# Patient Record
Sex: MALE | Race: WHITE | NOT HISPANIC OR LATINO | Employment: OTHER | URBAN - METROPOLITAN AREA
[De-identification: names, ages, dates, MRNs, and addresses within clinical notes are randomized per-mention and may not be internally consistent; named-entity substitution may affect disease eponyms.]

---

## 2017-01-01 ENCOUNTER — DOCUMENTATION (OUTPATIENT)
Dept: RADIATION ONCOLOGY | Facility: HOSPITAL | Age: 82
End: 2017-01-01

## 2017-01-01 ENCOUNTER — OFFICE VISIT (OUTPATIENT)
Dept: INTERNAL MEDICINE | Facility: CLINIC | Age: 82
End: 2017-01-01

## 2017-01-01 ENCOUNTER — ANESTHESIA (OUTPATIENT)
Dept: PERIOP | Facility: HOSPITAL | Age: 82
End: 2017-01-01

## 2017-01-01 ENCOUNTER — PATIENT OUTREACH (OUTPATIENT)
Dept: CASE MANAGEMENT | Facility: OTHER | Age: 82
End: 2017-01-01

## 2017-01-01 ENCOUNTER — HOSPITAL ENCOUNTER (EMERGENCY)
Facility: HOSPITAL | Age: 82
Discharge: HOME | End: 2017-08-13
Attending: EMERGENCY MEDICINE | Admitting: EMERGENCY MEDICINE

## 2017-01-01 ENCOUNTER — TELEPHONE (OUTPATIENT)
Dept: GASTROENTEROLOGY | Facility: CLINIC | Age: 82
End: 2017-01-01

## 2017-01-01 ENCOUNTER — HOSPITAL ENCOUNTER (EMERGENCY)
Facility: HOSPITAL | Age: 82
Discharge: HOME | End: 2017-08-24
Attending: EMERGENCY MEDICINE | Admitting: EMERGENCY MEDICINE

## 2017-01-01 ENCOUNTER — HOSPITAL ENCOUNTER (EMERGENCY)
Facility: HOSPITAL | Age: 82
Discharge: HOME | End: 2017-10-21
Attending: EMERGENCY MEDICINE | Admitting: EMERGENCY MEDICINE

## 2017-01-01 ENCOUNTER — APPOINTMENT (OUTPATIENT)
Dept: CT IMAGING | Facility: HOSPITAL | Age: 82
End: 2017-01-01

## 2017-01-01 ENCOUNTER — APPOINTMENT (OUTPATIENT)
Dept: CT IMAGING | Facility: HOSPITAL | Age: 82
End: 2017-01-01
Attending: UROLOGY

## 2017-01-01 ENCOUNTER — OFFICE VISIT (OUTPATIENT)
Dept: CARDIOLOGY | Facility: CLINIC | Age: 82
End: 2017-01-01

## 2017-01-01 ENCOUNTER — TELEPHONE (OUTPATIENT)
Dept: SOCIAL WORK | Facility: HOSPITAL | Age: 82
End: 2017-01-01

## 2017-01-01 ENCOUNTER — APPOINTMENT (OUTPATIENT)
Dept: RADIATION ONCOLOGY | Facility: HOSPITAL | Age: 82
End: 2017-01-01

## 2017-01-01 ENCOUNTER — HOSPITAL ENCOUNTER (INPATIENT)
Facility: HOSPITAL | Age: 82
LOS: 5 days | Discharge: HOME HEALTH | End: 2017-04-13
Attending: EMERGENCY MEDICINE | Admitting: HOSPITALIST

## 2017-01-01 ENCOUNTER — TELEPHONE (OUTPATIENT)
Dept: INTERNAL MEDICINE | Facility: CLINIC | Age: 82
End: 2017-01-01

## 2017-01-01 ENCOUNTER — ANESTHESIA EVENT (OUTPATIENT)
Dept: PERIOP | Facility: HOSPITAL | Age: 82
End: 2017-01-01

## 2017-01-01 ENCOUNTER — OFFICE VISIT (OUTPATIENT)
Dept: GASTROENTEROLOGY | Facility: CLINIC | Age: 82
End: 2017-01-01

## 2017-01-01 VITALS
DIASTOLIC BLOOD PRESSURE: 78 MMHG | SYSTOLIC BLOOD PRESSURE: 152 MMHG | BODY MASS INDEX: 23.34 KG/M2 | WEIGHT: 163 LBS | HEIGHT: 70 IN | HEART RATE: 67 BPM

## 2017-01-01 VITALS
WEIGHT: 165.4 LBS | OXYGEN SATURATION: 97 % | DIASTOLIC BLOOD PRESSURE: 52 MMHG | HEART RATE: 70 BPM | TEMPERATURE: 97.7 F | BODY MASS INDEX: 23.68 KG/M2 | HEIGHT: 70 IN | SYSTOLIC BLOOD PRESSURE: 130 MMHG

## 2017-01-01 VITALS
WEIGHT: 172 LBS | TEMPERATURE: 98 F | DIASTOLIC BLOOD PRESSURE: 56 MMHG | BODY MASS INDEX: 24.62 KG/M2 | HEIGHT: 70 IN | SYSTOLIC BLOOD PRESSURE: 127 MMHG | HEART RATE: 81 BPM | OXYGEN SATURATION: 96 % | RESPIRATION RATE: 16 BRPM

## 2017-01-01 VITALS
DIASTOLIC BLOOD PRESSURE: 62 MMHG | TEMPERATURE: 98.2 F | WEIGHT: 164.2 LBS | SYSTOLIC BLOOD PRESSURE: 146 MMHG | HEIGHT: 70 IN | BODY MASS INDEX: 23.51 KG/M2 | OXYGEN SATURATION: 97 % | HEART RATE: 72 BPM

## 2017-01-01 VITALS
DIASTOLIC BLOOD PRESSURE: 68 MMHG | WEIGHT: 162.4 LBS | HEIGHT: 70 IN | BODY MASS INDEX: 23.25 KG/M2 | SYSTOLIC BLOOD PRESSURE: 124 MMHG

## 2017-01-01 VITALS
HEART RATE: 91 BPM | BODY MASS INDEX: 24.05 KG/M2 | WEIGHT: 168 LBS | RESPIRATION RATE: 18 BRPM | OXYGEN SATURATION: 99 % | TEMPERATURE: 96.1 F | SYSTOLIC BLOOD PRESSURE: 182 MMHG | DIASTOLIC BLOOD PRESSURE: 88 MMHG | HEIGHT: 70 IN

## 2017-01-01 VITALS
HEART RATE: 105 BPM | HEIGHT: 70 IN | BODY MASS INDEX: 24.05 KG/M2 | OXYGEN SATURATION: 95 % | RESPIRATION RATE: 18 BRPM | WEIGHT: 168 LBS | DIASTOLIC BLOOD PRESSURE: 82 MMHG | TEMPERATURE: 99.3 F | SYSTOLIC BLOOD PRESSURE: 153 MMHG

## 2017-01-01 VITALS
WEIGHT: 162.5 LBS | TEMPERATURE: 98.3 F | HEIGHT: 70 IN | BODY MASS INDEX: 23.26 KG/M2 | DIASTOLIC BLOOD PRESSURE: 52 MMHG | HEART RATE: 73 BPM | OXYGEN SATURATION: 97 % | SYSTOLIC BLOOD PRESSURE: 128 MMHG

## 2017-01-01 VITALS
SYSTOLIC BLOOD PRESSURE: 182 MMHG | DIASTOLIC BLOOD PRESSURE: 75 MMHG | BODY MASS INDEX: 23.91 KG/M2 | HEART RATE: 90 BPM | OXYGEN SATURATION: 97 % | TEMPERATURE: 97.1 F | HEIGHT: 70 IN | WEIGHT: 167 LBS | RESPIRATION RATE: 16 BRPM

## 2017-01-01 VITALS
SYSTOLIC BLOOD PRESSURE: 118 MMHG | DIASTOLIC BLOOD PRESSURE: 56 MMHG | OXYGEN SATURATION: 99 % | TEMPERATURE: 97.6 F | BODY MASS INDEX: 24.5 KG/M2 | HEART RATE: 68 BPM | HEIGHT: 70 IN | WEIGHT: 171.1 LBS

## 2017-01-01 VITALS
HEIGHT: 70 IN | BODY MASS INDEX: 24.91 KG/M2 | WEIGHT: 174 LBS | SYSTOLIC BLOOD PRESSURE: 117 MMHG | DIASTOLIC BLOOD PRESSURE: 68 MMHG | HEART RATE: 79 BPM

## 2017-01-01 VITALS
WEIGHT: 176 LBS | SYSTOLIC BLOOD PRESSURE: 112 MMHG | OXYGEN SATURATION: 96 % | DIASTOLIC BLOOD PRESSURE: 58 MMHG | HEART RATE: 63 BPM | TEMPERATURE: 98.2 F | BODY MASS INDEX: 25.2 KG/M2 | HEIGHT: 70 IN

## 2017-01-01 VITALS
HEIGHT: 70 IN | HEART RATE: 90 BPM | SYSTOLIC BLOOD PRESSURE: 162 MMHG | WEIGHT: 164.4 LBS | TEMPERATURE: 98 F | DIASTOLIC BLOOD PRESSURE: 56 MMHG | BODY MASS INDEX: 23.54 KG/M2 | OXYGEN SATURATION: 97 %

## 2017-01-01 DIAGNOSIS — I10 ESSENTIAL HYPERTENSION: ICD-10-CM

## 2017-01-01 DIAGNOSIS — I50.42 CHRONIC COMBINED SYSTOLIC AND DIASTOLIC HEART FAILURE (HCC): ICD-10-CM

## 2017-01-01 DIAGNOSIS — I25.10 ATHEROSCLEROSIS OF NATIVE CORONARY ARTERY OF NATIVE HEART WITHOUT ANGINA PECTORIS: Primary | Chronic | ICD-10-CM

## 2017-01-01 DIAGNOSIS — E11.65 TYPE 2 DIABETES MELLITUS WITH HYPERGLYCEMIA, WITHOUT LONG-TERM CURRENT USE OF INSULIN (HCC): ICD-10-CM

## 2017-01-01 DIAGNOSIS — N50.819 TESTICLE PAIN: ICD-10-CM

## 2017-01-01 DIAGNOSIS — I25.10 CHRONIC CORONARY ARTERY DISEASE: ICD-10-CM

## 2017-01-01 DIAGNOSIS — N28.9 ABNORMAL RENAL FUNCTION: Primary | ICD-10-CM

## 2017-01-01 DIAGNOSIS — I48.0 PAROXYSMAL ATRIAL FIBRILLATION (HCC): ICD-10-CM

## 2017-01-01 DIAGNOSIS — C67.9 MALIGNANT NEOPLASM OF URINARY BLADDER, UNSPECIFIED SITE (HCC): ICD-10-CM

## 2017-01-01 DIAGNOSIS — E78.2 MIXED HYPERLIPIDEMIA: Chronic | ICD-10-CM

## 2017-01-01 DIAGNOSIS — R33.9 URINARY RETENTION: Primary | ICD-10-CM

## 2017-01-01 DIAGNOSIS — I42.9 CARDIOMYOPATHY (HCC): Chronic | ICD-10-CM

## 2017-01-01 DIAGNOSIS — I10 ESSENTIAL HYPERTENSION: Chronic | ICD-10-CM

## 2017-01-01 DIAGNOSIS — N18.30 CHRONIC KIDNEY DISEASE, STAGE 3 (HCC): ICD-10-CM

## 2017-01-01 DIAGNOSIS — I25.10 ATHEROSCLEROSIS OF NATIVE CORONARY ARTERY OF NATIVE HEART WITHOUT ANGINA PECTORIS: Primary | ICD-10-CM

## 2017-01-01 DIAGNOSIS — I10 ESSENTIAL HYPERTENSION: Primary | Chronic | ICD-10-CM

## 2017-01-01 DIAGNOSIS — D50.0 IRON DEFICIENCY ANEMIA DUE TO CHRONIC BLOOD LOSS: Primary | ICD-10-CM

## 2017-01-01 DIAGNOSIS — R31.0 GROSS HEMATURIA: Primary | ICD-10-CM

## 2017-01-01 DIAGNOSIS — I25.10 CHRONIC CORONARY ARTERY DISEASE: Primary | ICD-10-CM

## 2017-01-01 DIAGNOSIS — I48.20 CHRONIC ATRIAL FIBRILLATION (HCC): ICD-10-CM

## 2017-01-01 DIAGNOSIS — R33.8 ACUTE URINARY RETENTION: Primary | ICD-10-CM

## 2017-01-01 DIAGNOSIS — D50.0 IRON DEFICIENCY ANEMIA DUE TO CHRONIC BLOOD LOSS: ICD-10-CM

## 2017-01-01 DIAGNOSIS — J90 PLEURAL EFFUSION, BILATERAL: ICD-10-CM

## 2017-01-01 DIAGNOSIS — N18.9 CHRONIC RENAL INSUFFICIENCY, UNSPECIFIED STAGE: ICD-10-CM

## 2017-01-01 DIAGNOSIS — R73.9 HYPERGLYCEMIA: ICD-10-CM

## 2017-01-01 DIAGNOSIS — I50.22 CHRONIC SYSTOLIC CONGESTIVE HEART FAILURE (HCC): Primary | ICD-10-CM

## 2017-01-01 DIAGNOSIS — K63.5 COLON POLYPS: ICD-10-CM

## 2017-01-01 DIAGNOSIS — N28.9 ABNORMAL KIDNEY FUNCTION: Primary | ICD-10-CM

## 2017-01-01 DIAGNOSIS — T83.011A MALFUNCTION OF FOLEY CATHETER, INITIAL ENCOUNTER (HCC): Primary | ICD-10-CM

## 2017-01-01 DIAGNOSIS — I50.42 CHRONIC COMBINED SYSTOLIC AND DIASTOLIC HEART FAILURE (HCC): Chronic | ICD-10-CM

## 2017-01-01 DIAGNOSIS — Z00.00 MEDICARE ANNUAL WELLNESS VISIT, INITIAL: Primary | ICD-10-CM

## 2017-01-01 DIAGNOSIS — C67.9 MALIGNANT NEOPLASM OF URINARY BLADDER, UNSPECIFIED SITE (HCC): Primary | ICD-10-CM

## 2017-01-01 DIAGNOSIS — R33.9 URINARY RETENTION: ICD-10-CM

## 2017-01-01 DIAGNOSIS — I27.20 PULMONARY HTN (HCC): ICD-10-CM

## 2017-01-01 LAB
ABO + RH BLD: NORMAL
ABO + RH BLD: NORMAL
ABO GROUP BLD: NORMAL
ALBUMIN SERPL-MCNC: 3.8 G/DL (ref 3.5–5.2)
ALBUMIN SERPL-MCNC: 4 G/DL (ref 3.5–5.2)
ALBUMIN SERPL-MCNC: 4.2 G/DL (ref 3.5–5.2)
ALBUMIN/GLOB SERPL: 1.1 G/DL
ALBUMIN/GLOB SERPL: 1.3 G/DL
ALBUMIN/GLOB SERPL: 1.6 G/DL
ALP SERPL-CCNC: 57 U/L (ref 39–117)
ALP SERPL-CCNC: 77 U/L (ref 39–117)
ALP SERPL-CCNC: 82 U/L (ref 39–117)
ALT SERPL-CCNC: 10 U/L (ref 1–41)
ALT SERPL-CCNC: 11 U/L (ref 1–41)
ALT SERPL-CCNC: 16 U/L (ref 1–41)
ANION GAP SERPL CALCULATED.3IONS-SCNC: 10.4 MMOL/L
ANION GAP SERPL CALCULATED.3IONS-SCNC: 11.7 MMOL/L
ANION GAP SERPL CALCULATED.3IONS-SCNC: 14.3 MMOL/L
ANION GAP SERPL CALCULATED.3IONS-SCNC: 14.3 MMOL/L
ANION GAP SERPL CALCULATED.3IONS-SCNC: 15.2 MMOL/L
ANION GAP SERPL CALCULATED.3IONS-SCNC: 16.1 MMOL/L
ANTI-D: NORMAL
AST SERPL-CCNC: 17 U/L (ref 1–40)
AST SERPL-CCNC: 20 U/L (ref 1–40)
AST SERPL-CCNC: 20 U/L (ref 1–40)
BACTERIA SPEC AEROBE CULT: NO GROWTH
BACTERIA UR QL AUTO: ABNORMAL /HPF
BACTERIA UR QL AUTO: ABNORMAL /HPF
BASOPHILS # BLD AUTO: 0.01 10*3/MM3 (ref 0–0.2)
BASOPHILS # BLD AUTO: 0.01 10*3/MM3 (ref 0–0.2)
BASOPHILS # BLD AUTO: 0.02 10*3/MM3 (ref 0–0.2)
BASOPHILS NFR BLD AUTO: 0.2 % (ref 0–1.5)
BASOPHILS NFR BLD AUTO: 0.2 % (ref 0–1.5)
BASOPHILS NFR BLD AUTO: 0.3 % (ref 0–1.5)
BH BB BLOOD EXPIRATION DATE: NORMAL
BH BB BLOOD EXPIRATION DATE: NORMAL
BH BB BLOOD TYPE BARCODE: 600
BH BB BLOOD TYPE BARCODE: 600
BH BB DISPENSE STATUS: NORMAL
BH BB DISPENSE STATUS: NORMAL
BH BB PRODUCT CODE: NORMAL
BH BB PRODUCT CODE: NORMAL
BH BB UNIT NUMBER: NORMAL
BH BB UNIT NUMBER: NORMAL
BILIRUB SERPL-MCNC: 0.2 MG/DL (ref 0.1–1.2)
BILIRUB SERPL-MCNC: 0.2 MG/DL (ref 0.1–1.2)
BILIRUB SERPL-MCNC: 0.3 MG/DL (ref 0.1–1.2)
BILIRUB UR QL STRIP: NEGATIVE
BILIRUB UR QL STRIP: NEGATIVE
BLD GP AB SCN SERPL QL: NEGATIVE
BUN BLD-MCNC: 27 MG/DL (ref 8–23)
BUN BLD-MCNC: 28 MG/DL (ref 8–23)
BUN BLD-MCNC: 31 MG/DL (ref 8–23)
BUN BLD-MCNC: 41 MG/DL (ref 8–23)
BUN BLD-MCNC: 46 MG/DL (ref 8–23)
BUN BLD-MCNC: 47 MG/DL (ref 8–23)
BUN SERPL-MCNC: 23 MG/DL (ref 8–23)
BUN SERPL-MCNC: 42 MG/DL (ref 8–23)
BUN SERPL-MCNC: 53 MG/DL (ref 8–23)
BUN/CREAT SERPL: 18 (ref 7–25)
BUN/CREAT SERPL: 20 (ref 7–25)
BUN/CREAT SERPL: 20 (ref 7–25)
BUN/CREAT SERPL: 21.1 (ref 7–25)
BUN/CREAT SERPL: 23.3 (ref 7–25)
BUN/CREAT SERPL: 27.5 (ref 7–25)
BUN/CREAT SERPL: 31.3 (ref 7–25)
BUN/CREAT SERPL: 31.3 (ref 7–25)
BUN/CREAT SERPL: 32.5 (ref 7–25)
CALCIUM SERPL-MCNC: 9.2 MG/DL (ref 8.6–10.5)
CALCIUM SERPL-MCNC: 9.4 MG/DL (ref 8.6–10.5)
CALCIUM SERPL-MCNC: 9.7 MG/DL (ref 8.6–10.5)
CALCIUM SPEC-SCNC: 10.2 MG/DL (ref 8.6–10.5)
CALCIUM SPEC-SCNC: 8.6 MG/DL (ref 8.6–10.5)
CALCIUM SPEC-SCNC: 8.7 MG/DL (ref 8.6–10.5)
CALCIUM SPEC-SCNC: 8.7 MG/DL (ref 8.6–10.5)
CALCIUM SPEC-SCNC: 9 MG/DL (ref 8.6–10.5)
CALCIUM SPEC-SCNC: 9.7 MG/DL (ref 8.6–10.5)
CHLORIDE SERPL-SCNC: 101 MMOL/L (ref 98–107)
CHLORIDE SERPL-SCNC: 102 MMOL/L (ref 98–107)
CHLORIDE SERPL-SCNC: 104 MMOL/L (ref 98–107)
CHLORIDE SERPL-SCNC: 104 MMOL/L (ref 98–107)
CHLORIDE SERPL-SCNC: 105 MMOL/L (ref 98–107)
CHLORIDE SERPL-SCNC: 106 MMOL/L (ref 98–107)
CHLORIDE SERPL-SCNC: 106 MMOL/L (ref 98–107)
CHLORIDE SERPL-SCNC: 107 MMOL/L (ref 98–107)
CHLORIDE SERPL-SCNC: 108 MMOL/L (ref 98–107)
CHOLEST SERPL-MCNC: 151 MG/DL (ref 0–200)
CLARITY UR: ABNORMAL
CLARITY UR: CLEAR
CO2 SERPL-SCNC: 19.8 MMOL/L (ref 22–29)
CO2 SERPL-SCNC: 22.7 MMOL/L (ref 22–29)
CO2 SERPL-SCNC: 22.9 MMOL/L (ref 22–29)
CO2 SERPL-SCNC: 23.2 MMOL/L (ref 22–29)
CO2 SERPL-SCNC: 23.7 MMOL/L (ref 22–29)
CO2 SERPL-SCNC: 24.3 MMOL/L (ref 22–29)
CO2 SERPL-SCNC: 24.6 MMOL/L (ref 22–29)
CO2 SERPL-SCNC: 25.7 MMOL/L (ref 22–29)
CO2 SERPL-SCNC: 27.3 MMOL/L (ref 22–29)
COLOR UR: ABNORMAL
COLOR UR: YELLOW
CREAT BLD-MCNC: 1.33 MG/DL (ref 0.76–1.27)
CREAT BLD-MCNC: 1.33 MG/DL (ref 0.76–1.27)
CREAT BLD-MCNC: 1.35 MG/DL (ref 0.76–1.27)
CREAT BLD-MCNC: 1.47 MG/DL (ref 0.76–1.27)
CREAT BLD-MCNC: 1.49 MG/DL (ref 0.76–1.27)
CREAT BLD-MCNC: 1.5 MG/DL (ref 0.76–1.27)
CREAT SERPL-MCNC: 1.28 MG/DL (ref 0.76–1.27)
CREAT SERPL-MCNC: 1.63 MG/DL (ref 0.76–1.27)
CREAT SERPL-MCNC: 2.1 MG/DL (ref 0.76–1.27)
CROSSMATCH INTERPRETATION: NORMAL
CROSSMATCH INTERPRETATION: NORMAL
DEPRECATED RDW RBC AUTO: 66.5 FL (ref 37–54)
DEPRECATED RDW RBC AUTO: 67.2 FL (ref 37–54)
DEPRECATED RDW RBC AUTO: 67.5 FL (ref 37–54)
DEPRECATED RDW RBC AUTO: 69.1 FL (ref 37–54)
DEPRECATED RDW RBC AUTO: 71.2 FL (ref 37–54)
EOSINOPHIL # BLD AUTO: 0.06 10*3/MM3 (ref 0–0.7)
EOSINOPHIL # BLD AUTO: 0.09 10*3/MM3 (ref 0–0.7)
EOSINOPHIL # BLD AUTO: 0.14 10*3/MM3 (ref 0–0.7)
EOSINOPHIL NFR BLD AUTO: 0.9 % (ref 0.3–6.2)
EOSINOPHIL NFR BLD AUTO: 1.3 % (ref 0.3–6.2)
EOSINOPHIL NFR BLD AUTO: 2.3 % (ref 0.3–6.2)
ERYTHROCYTE [DISTWIDTH] IN BLOOD BY AUTOMATED COUNT: 16.6 % (ref 11.5–14.5)
ERYTHROCYTE [DISTWIDTH] IN BLOOD BY AUTOMATED COUNT: 21.2 % (ref 11.5–14.5)
ERYTHROCYTE [DISTWIDTH] IN BLOOD BY AUTOMATED COUNT: 21.4 % (ref 11.5–14.5)
ERYTHROCYTE [DISTWIDTH] IN BLOOD BY AUTOMATED COUNT: 21.6 % (ref 11.5–14.5)
ERYTHROCYTE [DISTWIDTH] IN BLOOD BY AUTOMATED COUNT: 22.6 % (ref 11.5–14.5)
ERYTHROCYTE [DISTWIDTH] IN BLOOD BY AUTOMATED COUNT: 23.5 % (ref 11.5–14.5)
ERYTHROCYTE [DISTWIDTH] IN BLOOD BY AUTOMATED COUNT: 23.7 % (ref 11.5–14.5)
GFR SERPL CREATININE-BSD FRML MDRD: 44 ML/MIN/1.73
GFR SERPL CREATININE-BSD FRML MDRD: 45 ML/MIN/1.73
GFR SERPL CREATININE-BSD FRML MDRD: 45 ML/MIN/1.73
GFR SERPL CREATININE-BSD FRML MDRD: 50 ML/MIN/1.73
GFR SERPL CREATININE-BSD FRML MDRD: 51 ML/MIN/1.73
GFR SERPL CREATININE-BSD FRML MDRD: 51 ML/MIN/1.73
GLOBULIN SER CALC-MCNC: 2.7 GM/DL
GLOBULIN SER CALC-MCNC: 3 GM/DL
GLOBULIN SER CALC-MCNC: 3.5 GM/DL
GLUCOSE BLD-MCNC: 109 MG/DL (ref 65–99)
GLUCOSE BLD-MCNC: 110 MG/DL (ref 65–99)
GLUCOSE BLD-MCNC: 113 MG/DL (ref 65–99)
GLUCOSE BLD-MCNC: 122 MG/DL (ref 65–99)
GLUCOSE BLD-MCNC: 142 MG/DL (ref 65–99)
GLUCOSE BLD-MCNC: 162 MG/DL (ref 65–99)
GLUCOSE BLDC GLUCOMTR-MCNC: 104 MG/DL (ref 70–130)
GLUCOSE BLDC GLUCOMTR-MCNC: 109 MG/DL (ref 70–130)
GLUCOSE BLDC GLUCOMTR-MCNC: 111 MG/DL (ref 70–130)
GLUCOSE BLDC GLUCOMTR-MCNC: 113 MG/DL (ref 70–130)
GLUCOSE BLDC GLUCOMTR-MCNC: 114 MG/DL (ref 70–130)
GLUCOSE BLDC GLUCOMTR-MCNC: 114 MG/DL (ref 70–130)
GLUCOSE BLDC GLUCOMTR-MCNC: 119 MG/DL (ref 70–130)
GLUCOSE BLDC GLUCOMTR-MCNC: 120 MG/DL (ref 70–130)
GLUCOSE BLDC GLUCOMTR-MCNC: 125 MG/DL (ref 70–130)
GLUCOSE BLDC GLUCOMTR-MCNC: 125 MG/DL (ref 70–130)
GLUCOSE BLDC GLUCOMTR-MCNC: 126 MG/DL (ref 70–130)
GLUCOSE BLDC GLUCOMTR-MCNC: 127 MG/DL (ref 70–130)
GLUCOSE BLDC GLUCOMTR-MCNC: 132 MG/DL (ref 70–130)
GLUCOSE BLDC GLUCOMTR-MCNC: 132 MG/DL (ref 70–130)
GLUCOSE BLDC GLUCOMTR-MCNC: 133 MG/DL (ref 70–130)
GLUCOSE BLDC GLUCOMTR-MCNC: 140 MG/DL (ref 70–130)
GLUCOSE BLDC GLUCOMTR-MCNC: 145 MG/DL (ref 70–130)
GLUCOSE BLDC GLUCOMTR-MCNC: 156 MG/DL (ref 70–130)
GLUCOSE BLDC GLUCOMTR-MCNC: 162 MG/DL (ref 70–130)
GLUCOSE BLDC GLUCOMTR-MCNC: 171 MG/DL (ref 70–130)
GLUCOSE SERPL-MCNC: 87 MG/DL (ref 65–99)
GLUCOSE SERPL-MCNC: 93 MG/DL (ref 65–99)
GLUCOSE SERPL-MCNC: 93 MG/DL (ref 65–99)
GLUCOSE UR STRIP-MCNC: NEGATIVE MG/DL
GLUCOSE UR STRIP-MCNC: NEGATIVE MG/DL
HBA1C MFR BLD: 5.3 % (ref 4.8–5.6)
HBA1C MFR BLD: 5.45 % (ref 4.8–5.6)
HBA1C MFR BLD: 5.64 % (ref 4.8–5.6)
HCT VFR BLD AUTO: 24.6 % (ref 40.4–52.2)
HCT VFR BLD AUTO: 29.4 % (ref 40.4–52.2)
HCT VFR BLD AUTO: 29.5 % (ref 40.4–52.2)
HCT VFR BLD AUTO: 32.7 % (ref 40.4–52.2)
HCT VFR BLD AUTO: 33 % (ref 40.4–52.2)
HCT VFR BLD AUTO: 33 % (ref 40.4–52.2)
HCT VFR BLD AUTO: 34.3 % (ref 40.4–52.2)
HDLC SERPL-MCNC: 37 MG/DL (ref 40–60)
HGB BLD-MCNC: 10.1 G/DL (ref 13.7–17.6)
HGB BLD-MCNC: 10.5 G/DL (ref 13.7–17.6)
HGB BLD-MCNC: 7.4 G/DL (ref 13.7–17.6)
HGB BLD-MCNC: 9.1 G/DL (ref 13.7–17.6)
HGB BLD-MCNC: 9.2 G/DL (ref 13.7–17.6)
HGB BLD-MCNC: 9.8 G/DL (ref 13.7–17.6)
HGB BLD-MCNC: 9.9 G/DL (ref 13.7–17.6)
HGB UR QL STRIP.AUTO: ABNORMAL
HGB UR QL STRIP.AUTO: ABNORMAL
HYALINE CASTS UR QL AUTO: ABNORMAL /LPF
HYALINE CASTS UR QL AUTO: ABNORMAL /LPF
IMM GRANULOCYTES # BLD: 0 10*3/MM3 (ref 0–0.03)
IMM GRANULOCYTES # BLD: 0 10*3/MM3 (ref 0–0.03)
IMM GRANULOCYTES # BLD: 0.02 10*3/MM3 (ref 0–0.03)
IMM GRANULOCYTES NFR BLD: 0 % (ref 0–0.5)
IMM GRANULOCYTES NFR BLD: 0 % (ref 0–0.5)
IMM GRANULOCYTES NFR BLD: 0.3 % (ref 0–0.5)
INR PPP: 1.19 (ref 0.9–1.1)
KETONES UR QL STRIP: ABNORMAL
KETONES UR QL STRIP: NEGATIVE
LDLC SERPL CALC-MCNC: 81 MG/DL (ref 0–100)
LEUKOCYTE ESTERASE UR QL STRIP.AUTO: ABNORMAL
LEUKOCYTE ESTERASE UR QL STRIP.AUTO: NEGATIVE
LYMPHOCYTES # BLD AUTO: 1.36 10*3/MM3 (ref 0.9–4.8)
LYMPHOCYTES # BLD AUTO: 1.37 10*3/MM3 (ref 0.9–4.8)
LYMPHOCYTES # BLD AUTO: 1.39 10*3/MM3 (ref 0.9–4.8)
LYMPHOCYTES NFR BLD AUTO: 18.9 % (ref 19.6–45.3)
LYMPHOCYTES NFR BLD AUTO: 21.1 % (ref 19.6–45.3)
LYMPHOCYTES NFR BLD AUTO: 22.1 % (ref 19.6–45.3)
MCH RBC QN AUTO: 23.9 PG (ref 27–32.7)
MCH RBC QN AUTO: 25.1 PG (ref 27–32.7)
MCH RBC QN AUTO: 25.2 PG (ref 27–32.7)
MCH RBC QN AUTO: 26.1 PG (ref 27–32.7)
MCH RBC QN AUTO: 26.4 PG (ref 27–32.7)
MCH RBC QN AUTO: 26.5 PG (ref 27–32.7)
MCH RBC QN AUTO: 28.8 PG (ref 27–32.7)
MCHC RBC AUTO-ENTMCNC: 29.7 G/DL (ref 32.6–36.4)
MCHC RBC AUTO-ENTMCNC: 30 G/DL (ref 32.6–36.4)
MCHC RBC AUTO-ENTMCNC: 30.1 G/DL (ref 32.6–36.4)
MCHC RBC AUTO-ENTMCNC: 30.6 G/DL (ref 32.6–36.4)
MCHC RBC AUTO-ENTMCNC: 30.9 G/DL (ref 32.6–36.4)
MCHC RBC AUTO-ENTMCNC: 31 G/DL (ref 32.6–36.4)
MCHC RBC AUTO-ENTMCNC: 31.2 G/DL (ref 32.6–36.4)
MCV RBC AUTO: 80.5 FL (ref 79.8–96.2)
MCV RBC AUTO: 81 FL (ref 79.8–96.2)
MCV RBC AUTO: 83.7 FL (ref 79.8–96.2)
MCV RBC AUTO: 85.8 FL (ref 79.8–96.2)
MCV RBC AUTO: 86.4 FL (ref 79.8–96.2)
MCV RBC AUTO: 87.1 FL (ref 79.8–96.2)
MCV RBC AUTO: 92.2 FL (ref 79.8–96.2)
MONOCYTES # BLD AUTO: 0.41 10*3/MM3 (ref 0.2–1.2)
MONOCYTES # BLD AUTO: 0.59 10*3/MM3 (ref 0.2–1.2)
MONOCYTES # BLD AUTO: 0.66 10*3/MM3 (ref 0.2–1.2)
MONOCYTES NFR BLD AUTO: 6.2 % (ref 5–12)
MONOCYTES NFR BLD AUTO: 9.2 % (ref 5–12)
MONOCYTES NFR BLD AUTO: 9.5 % (ref 5–12)
NEUTROPHILS # BLD AUTO: 4.08 10*3/MM3 (ref 1.9–8.1)
NEUTROPHILS # BLD AUTO: 4.73 10*3/MM3 (ref 1.9–8.1)
NEUTROPHILS # BLD AUTO: 5.05 10*3/MM3 (ref 1.9–8.1)
NEUTROPHILS NFR BLD AUTO: 65.9 % (ref 42.7–76)
NEUTROPHILS NFR BLD AUTO: 70 % (ref 42.7–76)
NEUTROPHILS NFR BLD AUTO: 71.6 % (ref 42.7–76)
NITRITE UR QL STRIP: NEGATIVE
NITRITE UR QL STRIP: NEGATIVE
PH UR STRIP.AUTO: 5.5 [PH] (ref 5–8)
PH UR STRIP.AUTO: 6 [PH] (ref 5–8)
PLATELET # BLD AUTO: 131 10*3/MM3 (ref 140–500)
PLATELET # BLD AUTO: 139 10*3/MM3 (ref 140–500)
PLATELET # BLD AUTO: 151 10*3/MM3 (ref 140–500)
PLATELET # BLD AUTO: 166 10*3/MM3 (ref 140–500)
PLATELET # BLD AUTO: 175 10*3/MM3 (ref 140–500)
PLATELET # BLD AUTO: 201 10*3/MM3 (ref 140–500)
PLATELET # BLD AUTO: 223 10*3/MM3 (ref 140–500)
PMV BLD AUTO: 10.3 FL (ref 6–12)
PMV BLD AUTO: 10.6 FL (ref 6–12)
PMV BLD AUTO: 11 FL (ref 6–12)
PMV BLD AUTO: 9.9 FL (ref 6–12)
PMV BLD AUTO: 9.9 FL (ref 6–12)
POTASSIUM BLD-SCNC: 3.9 MMOL/L (ref 3.5–5.2)
POTASSIUM BLD-SCNC: 4.2 MMOL/L (ref 3.5–5.2)
POTASSIUM BLD-SCNC: 4.2 MMOL/L (ref 3.5–5.2)
POTASSIUM BLD-SCNC: 4.4 MMOL/L (ref 3.5–5.2)
POTASSIUM BLD-SCNC: 4.4 MMOL/L (ref 3.5–5.2)
POTASSIUM BLD-SCNC: 4.5 MMOL/L (ref 3.5–5.2)
POTASSIUM SERPL-SCNC: 4.7 MMOL/L (ref 3.5–5.2)
POTASSIUM SERPL-SCNC: 5.1 MMOL/L (ref 3.5–5.2)
POTASSIUM SERPL-SCNC: 5.1 MMOL/L (ref 3.5–5.2)
PROT SERPL-MCNC: 6.8 G/DL (ref 6–8.5)
PROT SERPL-MCNC: 6.9 G/DL (ref 6–8.5)
PROT SERPL-MCNC: 7.5 G/DL (ref 6–8.5)
PROT UR QL STRIP: ABNORMAL
PROT UR QL STRIP: NEGATIVE
PROTHROMBIN TIME: 14.7 SECONDS (ref 11.7–14.2)
RBC # BLD AUTO: 2.94 10*6/MM3 (ref 4.6–6)
RBC # BLD AUTO: 3.2 10*6/MM3 (ref 4.6–6)
RBC # BLD AUTO: 3.63 10*6/MM3 (ref 4.6–6)
RBC # BLD AUTO: 3.79 10*6/MM3 (ref 4.6–6)
RBC # BLD AUTO: 3.81 10*6/MM3 (ref 4.6–6)
RBC # BLD AUTO: 3.97 10*6/MM3 (ref 4.6–6)
RBC # BLD AUTO: 4.1 10*6/MM3 (ref 4.6–6)
RBC # UR: ABNORMAL /HPF
RBC # UR: ABNORMAL /HPF
REF LAB TEST METHOD: ABNORMAL
REF LAB TEST METHOD: ABNORMAL
RH BLD: NEGATIVE
SODIUM BLD-SCNC: 141 MMOL/L (ref 136–145)
SODIUM BLD-SCNC: 142 MMOL/L (ref 136–145)
SODIUM BLD-SCNC: 143 MMOL/L (ref 136–145)
SODIUM SERPL-SCNC: 139 MMOL/L (ref 136–145)
SODIUM SERPL-SCNC: 143 MMOL/L (ref 136–145)
SODIUM SERPL-SCNC: 145 MMOL/L (ref 136–145)
SP GR UR STRIP: 1.01 (ref 1–1.03)
SP GR UR STRIP: <=1.005 (ref 1–1.03)
SQUAMOUS #/AREA URNS HPF: ABNORMAL /HPF
SQUAMOUS #/AREA URNS HPF: ABNORMAL /HPF
TRIGL SERPL-MCNC: 166 MG/DL (ref 0–150)
UNIT  ABO: NORMAL
UNIT  ABO: NORMAL
UNIT  RH: NORMAL
UNIT  RH: NORMAL
UROBILINOGEN UR QL STRIP: ABNORMAL
UROBILINOGEN UR QL STRIP: ABNORMAL
VLDLC SERPL CALC-MCNC: 33.2 MG/DL (ref 5–40)
WBC # BLD AUTO: 6.19 10*3/MM3 (ref 4.5–10.7)
WBC # BLD AUTO: 7.2 10*3/MM3 (ref 4.5–10.7)
WBC NRBC COR # BLD: 10.37 10*3/MM3 (ref 4.5–10.7)
WBC NRBC COR # BLD: 6.36 10*3/MM3 (ref 4.5–10.7)
WBC NRBC COR # BLD: 6.6 10*3/MM3 (ref 4.5–10.7)
WBC NRBC COR # BLD: 7.24 10*3/MM3 (ref 4.5–10.7)
WBC NRBC COR # BLD: 9.16 10*3/MM3 (ref 4.5–10.7)
WBC UR QL AUTO: ABNORMAL /HPF
WBC UR QL AUTO: ABNORMAL /HPF

## 2017-01-01 PROCEDURE — 25010000002 MORPHINE PER 10 MG: Performed by: HOSPITALIST

## 2017-01-01 PROCEDURE — 93000 ELECTROCARDIOGRAM COMPLETE: CPT | Performed by: INTERNAL MEDICINE

## 2017-01-01 PROCEDURE — 99221 1ST HOSP IP/OBS SF/LOW 40: CPT | Performed by: RADIOLOGY

## 2017-01-01 PROCEDURE — 80048 BASIC METABOLIC PNL TOTAL CA: CPT | Performed by: EMERGENCY MEDICINE

## 2017-01-01 PROCEDURE — 77412 RADIATION TX DELIVERY LVL 3: CPT | Performed by: RADIOLOGY

## 2017-01-01 PROCEDURE — 86870 RBC ANTIBODY IDENTIFICATION: CPT | Performed by: EMERGENCY MEDICINE

## 2017-01-01 PROCEDURE — 82962 GLUCOSE BLOOD TEST: CPT

## 2017-01-01 PROCEDURE — 77334 RADIATION TREATMENT AID(S): CPT | Performed by: RADIOLOGY

## 2017-01-01 PROCEDURE — 83036 HEMOGLOBIN GLYCOSYLATED A1C: CPT | Performed by: HOSPITALIST

## 2017-01-01 PROCEDURE — 87086 URINE CULTURE/COLONY COUNT: CPT | Performed by: EMERGENCY MEDICINE

## 2017-01-01 PROCEDURE — 77300 RADIATION THERAPY DOSE PLAN: CPT | Performed by: RADIOLOGY

## 2017-01-01 PROCEDURE — 51702 INSERT TEMP BLADDER CATH: CPT

## 2017-01-01 PROCEDURE — 99214 OFFICE O/P EST MOD 30 MIN: CPT | Performed by: INTERNAL MEDICINE

## 2017-01-01 PROCEDURE — 77280 THER RAD SIMULAJ FIELD SMPL: CPT | Performed by: RADIOLOGY

## 2017-01-01 PROCEDURE — 77295 3-D RADIOTHERAPY PLAN: CPT | Performed by: RADIOLOGY

## 2017-01-01 PROCEDURE — 99213 OFFICE O/P EST LOW 20 MIN: CPT | Performed by: FAMILY MEDICINE

## 2017-01-01 PROCEDURE — 0 IOPAMIDOL 61 % SOLUTION: Performed by: HOSPITALIST

## 2017-01-01 PROCEDURE — 77427 RADIATION TX MANAGEMENT X5: CPT | Performed by: RADIOLOGY

## 2017-01-01 PROCEDURE — 85027 COMPLETE CBC AUTOMATED: CPT | Performed by: HOSPITALIST

## 2017-01-01 PROCEDURE — 63710000001 PREDNISONE PER 5 MG: Performed by: UROLOGY

## 2017-01-01 PROCEDURE — 86920 COMPATIBILITY TEST SPIN: CPT

## 2017-01-01 PROCEDURE — 86850 RBC ANTIBODY SCREEN: CPT | Performed by: EMERGENCY MEDICINE

## 2017-01-01 PROCEDURE — 99212 OFFICE O/P EST SF 10 MIN: CPT | Performed by: NURSE PRACTITIONER

## 2017-01-01 PROCEDURE — 77387 GUIDANCE FOR RADJ TX DLVR: CPT | Performed by: RADIOLOGY

## 2017-01-01 PROCEDURE — 97110 THERAPEUTIC EXERCISES: CPT

## 2017-01-01 PROCEDURE — 77014 CHG CT GUIDANCE RADIATION THERAPY FLDS PLACEMENT: CPT | Performed by: RADIOLOGY

## 2017-01-01 PROCEDURE — 99283 EMERGENCY DEPT VISIT LOW MDM: CPT

## 2017-01-01 PROCEDURE — 99214 OFFICE O/P EST MOD 30 MIN: CPT | Performed by: FAMILY MEDICINE

## 2017-01-01 PROCEDURE — 25010000002 FUROSEMIDE PER 20 MG: Performed by: HOSPITALIST

## 2017-01-01 PROCEDURE — P9016 RBC LEUKOCYTES REDUCED: HCPCS

## 2017-01-01 PROCEDURE — 85610 PROTHROMBIN TIME: CPT | Performed by: EMERGENCY MEDICINE

## 2017-01-01 PROCEDURE — 0T9B80Z DRAINAGE OF BLADDER WITH DRAINAGE DEVICE, VIA NATURAL OR ARTIFICIAL OPENING ENDOSCOPIC: ICD-10-PCS | Performed by: UROLOGY

## 2017-01-01 PROCEDURE — 97161 PT EVAL LOW COMPLEX 20 MIN: CPT

## 2017-01-01 PROCEDURE — 36430 TRANSFUSION BLD/BLD COMPNT: CPT

## 2017-01-01 PROCEDURE — 63710000001 DIPHENHYDRAMINE PER 50 MG: Performed by: UROLOGY

## 2017-01-01 PROCEDURE — 86901 BLOOD TYPING SEROLOGIC RH(D): CPT | Performed by: EMERGENCY MEDICINE

## 2017-01-01 PROCEDURE — 86900 BLOOD TYPING SEROLOGIC ABO: CPT | Performed by: EMERGENCY MEDICINE

## 2017-01-01 PROCEDURE — 80048 BASIC METABOLIC PNL TOTAL CA: CPT | Performed by: HOSPITALIST

## 2017-01-01 PROCEDURE — 77263 THER RADIOLOGY TX PLNG CPLX: CPT | Performed by: RADIOLOGY

## 2017-01-01 PROCEDURE — 81001 URINALYSIS AUTO W/SCOPE: CPT | Performed by: EMERGENCY MEDICINE

## 2017-01-01 PROCEDURE — G0439 PPPS, SUBSEQ VISIT: HCPCS | Performed by: FAMILY MEDICINE

## 2017-01-01 PROCEDURE — 86900 BLOOD TYPING SEROLOGIC ABO: CPT

## 2017-01-01 PROCEDURE — 25010000002 FENTANYL CITRATE (PF) 100 MCG/2ML SOLUTION: Performed by: ANESTHESIOLOGY

## 2017-01-01 PROCEDURE — DT022ZZ BEAM RADIATION OF BLADDER USING PHOTONS >10 MEV: ICD-10-PCS | Performed by: RADIOLOGY

## 2017-01-01 PROCEDURE — 86922 COMPATIBILITY TEST ANTIGLOB: CPT

## 2017-01-01 PROCEDURE — G0008 ADMIN INFLUENZA VIRUS VAC: HCPCS | Performed by: FAMILY MEDICINE

## 2017-01-01 PROCEDURE — 25010000002 PROPOFOL 10 MG/ML EMULSION: Performed by: NURSE ANESTHETIST, CERTIFIED REGISTERED

## 2017-01-01 PROCEDURE — 77336 RADIATION PHYSICS CONSULT: CPT | Performed by: RADIOLOGY

## 2017-01-01 PROCEDURE — 25010000002 ONDANSETRON PER 1 MG: Performed by: NURSE ANESTHETIST, CERTIFIED REGISTERED

## 2017-01-01 PROCEDURE — 99284 EMERGENCY DEPT VISIT MOD MDM: CPT

## 2017-01-01 PROCEDURE — 85025 COMPLETE CBC W/AUTO DIFF WBC: CPT | Performed by: EMERGENCY MEDICINE

## 2017-01-01 PROCEDURE — 25010000002 LEVOFLOXACIN PER 250 MG: Performed by: UROLOGY

## 2017-01-01 PROCEDURE — 25010000002 FENTANYL CITRATE (PF) 100 MCG/2ML SOLUTION: Performed by: NURSE ANESTHETIST, CERTIFIED REGISTERED

## 2017-01-01 PROCEDURE — 74178 CT ABD&PLV WO CNTR FLWD CNTR: CPT

## 2017-01-01 RX ORDER — PANTOPRAZOLE SODIUM 40 MG/1
TABLET, DELAYED RELEASE ORAL
Qty: 60 TABLET | Refills: 2 | Status: SHIPPED | OUTPATIENT
Start: 2017-01-01 | End: 2017-01-01 | Stop reason: SDUPTHER

## 2017-01-01 RX ORDER — PROMETHAZINE HYDROCHLORIDE 25 MG/1
25 TABLET ORAL ONCE AS NEEDED
Status: DISCONTINUED | OUTPATIENT
Start: 2017-01-01 | End: 2017-01-01 | Stop reason: HOSPADM

## 2017-01-01 RX ORDER — DEXTROSE MONOHYDRATE 25 G/50ML
25 INJECTION, SOLUTION INTRAVENOUS
Status: DISCONTINUED | OUTPATIENT
Start: 2017-01-01 | End: 2017-01-01 | Stop reason: HOSPADM

## 2017-01-01 RX ORDER — HYDROMORPHONE HYDROCHLORIDE 1 MG/ML
0.5 INJECTION, SOLUTION INTRAMUSCULAR; INTRAVENOUS; SUBCUTANEOUS
Status: DISCONTINUED | OUTPATIENT
Start: 2017-01-01 | End: 2017-01-01 | Stop reason: HOSPADM

## 2017-01-01 RX ORDER — NICOTINE POLACRILEX 4 MG
15 LOZENGE BUCCAL
Status: DISCONTINUED | OUTPATIENT
Start: 2017-01-01 | End: 2017-01-01 | Stop reason: HOSPADM

## 2017-01-01 RX ORDER — FENTANYL CITRATE 50 UG/ML
INJECTION, SOLUTION INTRAMUSCULAR; INTRAVENOUS AS NEEDED
Status: DISCONTINUED | OUTPATIENT
Start: 2017-01-01 | End: 2017-01-01 | Stop reason: SURG

## 2017-01-01 RX ORDER — DIPHENHYDRAMINE HCL 50 MG
50 CAPSULE ORAL ONCE
Status: COMPLETED | OUTPATIENT
Start: 2017-01-01 | End: 2017-01-01

## 2017-01-01 RX ORDER — MIDAZOLAM HYDROCHLORIDE 1 MG/ML
1 INJECTION INTRAMUSCULAR; INTRAVENOUS
Status: DISCONTINUED | OUTPATIENT
Start: 2017-01-01 | End: 2017-01-01 | Stop reason: HOSPADM

## 2017-01-01 RX ORDER — SODIUM CHLORIDE 0.9 % (FLUSH) 0.9 %
10 SYRINGE (ML) INJECTION AS NEEDED
Status: DISCONTINUED | OUTPATIENT
Start: 2017-01-01 | End: 2017-01-01

## 2017-01-01 RX ORDER — PANTOPRAZOLE SODIUM 40 MG/1
40 TABLET, DELAYED RELEASE ORAL
Status: DISCONTINUED | OUTPATIENT
Start: 2017-01-01 | End: 2017-01-01 | Stop reason: HOSPADM

## 2017-01-01 RX ORDER — FUROSEMIDE 20 MG/1
TABLET ORAL
Qty: 90 TABLET | Refills: 0 | Status: SHIPPED | OUTPATIENT
Start: 2017-01-01 | End: 2017-01-01

## 2017-01-01 RX ORDER — ALLOPURINOL 100 MG/1
TABLET ORAL
Qty: 90 TABLET | Refills: 0 | Status: SHIPPED | OUTPATIENT
Start: 2017-01-01 | End: 2017-01-01

## 2017-01-01 RX ORDER — LABETALOL HYDROCHLORIDE 5 MG/ML
5 INJECTION, SOLUTION INTRAVENOUS
Status: DISCONTINUED | OUTPATIENT
Start: 2017-01-01 | End: 2017-01-01 | Stop reason: HOSPADM

## 2017-01-01 RX ORDER — PROMETHAZINE HYDROCHLORIDE 25 MG/ML
12.5 INJECTION, SOLUTION INTRAMUSCULAR; INTRAVENOUS ONCE AS NEEDED
Status: DISCONTINUED | OUTPATIENT
Start: 2017-01-01 | End: 2017-01-01 | Stop reason: HOSPADM

## 2017-01-01 RX ORDER — MAGNESIUM HYDROXIDE 1200 MG/15ML
LIQUID ORAL AS NEEDED
Status: DISCONTINUED | OUTPATIENT
Start: 2017-01-01 | End: 2017-01-01 | Stop reason: HOSPADM

## 2017-01-01 RX ORDER — PANTOPRAZOLE SODIUM 40 MG/1
40 TABLET, DELAYED RELEASE ORAL DAILY
Qty: 90 TABLET | Refills: 2 | Status: SHIPPED | OUTPATIENT
Start: 2017-01-01 | End: 2017-01-01

## 2017-01-01 RX ORDER — FUROSEMIDE 20 MG/1
TABLET ORAL
Qty: 90 TABLET | Refills: 0 | Status: SHIPPED | OUTPATIENT
Start: 2017-01-01 | End: 2017-01-01 | Stop reason: SDUPTHER

## 2017-01-01 RX ORDER — FUROSEMIDE 20 MG/1
10 TABLET ORAL DAILY
Status: DISCONTINUED | OUTPATIENT
Start: 2017-01-01 | End: 2017-01-01 | Stop reason: HOSPADM

## 2017-01-01 RX ORDER — SODIUM CHLORIDE 9 MG/ML
50 INJECTION, SOLUTION INTRAVENOUS CONTINUOUS
Status: DISCONTINUED | OUTPATIENT
Start: 2017-01-01 | End: 2017-01-01

## 2017-01-01 RX ORDER — SODIUM CHLORIDE, SODIUM LACTATE, POTASSIUM CHLORIDE, CALCIUM CHLORIDE 600; 310; 30; 20 MG/100ML; MG/100ML; MG/100ML; MG/100ML
9 INJECTION, SOLUTION INTRAVENOUS CONTINUOUS
Status: DISCONTINUED | OUTPATIENT
Start: 2017-01-01 | End: 2017-01-01 | Stop reason: HOSPADM

## 2017-01-01 RX ORDER — LIDOCAINE HYDROCHLORIDE 20 MG/ML
INJECTION, SOLUTION INFILTRATION; PERINEURAL AS NEEDED
Status: DISCONTINUED | OUTPATIENT
Start: 2017-01-01 | End: 2017-01-01 | Stop reason: SURG

## 2017-01-01 RX ORDER — FUROSEMIDE 20 MG/1
20 TABLET ORAL DAILY
COMMUNITY
Start: 2017-01-01 | End: 2017-01-01 | Stop reason: SDUPTHER

## 2017-01-01 RX ORDER — ONDANSETRON 2 MG/ML
4 INJECTION INTRAMUSCULAR; INTRAVENOUS ONCE AS NEEDED
Status: DISCONTINUED | OUTPATIENT
Start: 2017-01-01 | End: 2017-01-01 | Stop reason: HOSPADM

## 2017-01-01 RX ORDER — TRAMADOL HYDROCHLORIDE 50 MG/1
TABLET ORAL
Qty: 45 TABLET | Refills: 0 | Status: SHIPPED | OUTPATIENT
Start: 2017-01-01 | End: 2018-01-01 | Stop reason: HOSPADM

## 2017-01-01 RX ORDER — MORPHINE SULFATE 2 MG/ML
2 INJECTION, SOLUTION INTRAMUSCULAR; INTRAVENOUS
Status: DISCONTINUED | OUTPATIENT
Start: 2017-01-01 | End: 2017-01-01 | Stop reason: HOSPADM

## 2017-01-01 RX ORDER — ALLOPURINOL 100 MG/1
100 TABLET ORAL DAILY
Qty: 90 TABLET | Refills: 0 | Status: SHIPPED | OUTPATIENT
Start: 2017-01-01 | End: 2017-01-01 | Stop reason: SDUPTHER

## 2017-01-01 RX ORDER — SULFAMETHOXAZOLE AND TRIMETHOPRIM 800; 160 MG/1; MG/1
TABLET ORAL
COMMUNITY
Start: 2017-01-01 | End: 2017-01-01

## 2017-01-01 RX ORDER — FERROUS SULFATE 325(65) MG
325 TABLET ORAL 2 TIMES DAILY
Status: DISCONTINUED | OUTPATIENT
Start: 2017-01-01 | End: 2017-01-01 | Stop reason: HOSPADM

## 2017-01-01 RX ORDER — HYDRALAZINE HYDROCHLORIDE 20 MG/ML
5 INJECTION INTRAMUSCULAR; INTRAVENOUS
Status: DISCONTINUED | OUTPATIENT
Start: 2017-01-01 | End: 2017-01-01 | Stop reason: HOSPADM

## 2017-01-01 RX ORDER — OXYCODONE AND ACETAMINOPHEN 7.5; 325 MG/1; MG/1
1 TABLET ORAL ONCE AS NEEDED
Status: ACTIVE | OUTPATIENT
Start: 2017-01-01 | End: 2017-01-01

## 2017-01-01 RX ORDER — SODIUM CHLORIDE 0.9 % (FLUSH) 0.9 %
1-10 SYRINGE (ML) INJECTION AS NEEDED
Status: DISCONTINUED | OUTPATIENT
Start: 2017-01-01 | End: 2017-01-01 | Stop reason: HOSPADM

## 2017-01-01 RX ORDER — MIDAZOLAM HYDROCHLORIDE 1 MG/ML
2 INJECTION INTRAMUSCULAR; INTRAVENOUS
Status: DISCONTINUED | OUTPATIENT
Start: 2017-01-01 | End: 2017-01-01 | Stop reason: HOSPADM

## 2017-01-01 RX ORDER — FINASTERIDE 5 MG/1
5 TABLET, FILM COATED ORAL DAILY
Status: DISCONTINUED | OUTPATIENT
Start: 2017-01-01 | End: 2017-01-01 | Stop reason: HOSPADM

## 2017-01-01 RX ORDER — UREA 10 %
3 LOTION (ML) TOPICAL NIGHTLY PRN
Status: DISCONTINUED | OUTPATIENT
Start: 2017-01-01 | End: 2017-01-01 | Stop reason: HOSPADM

## 2017-01-01 RX ORDER — NALOXONE HCL 0.4 MG/ML
0.2 VIAL (ML) INJECTION AS NEEDED
Status: DISCONTINUED | OUTPATIENT
Start: 2017-01-01 | End: 2017-01-01 | Stop reason: HOSPADM

## 2017-01-01 RX ORDER — LISINOPRIL 10 MG/1
10 TABLET ORAL DAILY
Status: DISCONTINUED | OUTPATIENT
Start: 2017-01-01 | End: 2017-01-01 | Stop reason: HOSPADM

## 2017-01-01 RX ORDER — FENTANYL CITRATE 50 UG/ML
50 INJECTION, SOLUTION INTRAMUSCULAR; INTRAVENOUS
Status: DISCONTINUED | OUTPATIENT
Start: 2017-01-01 | End: 2017-01-01 | Stop reason: HOSPADM

## 2017-01-01 RX ORDER — HYDROCODONE BITARTRATE AND ACETAMINOPHEN 7.5; 325 MG/1; MG/1
1 TABLET ORAL ONCE AS NEEDED
Status: ACTIVE | OUTPATIENT
Start: 2017-01-01 | End: 2017-01-01

## 2017-01-01 RX ORDER — PROMETHAZINE HYDROCHLORIDE 25 MG/1
12.5 TABLET ORAL ONCE AS NEEDED
Status: ACTIVE | OUTPATIENT
Start: 2017-01-01 | End: 2017-01-01

## 2017-01-01 RX ORDER — MAGNESIUM HYDROXIDE 1200 MG/15ML
3000 LIQUID ORAL CONTINUOUS
Status: DISCONTINUED | OUTPATIENT
Start: 2017-01-01 | End: 2017-01-01 | Stop reason: HOSPADM

## 2017-01-01 RX ORDER — MORPHINE SULFATE 2 MG/ML
2 INJECTION, SOLUTION INTRAMUSCULAR; INTRAVENOUS EVERY 4 HOURS PRN
Status: DISCONTINUED | OUTPATIENT
Start: 2017-01-01 | End: 2017-01-01

## 2017-01-01 RX ORDER — ALLOPURINOL 100 MG/1
TABLET ORAL
Qty: 90 TABLET | Refills: 0 | OUTPATIENT
Start: 2017-01-01

## 2017-01-01 RX ORDER — FUROSEMIDE 20 MG/1
TABLET ORAL
Qty: 90 TABLET | Refills: 0 | Status: SHIPPED | OUTPATIENT
Start: 2017-01-01 | End: 2018-01-01 | Stop reason: HOSPADM

## 2017-01-01 RX ORDER — PROPOFOL 10 MG/ML
VIAL (ML) INTRAVENOUS AS NEEDED
Status: DISCONTINUED | OUTPATIENT
Start: 2017-01-01 | End: 2017-01-01 | Stop reason: SURG

## 2017-01-01 RX ORDER — ALLOPURINOL 100 MG/1
TABLET ORAL
Qty: 90 TABLET | Refills: 0 | Status: SHIPPED | OUTPATIENT
Start: 2017-01-01 | End: 2017-01-01 | Stop reason: SDUPTHER

## 2017-01-01 RX ORDER — DIPHENHYDRAMINE HYDROCHLORIDE 50 MG/ML
12.5 INJECTION INTRAMUSCULAR; INTRAVENOUS
Status: DISCONTINUED | OUTPATIENT
Start: 2017-01-01 | End: 2017-01-01 | Stop reason: HOSPADM

## 2017-01-01 RX ORDER — OXYCODONE AND ACETAMINOPHEN 7.5; 325 MG/1; MG/1
1 TABLET ORAL ONCE AS NEEDED
Status: DISCONTINUED | OUTPATIENT
Start: 2017-01-01 | End: 2017-01-01 | Stop reason: HOSPADM

## 2017-01-01 RX ORDER — NITROGLYCERIN 0.4 MG/1
0.4 TABLET SUBLINGUAL
Status: DISCONTINUED | OUTPATIENT
Start: 2017-01-01 | End: 2017-01-01 | Stop reason: HOSPADM

## 2017-01-01 RX ORDER — LISINOPRIL 20 MG/1
10 TABLET ORAL DAILY
Qty: 90 TABLET | Refills: 1 | Status: SHIPPED | OUTPATIENT
Start: 2017-01-01 | End: 2017-01-01

## 2017-01-01 RX ORDER — ATORVASTATIN CALCIUM 40 MG/1
40 TABLET, FILM COATED ORAL DAILY
Status: DISCONTINUED | OUTPATIENT
Start: 2017-01-01 | End: 2017-01-01 | Stop reason: HOSPADM

## 2017-01-01 RX ORDER — FUROSEMIDE 10 MG/ML
20 INJECTION INTRAMUSCULAR; INTRAVENOUS ONCE
Status: COMPLETED | OUTPATIENT
Start: 2017-01-01 | End: 2017-01-01

## 2017-01-01 RX ORDER — ONDANSETRON 2 MG/ML
INJECTION INTRAMUSCULAR; INTRAVENOUS AS NEEDED
Status: DISCONTINUED | OUTPATIENT
Start: 2017-01-01 | End: 2017-01-01 | Stop reason: SURG

## 2017-01-01 RX ORDER — HYDROCODONE BITARTRATE AND ACETAMINOPHEN 7.5; 325 MG/1; MG/1
1 TABLET ORAL ONCE AS NEEDED
Status: DISCONTINUED | OUTPATIENT
Start: 2017-01-01 | End: 2017-01-01 | Stop reason: HOSPADM

## 2017-01-01 RX ORDER — PREDNISONE 50 MG/1
50 TABLET ORAL EVERY 6 HOURS
Status: COMPLETED | OUTPATIENT
Start: 2017-01-01 | End: 2017-01-01

## 2017-01-01 RX ORDER — PROMETHAZINE HYDROCHLORIDE 25 MG/1
12.5 TABLET ORAL ONCE AS NEEDED
Status: DISCONTINUED | OUTPATIENT
Start: 2017-01-01 | End: 2017-01-01 | Stop reason: HOSPADM

## 2017-01-01 RX ORDER — FLUMAZENIL 0.1 MG/ML
0.2 INJECTION INTRAVENOUS AS NEEDED
Status: DISCONTINUED | OUTPATIENT
Start: 2017-01-01 | End: 2017-01-01 | Stop reason: HOSPADM

## 2017-01-01 RX ORDER — HYDRALAZINE HYDROCHLORIDE 25 MG/1
25 TABLET, FILM COATED ORAL 2 TIMES DAILY
Qty: 180 TABLET | Refills: 3 | Status: SHIPPED | OUTPATIENT
Start: 2017-01-01 | End: 2018-01-01 | Stop reason: HOSPADM

## 2017-01-01 RX ORDER — LEVOFLOXACIN 5 MG/ML
500 INJECTION, SOLUTION INTRAVENOUS ONCE
Status: COMPLETED | OUTPATIENT
Start: 2017-01-01 | End: 2017-01-01

## 2017-01-01 RX ORDER — PROMETHAZINE HYDROCHLORIDE 25 MG/1
25 SUPPOSITORY RECTAL ONCE AS NEEDED
Status: DISCONTINUED | OUTPATIENT
Start: 2017-01-01 | End: 2017-01-01 | Stop reason: HOSPADM

## 2017-01-01 RX ORDER — PANTOPRAZOLE SODIUM 40 MG/1
40 TABLET, DELAYED RELEASE ORAL
Qty: 60 TABLET | Refills: 1 | Status: SHIPPED | OUTPATIENT
Start: 2017-01-01 | End: 2017-01-01 | Stop reason: SDUPTHER

## 2017-01-01 RX ORDER — ACETAMINOPHEN 325 MG/1
650 TABLET ORAL EVERY 6 HOURS PRN
Status: DISCONTINUED | OUTPATIENT
Start: 2017-01-01 | End: 2017-01-01 | Stop reason: HOSPADM

## 2017-01-01 RX ORDER — ALLOPURINOL 100 MG/1
100 TABLET ORAL DAILY
Qty: 30 TABLET | Refills: 0 | Status: SHIPPED | OUTPATIENT
Start: 2017-01-01 | End: 2017-01-01 | Stop reason: SDUPTHER

## 2017-01-01 RX ORDER — ONDANSETRON 2 MG/ML
4 INJECTION INTRAMUSCULAR; INTRAVENOUS EVERY 6 HOURS PRN
Status: DISCONTINUED | OUTPATIENT
Start: 2017-01-01 | End: 2017-01-01 | Stop reason: HOSPADM

## 2017-01-01 RX ORDER — FAMOTIDINE 10 MG/ML
20 INJECTION, SOLUTION INTRAVENOUS ONCE
Status: COMPLETED | OUTPATIENT
Start: 2017-01-01 | End: 2017-01-01

## 2017-01-01 RX ORDER — FUROSEMIDE 10 MG/ML
10 SOLUTION ORAL DAILY
COMMUNITY
End: 2017-01-01 | Stop reason: DRUGHIGH

## 2017-01-01 RX ORDER — ALLOPURINOL 100 MG/1
100 TABLET ORAL DAILY
Status: DISCONTINUED | OUTPATIENT
Start: 2017-01-01 | End: 2017-01-01 | Stop reason: HOSPADM

## 2017-01-01 RX ORDER — TAMSULOSIN HYDROCHLORIDE 0.4 MG/1
0.4 CAPSULE ORAL NIGHTLY
Status: DISCONTINUED | OUTPATIENT
Start: 2017-01-01 | End: 2017-01-01 | Stop reason: HOSPADM

## 2017-01-01 RX ADMIN — AMINOCAPROIC ACID 2 G/HR: 250 INJECTION, SOLUTION INTRAVENOUS at 22:53

## 2017-01-01 RX ADMIN — SODIUM CHLORIDE 3000 ML: 900 IRRIGANT IRRIGATION at 15:55

## 2017-01-01 RX ADMIN — SODIUM CHLORIDE 3000 ML: 900 IRRIGANT IRRIGATION at 00:15

## 2017-01-01 RX ADMIN — PROPOFOL 100 MG: 10 INJECTION, EMULSION INTRAVENOUS at 16:06

## 2017-01-01 RX ADMIN — FERROUS SULFATE TAB 325 MG (65 MG ELEMENTAL FE) 325 MG: 325 (65 FE) TAB at 08:16

## 2017-01-01 RX ADMIN — FERROUS SULFATE TAB 325 MG (65 MG ELEMENTAL FE) 325 MG: 325 (65 FE) TAB at 18:42

## 2017-01-01 RX ADMIN — ATORVASTATIN CALCIUM 40 MG: 40 TABLET, FILM COATED ORAL at 08:06

## 2017-01-01 RX ADMIN — FERROUS SULFATE TAB 325 MG (65 MG ELEMENTAL FE) 325 MG: 325 (65 FE) TAB at 08:51

## 2017-01-01 RX ADMIN — PANTOPRAZOLE SODIUM 40 MG: 40 TABLET, DELAYED RELEASE ORAL at 17:13

## 2017-01-01 RX ADMIN — PANTOPRAZOLE SODIUM 40 MG: 40 TABLET, DELAYED RELEASE ORAL at 18:42

## 2017-01-01 RX ADMIN — LIDOCAINE HYDROCHLORIDE 60 MG: 20 INJECTION, SOLUTION INFILTRATION; PERINEURAL at 16:06

## 2017-01-01 RX ADMIN — AMINOCAPROIC ACID 2 G/HR: 250 INJECTION, SOLUTION INTRAVENOUS at 14:16

## 2017-01-01 RX ADMIN — ATORVASTATIN CALCIUM 40 MG: 40 TABLET, FILM COATED ORAL at 08:51

## 2017-01-01 RX ADMIN — AMINOCAPROIC ACID 2 G/HR: 250 INJECTION, SOLUTION INTRAVENOUS at 06:13

## 2017-01-01 RX ADMIN — TAMSULOSIN HYDROCHLORIDE 0.4 MG: 0.4 CAPSULE ORAL at 20:33

## 2017-01-01 RX ADMIN — SODIUM CHLORIDE 6000 ML: 900 IRRIGANT IRRIGATION at 21:15

## 2017-01-01 RX ADMIN — SODIUM CHLORIDE 3000 ML: 900 IRRIGANT IRRIGATION at 20:43

## 2017-01-01 RX ADMIN — SODIUM CHLORIDE, POTASSIUM CHLORIDE, SODIUM LACTATE AND CALCIUM CHLORIDE 9 ML/HR: 600; 310; 30; 20 INJECTION, SOLUTION INTRAVENOUS at 20:18

## 2017-01-01 RX ADMIN — SODIUM CHLORIDE 3000 ML: 900 IRRIGANT IRRIGATION at 05:15

## 2017-01-01 RX ADMIN — SODIUM CHLORIDE 3000 ML: 900 IRRIGANT IRRIGATION at 03:00

## 2017-01-01 RX ADMIN — FENTANYL CITRATE 25 MCG: 50 INJECTION INTRAMUSCULAR; INTRAVENOUS at 16:06

## 2017-01-01 RX ADMIN — SODIUM CHLORIDE 3000 ML: 900 IRRIGANT IRRIGATION at 04:40

## 2017-01-01 RX ADMIN — ALLOPURINOL 100 MG: 100 TABLET ORAL at 08:04

## 2017-01-01 RX ADMIN — FUROSEMIDE 10 MG: 20 TABLET ORAL at 08:06

## 2017-01-01 RX ADMIN — AMINOCAPROIC ACID 2 G/HR: 250 INJECTION, SOLUTION INTRAVENOUS at 10:18

## 2017-01-01 RX ADMIN — PREDNISONE 50 MG: 50 TABLET ORAL at 09:30

## 2017-01-01 RX ADMIN — FENTANYL CITRATE 50 MCG: 50 INJECTION INTRAMUSCULAR; INTRAVENOUS at 16:21

## 2017-01-01 RX ADMIN — TAMSULOSIN HYDROCHLORIDE 0.4 MG: 0.4 CAPSULE ORAL at 21:35

## 2017-01-01 RX ADMIN — FERROUS SULFATE TAB 325 MG (65 MG ELEMENTAL FE) 325 MG: 325 (65 FE) TAB at 08:06

## 2017-01-01 RX ADMIN — MORPHINE SULFATE 2 MG: 2 INJECTION, SOLUTION INTRAMUSCULAR; INTRAVENOUS at 05:11

## 2017-01-01 RX ADMIN — SODIUM CHLORIDE 3000 ML: 900 IRRIGANT IRRIGATION at 01:00

## 2017-01-01 RX ADMIN — ATORVASTATIN CALCIUM 40 MG: 40 TABLET, FILM COATED ORAL at 18:43

## 2017-01-01 RX ADMIN — FERROUS SULFATE TAB 325 MG (65 MG ELEMENTAL FE) 325 MG: 325 (65 FE) TAB at 08:04

## 2017-01-01 RX ADMIN — SODIUM CHLORIDE 3000 ML: 900 IRRIGANT IRRIGATION at 17:04

## 2017-01-01 RX ADMIN — SODIUM CHLORIDE 3000 ML: 900 IRRIGANT IRRIGATION at 14:50

## 2017-01-01 RX ADMIN — MORPHINE SULFATE 2 MG: 2 INJECTION, SOLUTION INTRAMUSCULAR; INTRAVENOUS at 02:27

## 2017-01-01 RX ADMIN — FINASTERIDE 5 MG: 5 TABLET, FILM COATED ORAL at 08:06

## 2017-01-01 RX ADMIN — AMINOCAPROIC ACID 2 G/HR: 250 INJECTION, SOLUTION INTRAVENOUS at 06:26

## 2017-01-01 RX ADMIN — ATORVASTATIN CALCIUM 40 MG: 40 TABLET, FILM COATED ORAL at 08:04

## 2017-01-01 RX ADMIN — FINASTERIDE 5 MG: 5 TABLET, FILM COATED ORAL at 08:04

## 2017-01-01 RX ADMIN — TAMSULOSIN HYDROCHLORIDE 0.4 MG: 0.4 CAPSULE ORAL at 20:43

## 2017-01-01 RX ADMIN — Medication 3 MG: at 21:48

## 2017-01-01 RX ADMIN — EPHEDRINE SULFATE 10 MG: 50 INJECTION INTRAMUSCULAR; INTRAVENOUS; SUBCUTANEOUS at 16:30

## 2017-01-01 RX ADMIN — SODIUM CHLORIDE 3000 ML: 900 IRRIGANT IRRIGATION at 00:17

## 2017-01-01 RX ADMIN — FUROSEMIDE 10 MG: 20 TABLET ORAL at 08:04

## 2017-01-01 RX ADMIN — FERROUS SULFATE TAB 325 MG (65 MG ELEMENTAL FE) 325 MG: 325 (65 FE) TAB at 17:13

## 2017-01-01 RX ADMIN — PANTOPRAZOLE SODIUM 40 MG: 40 TABLET, DELAYED RELEASE ORAL at 18:26

## 2017-01-01 RX ADMIN — SODIUM CHLORIDE 3000 ML: 900 IRRIGANT IRRIGATION at 02:00

## 2017-01-01 RX ADMIN — SODIUM CHLORIDE 3000 ML: 900 IRRIGANT IRRIGATION at 23:54

## 2017-01-01 RX ADMIN — LISINOPRIL 10 MG: 10 TABLET ORAL at 14:04

## 2017-01-01 RX ADMIN — ALLOPURINOL 100 MG: 100 TABLET ORAL at 08:16

## 2017-01-01 RX ADMIN — SODIUM CHLORIDE 3000 ML: 900 IRRIGANT IRRIGATION at 14:02

## 2017-01-01 RX ADMIN — FINASTERIDE 5 MG: 5 TABLET, FILM COATED ORAL at 08:51

## 2017-01-01 RX ADMIN — FAMOTIDINE 20 MG: 10 INJECTION, SOLUTION INTRAVENOUS at 14:57

## 2017-01-01 RX ADMIN — IOPAMIDOL 95 ML: 612 INJECTION, SOLUTION INTRAVENOUS at 10:40

## 2017-01-01 RX ADMIN — AMINOCAPROIC ACID 2 G/HR: 250 INJECTION, SOLUTION INTRAVENOUS at 01:54

## 2017-01-01 RX ADMIN — ALLOPURINOL 100 MG: 100 TABLET ORAL at 08:06

## 2017-01-01 RX ADMIN — SODIUM CHLORIDE 3000 ML: 900 IRRIGANT IRRIGATION at 18:30

## 2017-01-01 RX ADMIN — FERROUS SULFATE TAB 325 MG (65 MG ELEMENTAL FE) 325 MG: 325 (65 FE) TAB at 18:26

## 2017-01-01 RX ADMIN — MORPHINE SULFATE 2 MG: 2 INJECTION, SOLUTION INTRAMUSCULAR; INTRAVENOUS at 22:52

## 2017-01-01 RX ADMIN — FENTANYL CITRATE 50 MCG: 50 INJECTION INTRAMUSCULAR; INTRAVENOUS at 17:14

## 2017-01-01 RX ADMIN — SODIUM CHLORIDE, POTASSIUM CHLORIDE, SODIUM LACTATE AND CALCIUM CHLORIDE 9 ML/HR: 600; 310; 30; 20 INJECTION, SOLUTION INTRAVENOUS at 14:55

## 2017-01-01 RX ADMIN — AMINOCAPROIC ACID 2 G/HR: 250 INJECTION, SOLUTION INTRAVENOUS at 18:37

## 2017-01-01 RX ADMIN — LISINOPRIL 10 MG: 10 TABLET ORAL at 08:16

## 2017-01-01 RX ADMIN — TAMSULOSIN HYDROCHLORIDE 0.4 MG: 0.4 CAPSULE ORAL at 20:18

## 2017-01-01 RX ADMIN — SODIUM CHLORIDE 3000 ML: 900 IRRIGANT IRRIGATION at 06:14

## 2017-01-01 RX ADMIN — FINASTERIDE 5 MG: 5 TABLET, FILM COATED ORAL at 08:16

## 2017-01-01 RX ADMIN — SODIUM CHLORIDE 50 ML/HR: 9 INJECTION, SOLUTION INTRAVENOUS at 01:25

## 2017-01-01 RX ADMIN — PREDNISONE 50 MG: 50 TABLET ORAL at 21:35

## 2017-01-01 RX ADMIN — PANTOPRAZOLE SODIUM 40 MG: 40 TABLET, DELAYED RELEASE ORAL at 17:49

## 2017-01-01 RX ADMIN — SODIUM CHLORIDE 3000 ML: 900 IRRIGANT IRRIGATION at 12:47

## 2017-01-01 RX ADMIN — MORPHINE SULFATE 2 MG: 2 INJECTION, SOLUTION INTRAMUSCULAR; INTRAVENOUS at 20:18

## 2017-01-01 RX ADMIN — TAMSULOSIN HYDROCHLORIDE 0.4 MG: 0.4 CAPSULE ORAL at 21:11

## 2017-01-01 RX ADMIN — SODIUM CHLORIDE 3000 ML: 900 IRRIGANT IRRIGATION at 07:17

## 2017-01-01 RX ADMIN — PANTOPRAZOLE SODIUM 40 MG: 40 TABLET, DELAYED RELEASE ORAL at 05:29

## 2017-01-01 RX ADMIN — SODIUM CHLORIDE 6000 ML: 900 IRRIGANT IRRIGATION at 22:52

## 2017-01-01 RX ADMIN — PANTOPRAZOLE SODIUM 40 MG: 40 TABLET, DELAYED RELEASE ORAL at 08:16

## 2017-01-01 RX ADMIN — SODIUM CHLORIDE 3000 ML: 900 IRRIGANT IRRIGATION at 06:00

## 2017-01-01 RX ADMIN — ALLOPURINOL 100 MG: 100 TABLET ORAL at 08:51

## 2017-01-01 RX ADMIN — FERROUS SULFATE TAB 325 MG (65 MG ELEMENTAL FE) 325 MG: 325 (65 FE) TAB at 17:49

## 2017-01-01 RX ADMIN — PANTOPRAZOLE SODIUM 40 MG: 40 TABLET, DELAYED RELEASE ORAL at 08:51

## 2017-01-01 RX ADMIN — DIPHENHYDRAMINE HYDROCHLORIDE 50 MG: 25 CAPSULE ORAL at 09:31

## 2017-01-01 RX ADMIN — PANTOPRAZOLE SODIUM 40 MG: 40 TABLET, DELAYED RELEASE ORAL at 06:33

## 2017-01-01 RX ADMIN — SODIUM CHLORIDE 6000 ML: 900 IRRIGANT IRRIGATION at 05:06

## 2017-01-01 RX ADMIN — LEVOFLOXACIN 500 MG: 5 INJECTION, SOLUTION INTRAVENOUS at 15:54

## 2017-01-01 RX ADMIN — FUROSEMIDE 10 MG: 20 TABLET ORAL at 14:11

## 2017-01-01 RX ADMIN — ATORVASTATIN CALCIUM 40 MG: 40 TABLET, FILM COATED ORAL at 08:16

## 2017-01-01 RX ADMIN — PREDNISONE 50 MG: 50 TABLET ORAL at 01:55

## 2017-01-01 RX ADMIN — FUROSEMIDE 20 MG: 10 INJECTION, SOLUTION INTRAMUSCULAR; INTRAVENOUS at 14:18

## 2017-01-01 RX ADMIN — AMINOCAPROIC ACID 2 G/HR: 250 INJECTION, SOLUTION INTRAVENOUS at 02:00

## 2017-01-01 RX ADMIN — SODIUM CHLORIDE 3000 ML: 900 IRRIGANT IRRIGATION at 06:13

## 2017-01-01 RX ADMIN — SODIUM CHLORIDE 3000 ML: 900 IRRIGANT IRRIGATION at 10:57

## 2017-01-01 RX ADMIN — LISINOPRIL 10 MG: 10 TABLET ORAL at 08:06

## 2017-01-01 RX ADMIN — ONDANSETRON 4 MG: 2 INJECTION INTRAMUSCULAR; INTRAVENOUS at 16:20

## 2017-01-01 RX ADMIN — SODIUM CHLORIDE 50 ML/HR: 9 INJECTION, SOLUTION INTRAVENOUS at 22:18

## 2017-01-01 RX ADMIN — SODIUM CHLORIDE 3000 ML: 900 IRRIGANT IRRIGATION at 23:55

## 2017-01-01 RX ADMIN — LISINOPRIL 10 MG: 10 TABLET ORAL at 08:04

## 2017-01-03 ENCOUNTER — APPOINTMENT (OUTPATIENT)
Dept: LAB | Facility: HOSPITAL | Age: 82
End: 2017-01-03

## 2017-01-03 ENCOUNTER — OFFICE VISIT (OUTPATIENT)
Dept: INTERNAL MEDICINE | Facility: CLINIC | Age: 82
End: 2017-01-03

## 2017-01-03 VITALS
SYSTOLIC BLOOD PRESSURE: 146 MMHG | TEMPERATURE: 97.5 F | HEART RATE: 67 BPM | BODY MASS INDEX: 24.22 KG/M2 | WEIGHT: 169.2 LBS | OXYGEN SATURATION: 99 % | DIASTOLIC BLOOD PRESSURE: 62 MMHG | HEIGHT: 70 IN

## 2017-01-03 DIAGNOSIS — E11.9 TYPE 2 DIABETES MELLITUS WITHOUT COMPLICATION, WITHOUT LONG-TERM CURRENT USE OF INSULIN (HCC): ICD-10-CM

## 2017-01-03 DIAGNOSIS — I10 ESSENTIAL HYPERTENSION: Primary | ICD-10-CM

## 2017-01-03 DIAGNOSIS — E78.2 MIXED HYPERLIPIDEMIA: ICD-10-CM

## 2017-01-03 LAB
ALBUMIN SERPL-MCNC: 4 G/DL (ref 3.5–5.2)
ALBUMIN/GLOB SERPL: 1.3 G/DL
ALP SERPL-CCNC: 76 U/L (ref 39–117)
ALT SERPL W P-5'-P-CCNC: 12 U/L (ref 1–41)
ANION GAP SERPL CALCULATED.3IONS-SCNC: 16.4 MMOL/L
AST SERPL-CCNC: 15 U/L (ref 1–40)
BILIRUB SERPL-MCNC: 0.2 MG/DL (ref 0.1–1.2)
BUN BLD-MCNC: 39 MG/DL (ref 8–23)
BUN/CREAT SERPL: 27.5 (ref 7–25)
CALCIUM SPEC-SCNC: 9.2 MG/DL (ref 8.6–10.5)
CHLORIDE SERPL-SCNC: 104 MMOL/L (ref 98–107)
CO2 SERPL-SCNC: 22.6 MMOL/L (ref 22–29)
CREAT BLD-MCNC: 1.42 MG/DL (ref 0.76–1.27)
GFR SERPL CREATININE-BSD FRML MDRD: 47 ML/MIN/1.73
GLOBULIN UR ELPH-MCNC: 3.1 GM/DL
GLUCOSE BLD-MCNC: 140 MG/DL (ref 65–99)
HBA1C MFR BLD: 6.1 % (ref 4.8–5.6)
POTASSIUM BLD-SCNC: 4.1 MMOL/L (ref 3.5–5.2)
PROT SERPL-MCNC: 7.1 G/DL (ref 6–8.5)
SODIUM BLD-SCNC: 143 MMOL/L (ref 136–145)

## 2017-01-03 PROCEDURE — 83036 HEMOGLOBIN GLYCOSYLATED A1C: CPT | Performed by: FAMILY MEDICINE

## 2017-01-03 PROCEDURE — 36415 COLL VENOUS BLD VENIPUNCTURE: CPT | Performed by: FAMILY MEDICINE

## 2017-01-03 PROCEDURE — 80053 COMPREHEN METABOLIC PANEL: CPT | Performed by: FAMILY MEDICINE

## 2017-01-03 PROCEDURE — 99214 OFFICE O/P EST MOD 30 MIN: CPT | Performed by: FAMILY MEDICINE

## 2017-01-03 NOTE — MR AVS SNAPSHOT
Imtiaz Jones   1/3/2017 12:30 PM   Office Visit    Dept Phone:  [phone]   Encounter #:  [id number]    Provider:  Charles Rose MD   Department:  [facility]                Your Full Care Plan              Your Updated Medication List          This list is accurate as of: 1/3/17 12:56 PM.  Always use your most recent med list.                allopurinol 100 MG tablet   Commonly known as:  ZYLOPRIM   TAKE 1 TABLET DAILY       finasteride 5 MG tablet   Commonly known as:  PROSCAR       furosemide 20 MG tablet   Commonly known as:  LASIX   TAKE 1 TABLET DAILY AS DIRECTED       simvastatin 80 MG tablet   Commonly known as:  ZOCOR   TAKE 1 TABLET DAILY IN THE EVENING       tamsulosin 0.4 MG capsule 24 hr capsule   Commonly known as:  FLOMAX               We Performed the Following     Comprehensive Metabolic Panel     Hemoglobin A1c       You Were Diagnosed With        Codes Comments    Essential hypertension    -  Primary ICD-10-CM: I10  ICD-9-CM: 401.9     Type 2 diabetes mellitus without complication, without long-term current use of insulin     ICD-10-CM: E11.9  ICD-9-CM: 250.00     Mixed hyperlipidemia     ICD-10-CM: E78.2  ICD-9-CM: 272.2       Instructions     None    Patient Instructions History      Upcoming Appointments     Visit Type Date Time Department    OFFICE VISIT 1/3/2017 12:30 PM [facility]      [facility] Signup     Our records indicate that you have an active [facility] account.    You can view your After Visit Summary by going to [employer] and logging in with your [facility] username and password.  If you don't have a [facility] username and password but a parent or guardian has access to your record, the parent or guardian should login with their own [facility] username and password and access your record to view the After Visit Summary.    If you have questions, you can email  [email or web address] or call [phone] to talk to our MyChart staff.  Remember, MyChart is NOT to be used for urgent needs.  For medical emergencies, dial 911.               Other Info from Your Visit           Allergies     Iodine  Hives    Keflex [Cephalexin]  Hives      Reason for Visit     follow up to diabetes     follow up to hypertension           Vital Signs     Blood Pressure Pulse Temperature Height Weight Oxygen Saturation    146/62 (BP Location: Right arm, Patient Position: Sitting, Cuff Size: Large Adult) 67 97.5 °F (36.4 °C) (Oral) 70\" (177.8 cm) 169 lb 3.2 oz (76.7 kg) 99%    Body Mass Index Smoking Status                24.28 kg/m2 Former Smoker          Problems and Diagnoses Noted     Diabetes    High cholesterol or triglycerides    High blood pressure      No Longer an Issue     Acute renal impairment        "

## 2017-01-03 NOTE — PROGRESS NOTES
Subjective   Imtiaz oJnes is a 88 y.o. male.     Chief Complaint   Patient presents with   • follow up to diabetes   • follow up to hypertension         History of Present Illness   Patient comes in for follow-up of diabetes his blood sugars have been starting to creep up he's getting readings in the 150 range 4 hours after eating supper he checks his fingersticks once a day he has not had any treatment at this point slightly elevated creatinine in the past his no vision problems or fungal infections in the skin.  His blood pressure usually in the 140-150 range no chest pain or symptoms attributable to coronary artery disease which is having the past  The following portions of the patient's history were reviewed and updated as appropriate: allergies, current medications, past family history, past medical history, past social history, past surgical history and problem list.    Review of Systems   Constitutional: Negative for activity change, appetite change and unexpected weight change.   HENT: Negative for nosebleeds and trouble swallowing.    Eyes: Negative for pain and visual disturbance.   Respiratory: Negative for chest tightness, shortness of breath and wheezing.    Cardiovascular: Negative for chest pain and palpitations.   Gastrointestinal: Negative for abdominal pain and blood in stool.   Endocrine: Negative.    Genitourinary: Negative for difficulty urinating and hematuria.   Musculoskeletal: Negative for joint swelling.   Skin: Negative for color change and rash.   Allergic/Immunologic: Negative.    Neurological: Negative for syncope and speech difficulty.   Hematological: Negative for adenopathy.   Psychiatric/Behavioral: Negative for agitation and confusion.   All other systems reviewed and are negative.      Objective   Physical Exam   Constitutional: He is oriented to person, place, and time. He appears well-developed and well-nourished. No distress.   HENT:   Head: Normocephalic and atraumatic.    Eyes: Conjunctivae and EOM are normal. Pupils are equal, round, and reactive to light. Right eye exhibits no discharge. Left eye exhibits no discharge. No scleral icterus.   Neck: Normal range of motion. Neck supple. No tracheal deviation present. No thyromegaly present.   Cardiovascular: Normal rate, regular rhythm and normal pulses.  Exam reveals no gallop.    No murmur heard.  Pulmonary/Chest: Effort normal and breath sounds normal. No respiratory distress. He has no wheezes. He has no rales.   Musculoskeletal: Normal range of motion. He exhibits edema.   1+ to knee B   Neurological: He is alert and oriented to person, place, and time. He exhibits normal muscle tone. Coordination normal.   Skin: Skin is warm and dry. Rash noted. No erythema. No pallor.   Dry skin legs B   Psychiatric: He has a normal mood and affect. His behavior is normal. Judgment and thought content normal.   Nursing note and vitals reviewed.      Assessment/Plan   Imtiaz was seen today for follow up to diabetes and follow up to hypertension.    Diagnoses and all orders for this visit:    Essential hypertension  -     Comprehensive Metabolic Panel    Type 2 diabetes mellitus without complication, without long-term current use of insulin  -     Hemoglobin A1c  -     Comprehensive Metabolic Panel    Mixed hyperlipidemia  -     Comprehensive Metabolic Panel

## 2017-01-05 ENCOUNTER — TELEPHONE (OUTPATIENT)
Dept: INTERNAL MEDICINE | Facility: CLINIC | Age: 82
End: 2017-01-05

## 2017-01-10 DIAGNOSIS — I10 ESSENTIAL HYPERTENSION: ICD-10-CM

## 2017-01-10 RX ORDER — FUROSEMIDE 20 MG/1
20 TABLET ORAL DAILY
Qty: 90 TABLET | Refills: 1 | Status: ON HOLD | OUTPATIENT
Start: 2017-01-10 | End: 2017-02-17

## 2017-01-20 ENCOUNTER — TELEPHONE (OUTPATIENT)
Dept: INTERNAL MEDICINE | Facility: CLINIC | Age: 82
End: 2017-01-20

## 2017-01-20 RX ORDER — LISINOPRIL 20 MG/1
20 TABLET ORAL DAILY
Qty: 90 TABLET | Refills: 0 | Status: ON HOLD | OUTPATIENT
Start: 2017-01-20 | End: 2017-02-17

## 2017-01-20 NOTE — TELEPHONE ENCOUNTER
[facility] sent a refill request for lisinopril 20 mg - this is not on patient's medications list.  Spoke to patient and he stated that he is taking this.  Please advise.

## 2017-02-12 ENCOUNTER — HOSPITAL ENCOUNTER (INPATIENT)
Facility: HOSPITAL | Age: 82
LOS: 5 days | Discharge: HOME | End: 2017-02-17
Attending: EMERGENCY MEDICINE | Admitting: INTERNAL MEDICINE

## 2017-02-12 DIAGNOSIS — R55 NEAR SYNCOPE: Primary | ICD-10-CM

## 2017-02-12 DIAGNOSIS — G47.34 HYPOXIA, SLEEP RELATED: ICD-10-CM

## 2017-02-12 DIAGNOSIS — D64.9 ANEMIA, UNSPECIFIED TYPE: ICD-10-CM

## 2017-02-12 DIAGNOSIS — D50.0 IRON DEFICIENCY ANEMIA DUE TO CHRONIC BLOOD LOSS: ICD-10-CM

## 2017-02-12 DIAGNOSIS — I10 ESSENTIAL HYPERTENSION: ICD-10-CM

## 2017-02-12 DIAGNOSIS — T14.8XXA SKIN AVULSION: ICD-10-CM

## 2017-02-12 LAB
ALBUMIN SERPL-MCNC: 3.9 G/DL (ref 3.5–5.2)
ALBUMIN/GLOB SERPL: 1.2 G/DL
ALP SERPL-CCNC: 79 U/L (ref 39–117)
ALT SERPL W P-5'-P-CCNC: 13 U/L (ref 1–41)
ANION GAP SERPL CALCULATED.3IONS-SCNC: 14.6 MMOL/L
AST SERPL-CCNC: 15 U/L (ref 1–40)
BACTERIA UR QL AUTO: ABNORMAL /HPF
BASOPHILS # BLD AUTO: 0.02 10*3/MM3 (ref 0–0.2)
BASOPHILS NFR BLD AUTO: 0.2 % (ref 0–1.5)
BILIRUB SERPL-MCNC: 0.3 MG/DL (ref 0.1–1.2)
BILIRUB UR QL STRIP: NEGATIVE
BUN BLD-MCNC: 37 MG/DL (ref 8–23)
BUN/CREAT SERPL: 26.2 (ref 7–25)
CALCIUM SPEC-SCNC: 9.3 MG/DL (ref 8.6–10.5)
CHLORIDE SERPL-SCNC: 101 MMOL/L (ref 98–107)
CLARITY UR: ABNORMAL
CO2 SERPL-SCNC: 23.4 MMOL/L (ref 22–29)
COLOR UR: YELLOW
CREAT BLD-MCNC: 1.41 MG/DL (ref 0.76–1.27)
DEPRECATED RDW RBC AUTO: 45.3 FL (ref 37–54)
EOSINOPHIL # BLD AUTO: 0.02 10*3/MM3 (ref 0–0.7)
EOSINOPHIL NFR BLD AUTO: 0.2 % (ref 0.3–6.2)
ERYTHROCYTE [DISTWIDTH] IN BLOOD BY AUTOMATED COUNT: 16 % (ref 11.5–14.5)
GFR SERPL CREATININE-BSD FRML MDRD: 47 ML/MIN/1.73
GLOBULIN UR ELPH-MCNC: 3.3 GM/DL
GLUCOSE BLD-MCNC: 153 MG/DL (ref 65–99)
GLUCOSE BLDC GLUCOMTR-MCNC: 117 MG/DL (ref 70–130)
GLUCOSE UR STRIP-MCNC: NEGATIVE MG/DL
HCT VFR BLD AUTO: 26.8 % (ref 40.4–52.2)
HGB BLD-MCNC: 7.8 G/DL (ref 13.7–17.6)
HGB UR QL STRIP.AUTO: ABNORMAL
HOLD SPECIMEN: NORMAL
HOLD SPECIMEN: NORMAL
HYALINE CASTS UR QL AUTO: ABNORMAL /LPF
IMM GRANULOCYTES # BLD: 0.02 10*3/MM3 (ref 0–0.03)
IMM GRANULOCYTES NFR BLD: 0.2 % (ref 0–0.5)
KETONES UR QL STRIP: NEGATIVE
LEUKOCYTE ESTERASE UR QL STRIP.AUTO: ABNORMAL
LYMPHOCYTES # BLD AUTO: 1.44 10*3/MM3 (ref 0.9–4.8)
LYMPHOCYTES NFR BLD AUTO: 16.2 % (ref 19.6–45.3)
MAGNESIUM SERPL-MCNC: 2.5 MG/DL (ref 1.6–2.4)
MCH RBC QN AUTO: 22.5 PG (ref 27–32.7)
MCHC RBC AUTO-ENTMCNC: 29.1 G/DL (ref 32.6–36.4)
MCV RBC AUTO: 77.5 FL (ref 79.8–96.2)
MONOCYTES # BLD AUTO: 0.56 10*3/MM3 (ref 0.2–1.2)
MONOCYTES NFR BLD AUTO: 6.3 % (ref 5–12)
NEUTROPHILS # BLD AUTO: 6.85 10*3/MM3 (ref 1.9–8.1)
NEUTROPHILS NFR BLD AUTO: 76.9 % (ref 42.7–76)
NITRITE UR QL STRIP: NEGATIVE
PH UR STRIP.AUTO: 6 [PH] (ref 5–8)
PLATELET # BLD AUTO: 232 10*3/MM3 (ref 140–500)
PMV BLD AUTO: 9.7 FL (ref 6–12)
POTASSIUM BLD-SCNC: 4.4 MMOL/L (ref 3.5–5.2)
PROT SERPL-MCNC: 7.2 G/DL (ref 6–8.5)
PROT UR QL STRIP: NEGATIVE
RBC # BLD AUTO: 3.46 10*6/MM3 (ref 4.6–6)
RBC # UR: ABNORMAL /HPF
REF LAB TEST METHOD: ABNORMAL
SODIUM BLD-SCNC: 139 MMOL/L (ref 136–145)
SP GR UR STRIP: 1.01 (ref 1–1.03)
SQUAMOUS #/AREA URNS HPF: ABNORMAL /HPF
TROPONIN T SERPL-MCNC: <0.01 NG/ML (ref 0–0.03)
UROBILINOGEN UR QL STRIP: ABNORMAL
WBC NRBC COR # BLD: 8.91 10*3/MM3 (ref 4.5–10.7)
WBC UR QL AUTO: ABNORMAL /HPF
WHOLE BLOOD HOLD SPECIMEN: NORMAL
WHOLE BLOOD HOLD SPECIMEN: NORMAL

## 2017-02-12 PROCEDURE — 83735 ASSAY OF MAGNESIUM: CPT | Performed by: EMERGENCY MEDICINE

## 2017-02-12 PROCEDURE — 82962 GLUCOSE BLOOD TEST: CPT

## 2017-02-12 PROCEDURE — 81001 URINALYSIS AUTO W/SCOPE: CPT | Performed by: NURSE PRACTITIONER

## 2017-02-12 PROCEDURE — 99284 EMERGENCY DEPT VISIT MOD MDM: CPT

## 2017-02-12 PROCEDURE — 87086 URINE CULTURE/COLONY COUNT: CPT | Performed by: NURSE PRACTITIONER

## 2017-02-12 PROCEDURE — 87147 CULTURE TYPE IMMUNOLOGIC: CPT | Performed by: NURSE PRACTITIONER

## 2017-02-12 PROCEDURE — 80053 COMPREHEN METABOLIC PANEL: CPT | Performed by: EMERGENCY MEDICINE

## 2017-02-12 PROCEDURE — 84484 ASSAY OF TROPONIN QUANT: CPT | Performed by: EMERGENCY MEDICINE

## 2017-02-12 PROCEDURE — 93010 ELECTROCARDIOGRAM REPORT: CPT | Performed by: INTERNAL MEDICINE

## 2017-02-12 PROCEDURE — 85025 COMPLETE CBC W/AUTO DIFF WBC: CPT | Performed by: EMERGENCY MEDICINE

## 2017-02-12 PROCEDURE — 93005 ELECTROCARDIOGRAM TRACING: CPT

## 2017-02-12 PROCEDURE — 25010000002 HEPARIN (PORCINE) PER 1000 UNITS: Performed by: INTERNAL MEDICINE

## 2017-02-12 RX ORDER — SODIUM CHLORIDE 0.9 % (FLUSH) 0.9 %
1-10 SYRINGE (ML) INJECTION AS NEEDED
Status: DISCONTINUED | OUTPATIENT
Start: 2017-02-12 | End: 2017-02-13

## 2017-02-12 RX ORDER — NICOTINE POLACRILEX 4 MG
15 LOZENGE BUCCAL
Status: DISCONTINUED | OUTPATIENT
Start: 2017-02-12 | End: 2017-02-17 | Stop reason: HOSPADM

## 2017-02-12 RX ORDER — HEPARIN SODIUM 5000 [USP'U]/ML
5000 INJECTION, SOLUTION INTRAVENOUS; SUBCUTANEOUS EVERY 12 HOURS SCHEDULED
Status: DISCONTINUED | OUTPATIENT
Start: 2017-02-12 | End: 2017-02-13

## 2017-02-12 RX ORDER — TAMSULOSIN HYDROCHLORIDE 0.4 MG/1
0.4 CAPSULE ORAL NIGHTLY
Status: DISCONTINUED | OUTPATIENT
Start: 2017-02-12 | End: 2017-02-17 | Stop reason: HOSPADM

## 2017-02-12 RX ORDER — DEXTROSE MONOHYDRATE 25 G/50ML
25 INJECTION, SOLUTION INTRAVENOUS
Status: DISCONTINUED | OUTPATIENT
Start: 2017-02-12 | End: 2017-02-17 | Stop reason: HOSPADM

## 2017-02-12 RX ORDER — SODIUM CHLORIDE 9 MG/ML
75 INJECTION, SOLUTION INTRAVENOUS CONTINUOUS
Status: DISCONTINUED | OUTPATIENT
Start: 2017-02-12 | End: 2017-02-15

## 2017-02-12 RX ORDER — ALLOPURINOL 100 MG/1
100 TABLET ORAL DAILY
Status: DISCONTINUED | OUTPATIENT
Start: 2017-02-13 | End: 2017-02-17 | Stop reason: HOSPADM

## 2017-02-12 RX ORDER — FINASTERIDE 5 MG/1
5 TABLET, FILM COATED ORAL DAILY
Status: DISCONTINUED | OUTPATIENT
Start: 2017-02-13 | End: 2017-02-17 | Stop reason: HOSPADM

## 2017-02-12 RX ORDER — SODIUM CHLORIDE 0.9 % (FLUSH) 0.9 %
10 SYRINGE (ML) INJECTION AS NEEDED
Status: DISCONTINUED | OUTPATIENT
Start: 2017-02-12 | End: 2017-02-17 | Stop reason: HOSPADM

## 2017-02-12 RX ADMIN — SODIUM CHLORIDE 75 ML/HR: 9 INJECTION, SOLUTION INTRAVENOUS at 18:20

## 2017-02-12 RX ADMIN — HEPARIN SODIUM 5000 UNITS: 5000 INJECTION, SOLUTION INTRAVENOUS; SUBCUTANEOUS at 23:30

## 2017-02-12 RX ADMIN — TAMSULOSIN HYDROCHLORIDE 0.4 MG: 0.4 CAPSULE ORAL at 23:30

## 2017-02-13 PROBLEM — N40.0 BPH (BENIGN PROSTATIC HYPERPLASIA): Status: ACTIVE | Noted: 2017-02-13

## 2017-02-13 PROBLEM — D50.0 IRON DEFICIENCY ANEMIA DUE TO CHRONIC BLOOD LOSS: Status: ACTIVE | Noted: 2017-02-13

## 2017-02-13 PROBLEM — N18.30 CHRONIC KIDNEY DISEASE, STAGE 3 (HCC): Status: ACTIVE | Noted: 2017-02-13

## 2017-02-13 PROBLEM — R31.29 MICROSCOPIC HEMATURIA: Status: ACTIVE | Noted: 2017-02-13

## 2017-02-13 PROBLEM — R31.0 GROSS HEMATURIA: Status: ACTIVE | Noted: 2017-02-13

## 2017-02-13 PROBLEM — D62 ACUTE POSTHEMORRHAGIC ANEMIA: Status: ACTIVE | Noted: 2017-02-13

## 2017-02-13 LAB
ABO GROUP BLD: NORMAL
ANION GAP SERPL CALCULATED.3IONS-SCNC: 10.7 MMOL/L
ANTI-D: NORMAL
BLD GP AB SCN SERPL QL: NEGATIVE
BUN BLD-MCNC: 36 MG/DL (ref 8–23)
BUN/CREAT SERPL: 27.3 (ref 7–25)
CALCIUM SPEC-SCNC: 8.4 MG/DL (ref 8.6–10.5)
CHLORIDE SERPL-SCNC: 106 MMOL/L (ref 98–107)
CO2 SERPL-SCNC: 23.3 MMOL/L (ref 22–29)
CREAT BLD-MCNC: 1.32 MG/DL (ref 0.76–1.27)
DEPRECATED RDW RBC AUTO: 44.7 FL (ref 37–54)
ERYTHROCYTE [DISTWIDTH] IN BLOOD BY AUTOMATED COUNT: 16.1 % (ref 11.5–14.5)
FERRITIN SERPL-MCNC: 22.86 NG/ML (ref 30–400)
FOLATE SERPL-MCNC: 11.87 NG/ML (ref 4.78–24.2)
GASTROCULT GAST QL: POSITIVE
GFR SERPL CREATININE-BSD FRML MDRD: 51 ML/MIN/1.73
GLUCOSE BLD-MCNC: 100 MG/DL (ref 65–99)
GLUCOSE BLDC GLUCOMTR-MCNC: 111 MG/DL (ref 70–130)
GLUCOSE BLDC GLUCOMTR-MCNC: 123 MG/DL (ref 70–130)
GLUCOSE BLDC GLUCOMTR-MCNC: 125 MG/DL (ref 70–130)
GLUCOSE BLDC GLUCOMTR-MCNC: 132 MG/DL (ref 70–130)
HCT VFR BLD AUTO: 22.8 % (ref 40.4–52.2)
HGB BLD-MCNC: 6.5 G/DL (ref 13.7–17.6)
IRON 24H UR-MRATE: 11 MCG/DL (ref 59–158)
IRON SATN MFR SERPL: 3 % (ref 20–50)
MCH RBC QN AUTO: 22 PG (ref 27–32.7)
MCHC RBC AUTO-ENTMCNC: 28.5 G/DL (ref 32.6–36.4)
MCV RBC AUTO: 77 FL (ref 79.8–96.2)
PLATELET # BLD AUTO: 183 10*3/MM3 (ref 140–500)
PMV BLD AUTO: 10.2 FL (ref 6–12)
POTASSIUM BLD-SCNC: 4.5 MMOL/L (ref 3.5–5.2)
RBC # BLD AUTO: 2.96 10*6/MM3 (ref 4.6–6)
RH BLD: NEGATIVE
SODIUM BLD-SCNC: 140 MMOL/L (ref 136–145)
TIBC SERPL-MCNC: 387 MCG/DL (ref 298–536)
TRANSFERRIN SERPL-MCNC: 260 MG/DL (ref 200–360)
VIT B12 BLD-MCNC: 428 PG/ML (ref 211–946)
WBC NRBC COR # BLD: 5.82 10*3/MM3 (ref 4.5–10.7)

## 2017-02-13 PROCEDURE — 99222 1ST HOSP IP/OBS MODERATE 55: CPT | Performed by: INTERNAL MEDICINE

## 2017-02-13 PROCEDURE — P9016 RBC LEUKOCYTES REDUCED: HCPCS

## 2017-02-13 PROCEDURE — 83540 ASSAY OF IRON: CPT | Performed by: HOSPITALIST

## 2017-02-13 PROCEDURE — 82728 ASSAY OF FERRITIN: CPT | Performed by: HOSPITALIST

## 2017-02-13 PROCEDURE — 36430 TRANSFUSION BLD/BLD COMPNT: CPT

## 2017-02-13 PROCEDURE — 86901 BLOOD TYPING SEROLOGIC RH(D): CPT

## 2017-02-13 PROCEDURE — 84466 ASSAY OF TRANSFERRIN: CPT | Performed by: HOSPITALIST

## 2017-02-13 PROCEDURE — 85027 COMPLETE CBC AUTOMATED: CPT | Performed by: HOSPITALIST

## 2017-02-13 PROCEDURE — 86900 BLOOD TYPING SEROLOGIC ABO: CPT

## 2017-02-13 PROCEDURE — 80048 BASIC METABOLIC PNL TOTAL CA: CPT | Performed by: HOSPITALIST

## 2017-02-13 PROCEDURE — 86920 COMPATIBILITY TEST SPIN: CPT

## 2017-02-13 PROCEDURE — 82962 GLUCOSE BLOOD TEST: CPT

## 2017-02-13 PROCEDURE — 86922 COMPATIBILITY TEST ANTIGLOB: CPT

## 2017-02-13 PROCEDURE — 86850 RBC ANTIBODY SCREEN: CPT

## 2017-02-13 PROCEDURE — 82270 OCCULT BLOOD FECES: CPT | Performed by: HOSPITALIST

## 2017-02-13 PROCEDURE — 82746 ASSAY OF FOLIC ACID SERUM: CPT | Performed by: HOSPITALIST

## 2017-02-13 PROCEDURE — 82607 VITAMIN B-12: CPT | Performed by: HOSPITALIST

## 2017-02-13 PROCEDURE — 86870 RBC ANTIBODY IDENTIFICATION: CPT

## 2017-02-13 RX ORDER — PETROLATUM 42 G/100G
OINTMENT TOPICAL EVERY 12 HOURS SCHEDULED
Status: DISCONTINUED | OUTPATIENT
Start: 2017-02-13 | End: 2017-02-17 | Stop reason: HOSPADM

## 2017-02-13 RX ORDER — PANTOPRAZOLE SODIUM 40 MG/1
40 TABLET, DELAYED RELEASE ORAL
Status: DISCONTINUED | OUTPATIENT
Start: 2017-02-13 | End: 2017-02-17 | Stop reason: HOSPADM

## 2017-02-13 RX ADMIN — SODIUM CHLORIDE 75 ML/HR: 9 INJECTION, SOLUTION INTRAVENOUS at 04:04

## 2017-02-13 RX ADMIN — FINASTERIDE 5 MG: 5 TABLET, FILM COATED ORAL at 09:06

## 2017-02-13 RX ADMIN — PETROLATUM 1 APPLICATION: 42 OINTMENT TOPICAL at 17:18

## 2017-02-13 RX ADMIN — PANTOPRAZOLE SODIUM 40 MG: 40 TABLET, DELAYED RELEASE ORAL at 17:18

## 2017-02-13 RX ADMIN — PANTOPRAZOLE SODIUM 40 MG: 40 TABLET, DELAYED RELEASE ORAL at 11:37

## 2017-02-13 RX ADMIN — TAMSULOSIN HYDROCHLORIDE 0.4 MG: 0.4 CAPSULE ORAL at 19:58

## 2017-02-13 RX ADMIN — ALLOPURINOL 100 MG: 100 TABLET ORAL at 09:06

## 2017-02-14 LAB
ABO + RH BLD: NORMAL
ABO + RH BLD: NORMAL
ANION GAP SERPL CALCULATED.3IONS-SCNC: 9.4 MMOL/L
BACTERIA SPEC AEROBE CULT: ABNORMAL
BH BB BLOOD EXPIRATION DATE: NORMAL
BH BB BLOOD EXPIRATION DATE: NORMAL
BH BB BLOOD TYPE BARCODE: 600
BH BB BLOOD TYPE BARCODE: 600
BH BB DISPENSE STATUS: NORMAL
BH BB DISPENSE STATUS: NORMAL
BH BB PRODUCT CODE: NORMAL
BH BB PRODUCT CODE: NORMAL
BH BB UNIT NUMBER: NORMAL
BH BB UNIT NUMBER: NORMAL
BUN BLD-MCNC: 32 MG/DL (ref 8–23)
BUN/CREAT SERPL: 25.2 (ref 7–25)
CALCIUM SPEC-SCNC: 8.7 MG/DL (ref 8.6–10.5)
CHLORIDE SERPL-SCNC: 107 MMOL/L (ref 98–107)
CO2 SERPL-SCNC: 24.6 MMOL/L (ref 22–29)
CREAT BLD-MCNC: 1.27 MG/DL (ref 0.76–1.27)
CROSSMATCH INTERPRETATION: NORMAL
CROSSMATCH INTERPRETATION: NORMAL
DEPRECATED RDW RBC AUTO: 45.6 FL (ref 37–54)
ERYTHROCYTE [DISTWIDTH] IN BLOOD BY AUTOMATED COUNT: 16.2 % (ref 11.5–14.5)
GFR SERPL CREATININE-BSD FRML MDRD: 54 ML/MIN/1.73
GLUCOSE BLD-MCNC: 102 MG/DL (ref 65–99)
GLUCOSE BLDC GLUCOMTR-MCNC: 105 MG/DL (ref 70–130)
GLUCOSE BLDC GLUCOMTR-MCNC: 105 MG/DL (ref 70–130)
GLUCOSE BLDC GLUCOMTR-MCNC: 110 MG/DL (ref 70–130)
GLUCOSE BLDC GLUCOMTR-MCNC: 131 MG/DL (ref 70–130)
HCT VFR BLD AUTO: 29 % (ref 40.4–52.2)
HCT VFR BLD AUTO: 29.2 % (ref 40.4–52.2)
HGB BLD-MCNC: 8.8 G/DL (ref 13.7–17.6)
HGB BLD-MCNC: 9 G/DL (ref 13.7–17.6)
MCH RBC QN AUTO: 23.3 PG (ref 27–32.7)
MCHC RBC AUTO-ENTMCNC: 30.3 G/DL (ref 32.6–36.4)
MCV RBC AUTO: 76.9 FL (ref 79.8–96.2)
PLATELET # BLD AUTO: 193 10*3/MM3 (ref 140–500)
PMV BLD AUTO: 9.9 FL (ref 6–12)
POTASSIUM BLD-SCNC: 4.4 MMOL/L (ref 3.5–5.2)
RBC # BLD AUTO: 3.77 10*6/MM3 (ref 4.6–6)
SODIUM BLD-SCNC: 141 MMOL/L (ref 136–145)
UNIT  ABO: NORMAL
UNIT  ABO: NORMAL
UNIT  RH: NORMAL
UNIT  RH: NORMAL
WBC NRBC COR # BLD: 6.85 10*3/MM3 (ref 4.5–10.7)

## 2017-02-14 PROCEDURE — 99232 SBSQ HOSP IP/OBS MODERATE 35: CPT | Performed by: INTERNAL MEDICINE

## 2017-02-14 PROCEDURE — 99221 1ST HOSP IP/OBS SF/LOW 40: CPT | Performed by: INTERNAL MEDICINE

## 2017-02-14 PROCEDURE — 85014 HEMATOCRIT: CPT | Performed by: HOSPITALIST

## 2017-02-14 PROCEDURE — 82962 GLUCOSE BLOOD TEST: CPT

## 2017-02-14 PROCEDURE — 85018 HEMOGLOBIN: CPT | Performed by: HOSPITALIST

## 2017-02-14 PROCEDURE — 80048 BASIC METABOLIC PNL TOTAL CA: CPT | Performed by: HOSPITALIST

## 2017-02-14 PROCEDURE — 85027 COMPLETE CBC AUTOMATED: CPT | Performed by: HOSPITALIST

## 2017-02-14 RX ORDER — POLYETHYLENE GLYCOL 3350, SODIUM CHLORIDE, POTASSIUM CHLORIDE, SODIUM BICARBONATE, AND SODIUM SULFATE 240; 5.84; 2.98; 6.72; 22.72 G/4L; G/4L; G/4L; G/4L; G/4L
4000 POWDER, FOR SOLUTION ORAL EVERY 8 HOURS SCHEDULED
Status: COMPLETED | OUTPATIENT
Start: 2017-02-14 | End: 2017-02-15

## 2017-02-14 RX ADMIN — ALLOPURINOL 100 MG: 100 TABLET ORAL at 08:36

## 2017-02-14 RX ADMIN — PANTOPRAZOLE SODIUM 40 MG: 40 TABLET, DELAYED RELEASE ORAL at 17:58

## 2017-02-14 RX ADMIN — PETROLATUM 1 APPLICATION: 42 OINTMENT TOPICAL at 11:00

## 2017-02-14 RX ADMIN — FINASTERIDE 5 MG: 5 TABLET, FILM COATED ORAL at 08:36

## 2017-02-14 RX ADMIN — PETROLATUM: 42 OINTMENT TOPICAL at 20:34

## 2017-02-14 RX ADMIN — SODIUM CHLORIDE 75 ML/HR: 9 INJECTION, SOLUTION INTRAVENOUS at 17:19

## 2017-02-14 RX ADMIN — POLYETHYLENE GLYCOL 3350, SODIUM CHLORIDE, POTASSIUM CHLORIDE, SODIUM BICARBONATE, AND SODIUM SULFATE 4000 ML: 240; 5.84; 2.98; 6.72; 22.72 POWDER, FOR SOLUTION ORAL at 20:33

## 2017-02-14 RX ADMIN — TAMSULOSIN HYDROCHLORIDE 0.4 MG: 0.4 CAPSULE ORAL at 20:33

## 2017-02-14 RX ADMIN — PANTOPRAZOLE SODIUM 40 MG: 40 TABLET, DELAYED RELEASE ORAL at 06:25

## 2017-02-15 ENCOUNTER — ANESTHESIA (OUTPATIENT)
Dept: GASTROENTEROLOGY | Facility: HOSPITAL | Age: 82
End: 2017-02-15

## 2017-02-15 ENCOUNTER — ANESTHESIA EVENT (OUTPATIENT)
Dept: GASTROENTEROLOGY | Facility: HOSPITAL | Age: 82
End: 2017-02-15

## 2017-02-15 LAB
ANION GAP SERPL CALCULATED.3IONS-SCNC: 14.4 MMOL/L
BASOPHILS # BLD AUTO: 0.02 10*3/MM3 (ref 0–0.2)
BASOPHILS NFR BLD AUTO: 0.3 % (ref 0–1.5)
BUN BLD-MCNC: 24 MG/DL (ref 8–23)
BUN/CREAT SERPL: 21.4 (ref 7–25)
CALCIUM SPEC-SCNC: 8.4 MG/DL (ref 8.6–10.5)
CHLORIDE SERPL-SCNC: 106 MMOL/L (ref 98–107)
CO2 SERPL-SCNC: 19.6 MMOL/L (ref 22–29)
CREAT BLD-MCNC: 1.12 MG/DL (ref 0.76–1.27)
DEPRECATED RDW RBC AUTO: 47.2 FL (ref 37–54)
EOSINOPHIL # BLD AUTO: 0.11 10*3/MM3 (ref 0–0.7)
EOSINOPHIL NFR BLD AUTO: 1.4 % (ref 0.3–6.2)
ERYTHROCYTE [DISTWIDTH] IN BLOOD BY AUTOMATED COUNT: 16.5 % (ref 11.5–14.5)
GFR SERPL CREATININE-BSD FRML MDRD: 62 ML/MIN/1.73
GLUCOSE BLD-MCNC: 98 MG/DL (ref 65–99)
GLUCOSE BLDC GLUCOMTR-MCNC: 124 MG/DL (ref 70–130)
GLUCOSE BLDC GLUCOMTR-MCNC: 83 MG/DL (ref 70–130)
GLUCOSE BLDC GLUCOMTR-MCNC: 93 MG/DL (ref 70–130)
GLUCOSE BLDC GLUCOMTR-MCNC: 96 MG/DL (ref 70–130)
HCT VFR BLD AUTO: 30.2 % (ref 40.4–52.2)
HGB BLD-MCNC: 9.2 G/DL (ref 13.7–17.6)
IMM GRANULOCYTES # BLD: 0 10*3/MM3 (ref 0–0.03)
IMM GRANULOCYTES NFR BLD: 0 % (ref 0–0.5)
INR PPP: 1.26 (ref 0.9–1.1)
LYMPHOCYTES # BLD AUTO: 1.45 10*3/MM3 (ref 0.9–4.8)
LYMPHOCYTES NFR BLD AUTO: 19 % (ref 19.6–45.3)
MCH RBC QN AUTO: 23.8 PG (ref 27–32.7)
MCHC RBC AUTO-ENTMCNC: 30.5 G/DL (ref 32.6–36.4)
MCV RBC AUTO: 78.2 FL (ref 79.8–96.2)
MONOCYTES # BLD AUTO: 0.7 10*3/MM3 (ref 0.2–1.2)
MONOCYTES NFR BLD AUTO: 9.2 % (ref 5–12)
NEUTROPHILS # BLD AUTO: 5.37 10*3/MM3 (ref 1.9–8.1)
NEUTROPHILS NFR BLD AUTO: 70.1 % (ref 42.7–76)
PLATELET # BLD AUTO: 176 10*3/MM3 (ref 140–500)
PMV BLD AUTO: 9.9 FL (ref 6–12)
POTASSIUM BLD-SCNC: 4.4 MMOL/L (ref 3.5–5.2)
PROTHROMBIN TIME: 15.4 SECONDS (ref 11.7–14.2)
RBC # BLD AUTO: 3.86 10*6/MM3 (ref 4.6–6)
SODIUM BLD-SCNC: 140 MMOL/L (ref 136–145)
WBC NRBC COR # BLD: 7.65 10*3/MM3 (ref 4.5–10.7)

## 2017-02-15 PROCEDURE — 88342 IMHCHEM/IMCYTCHM 1ST ANTB: CPT | Performed by: INTERNAL MEDICINE

## 2017-02-15 PROCEDURE — 99232 SBSQ HOSP IP/OBS MODERATE 35: CPT | Performed by: INTERNAL MEDICINE

## 2017-02-15 PROCEDURE — 88305 TISSUE EXAM BY PATHOLOGIST: CPT | Performed by: INTERNAL MEDICINE

## 2017-02-15 PROCEDURE — 0DB68ZX EXCISION OF STOMACH, VIA NATURAL OR ARTIFICIAL OPENING ENDOSCOPIC, DIAGNOSTIC: ICD-10-PCS | Performed by: INTERNAL MEDICINE

## 2017-02-15 PROCEDURE — 80048 BASIC METABOLIC PNL TOTAL CA: CPT | Performed by: NURSE PRACTITIONER

## 2017-02-15 PROCEDURE — 43239 EGD BIOPSY SINGLE/MULTIPLE: CPT | Performed by: INTERNAL MEDICINE

## 2017-02-15 PROCEDURE — 85610 PROTHROMBIN TIME: CPT | Performed by: NURSE PRACTITIONER

## 2017-02-15 PROCEDURE — 0DBP8ZZ EXCISION OF RECTUM, VIA NATURAL OR ARTIFICIAL OPENING ENDOSCOPIC: ICD-10-PCS | Performed by: INTERNAL MEDICINE

## 2017-02-15 PROCEDURE — 85025 COMPLETE CBC W/AUTO DIFF WBC: CPT | Performed by: NURSE PRACTITIONER

## 2017-02-15 PROCEDURE — 88312 SPECIAL STAINS GROUP 1: CPT | Performed by: INTERNAL MEDICINE

## 2017-02-15 PROCEDURE — 45385 COLONOSCOPY W/LESION REMOVAL: CPT | Performed by: INTERNAL MEDICINE

## 2017-02-15 PROCEDURE — 82962 GLUCOSE BLOOD TEST: CPT

## 2017-02-15 PROCEDURE — 93005 ELECTROCARDIOGRAM TRACING: CPT | Performed by: INTERNAL MEDICINE

## 2017-02-15 PROCEDURE — 25010000002 PROPOFOL 10 MG/ML EMULSION: Performed by: ANESTHESIOLOGY

## 2017-02-15 PROCEDURE — 93010 ELECTROCARDIOGRAM REPORT: CPT | Performed by: INTERNAL MEDICINE

## 2017-02-15 PROCEDURE — 25010000002 IRON SUCROSE PER 1 MG: Performed by: HOSPITALIST

## 2017-02-15 PROCEDURE — 0DBH8ZZ EXCISION OF CECUM, VIA NATURAL OR ARTIFICIAL OPENING ENDOSCOPIC: ICD-10-PCS | Performed by: INTERNAL MEDICINE

## 2017-02-15 RX ORDER — PROPOFOL 10 MG/ML
VIAL (ML) INTRAVENOUS AS NEEDED
Status: DISCONTINUED | OUTPATIENT
Start: 2017-02-15 | End: 2017-02-15 | Stop reason: SURG

## 2017-02-15 RX ORDER — PROPOFOL 10 MG/ML
VIAL (ML) INTRAVENOUS CONTINUOUS PRN
Status: DISCONTINUED | OUTPATIENT
Start: 2017-02-15 | End: 2017-02-15 | Stop reason: SURG

## 2017-02-15 RX ORDER — SODIUM CHLORIDE, SODIUM LACTATE, POTASSIUM CHLORIDE, CALCIUM CHLORIDE 600; 310; 30; 20 MG/100ML; MG/100ML; MG/100ML; MG/100ML
30 INJECTION, SOLUTION INTRAVENOUS CONTINUOUS PRN
Status: DISCONTINUED | OUTPATIENT
Start: 2017-02-15 | End: 2017-02-17 | Stop reason: HOSPADM

## 2017-02-15 RX ORDER — FERROUS SULFATE 325(65) MG
325 TABLET ORAL
Status: DISCONTINUED | OUTPATIENT
Start: 2017-02-15 | End: 2017-02-17 | Stop reason: HOSPADM

## 2017-02-15 RX ORDER — SODIUM CHLORIDE, SODIUM LACTATE, POTASSIUM CHLORIDE, CALCIUM CHLORIDE 600; 310; 30; 20 MG/100ML; MG/100ML; MG/100ML; MG/100ML
INJECTION, SOLUTION INTRAVENOUS CONTINUOUS PRN
Status: DISCONTINUED | OUTPATIENT
Start: 2017-02-15 | End: 2017-02-15 | Stop reason: SURG

## 2017-02-15 RX ORDER — SODIUM CHLORIDE 0.9 % (FLUSH) 0.9 %
1-10 SYRINGE (ML) INJECTION AS NEEDED
Status: DISCONTINUED | OUTPATIENT
Start: 2017-02-15 | End: 2017-02-17 | Stop reason: HOSPADM

## 2017-02-15 RX ORDER — SODIUM CHLORIDE 9 MG/ML
30 INJECTION, SOLUTION INTRAVENOUS CONTINUOUS PRN
Status: DISCONTINUED | OUTPATIENT
Start: 2017-02-15 | End: 2017-02-17 | Stop reason: HOSPADM

## 2017-02-15 RX ADMIN — PETROLATUM: 42 OINTMENT TOPICAL at 21:49

## 2017-02-15 RX ADMIN — TAMSULOSIN HYDROCHLORIDE 0.4 MG: 0.4 CAPSULE ORAL at 21:49

## 2017-02-15 RX ADMIN — PROPOFOL 50 MG: 10 INJECTION, EMULSION INTRAVENOUS at 13:40

## 2017-02-15 RX ADMIN — SODIUM CHLORIDE, POTASSIUM CHLORIDE, SODIUM LACTATE AND CALCIUM CHLORIDE: 600; 310; 30; 20 INJECTION, SOLUTION INTRAVENOUS at 13:33

## 2017-02-15 RX ADMIN — SODIUM CHLORIDE 30 ML/HR: 9 INJECTION, SOLUTION INTRAVENOUS at 12:36

## 2017-02-15 RX ADMIN — ALLOPURINOL 100 MG: 100 TABLET ORAL at 17:21

## 2017-02-15 RX ADMIN — SODIUM CHLORIDE 75 ML/HR: 9 INJECTION, SOLUTION INTRAVENOUS at 06:58

## 2017-02-15 RX ADMIN — FERROUS SULFATE TAB 325 MG (65 MG ELEMENTAL FE) 325 MG: 325 (65 FE) TAB at 17:21

## 2017-02-15 RX ADMIN — PETROLATUM 1 APPLICATION: 42 OINTMENT TOPICAL at 10:00

## 2017-02-15 RX ADMIN — FINASTERIDE 5 MG: 5 TABLET, FILM COATED ORAL at 17:21

## 2017-02-15 RX ADMIN — PANTOPRAZOLE SODIUM 40 MG: 40 TABLET, DELAYED RELEASE ORAL at 17:21

## 2017-02-15 RX ADMIN — IRON SUCROSE 300 MG: 20 INJECTION, SOLUTION INTRAVENOUS at 16:22

## 2017-02-15 RX ADMIN — POLYETHYLENE GLYCOL 3350, SODIUM CHLORIDE, POTASSIUM CHLORIDE, SODIUM BICARBONATE, AND SODIUM SULFATE 4000 ML: 240; 5.84; 2.98; 6.72; 22.72 POWDER, FOR SOLUTION ORAL at 07:32

## 2017-02-15 RX ADMIN — PROPOFOL 120 MCG/KG/MIN: 10 INJECTION, EMULSION INTRAVENOUS at 13:45

## 2017-02-15 NOTE — ANESTHESIA POSTPROCEDURE EVALUATION
Patient: Imtiaz Jones    Procedure Summary     Date Anesthesia Start Anesthesia Stop Room / Location    02/15/17 [id number]  [facility]       Procedure Diagnosis Surgeon Provider    COLONOSCOPY to cecum and TI with cold and hot snare polypectomies (N/A ); ESOPHAGOGASTRODUODENOSCOPY with biopsies (N/A Esophagus) Iron deficiency anemia due to chronic blood loss  (Iron deficiency anemia due to chronic blood loss [D50.0]) MD Lobo Rico MD          Anesthesia Type: MAC  Last vitals  BP     Temp      Pulse     Resp      SpO2        Post Anesthesia Care and Evaluation    Patient location during evaluation: bedside  Patient participation: complete - patient participated  Level of consciousness: awake  Pain score: 2  Pain management: adequate  Airway patency: patent  Anesthetic complications: No anesthetic complications    Cardiovascular status: acceptable  Respiratory status: acceptable  Hydration status: acceptable

## 2017-02-15 NOTE — ANESTHESIA PREPROCEDURE EVALUATION
Anesthesia Evaluation     Patient summary reviewed and Nursing notes reviewed   no history of anesthetic complications:  NPO Status: > 8 hours   Airway   Mallampati: II  TM distance: >3 FB  Neck ROM: full  Dental - normal exam     Pulmonary - normal exam   (+) a smoker Former,   Cardiovascular - normal exam    (+) hypertension, CAD, CABG, PVD,       Neuro/Psych  (+) numbness,    GI/Hepatic/Renal/Endo    (+)  chronic renal disease CRI, diabetes mellitus type 2,     Musculoskeletal (-) negative ROS    Abdominal    Substance History      OB/GYN          Other                                    Anesthesia Plan    ASA 3     MAC     Anesthetic plan and risks discussed with patient.

## 2017-02-16 PROBLEM — K57.90 DIVERTICULOSIS: Status: ACTIVE | Noted: 2017-02-16

## 2017-02-16 PROBLEM — K63.5 COLON POLYPS: Status: ACTIVE | Noted: 2017-02-16

## 2017-02-16 PROBLEM — K55.20 ANGIODYSPLASIA OF COLON: Status: ACTIVE | Noted: 2017-02-16

## 2017-02-16 PROBLEM — I48.91 ATRIAL FIBRILLATION (HCC): Status: ACTIVE | Noted: 2017-02-16

## 2017-02-16 LAB
ANION GAP SERPL CALCULATED.3IONS-SCNC: 15.3 MMOL/L
BUN BLD-MCNC: 22 MG/DL (ref 8–23)
BUN/CREAT SERPL: 19 (ref 7–25)
CALCIUM SPEC-SCNC: 8.6 MG/DL (ref 8.6–10.5)
CHLORIDE SERPL-SCNC: 106 MMOL/L (ref 98–107)
CO2 SERPL-SCNC: 19.7 MMOL/L (ref 22–29)
CREAT BLD-MCNC: 1.16 MG/DL (ref 0.76–1.27)
DEPRECATED RDW RBC AUTO: 47.8 FL (ref 37–54)
ERYTHROCYTE [DISTWIDTH] IN BLOOD BY AUTOMATED COUNT: 16.8 % (ref 11.5–14.5)
GFR SERPL CREATININE-BSD FRML MDRD: 59 ML/MIN/1.73
GLUCOSE BLD-MCNC: 90 MG/DL (ref 65–99)
GLUCOSE BLDC GLUCOMTR-MCNC: 107 MG/DL (ref 70–130)
GLUCOSE BLDC GLUCOMTR-MCNC: 127 MG/DL (ref 70–130)
GLUCOSE BLDC GLUCOMTR-MCNC: 130 MG/DL (ref 70–130)
GLUCOSE BLDC GLUCOMTR-MCNC: 90 MG/DL (ref 70–130)
HCT VFR BLD AUTO: 31.9 % (ref 40.4–52.2)
HGB BLD-MCNC: 9.4 G/DL (ref 13.7–17.6)
MCH RBC QN AUTO: 23.1 PG (ref 27–32.7)
MCHC RBC AUTO-ENTMCNC: 29.5 G/DL (ref 32.6–36.4)
MCV RBC AUTO: 78.4 FL (ref 79.8–96.2)
PLATELET # BLD AUTO: 212 10*3/MM3 (ref 140–500)
PMV BLD AUTO: 10.2 FL (ref 6–12)
POTASSIUM BLD-SCNC: 4.2 MMOL/L (ref 3.5–5.2)
RBC # BLD AUTO: 4.07 10*6/MM3 (ref 4.6–6)
SODIUM BLD-SCNC: 141 MMOL/L (ref 136–145)
TSH SERPL DL<=0.05 MIU/L-ACNC: 2.57 MIU/ML (ref 0.27–4.2)
WBC NRBC COR # BLD: 7.86 10*3/MM3 (ref 4.5–10.7)

## 2017-02-16 PROCEDURE — 80048 BASIC METABOLIC PNL TOTAL CA: CPT | Performed by: HOSPITALIST

## 2017-02-16 PROCEDURE — 99232 SBSQ HOSP IP/OBS MODERATE 35: CPT | Performed by: INTERNAL MEDICINE

## 2017-02-16 PROCEDURE — 82962 GLUCOSE BLOOD TEST: CPT

## 2017-02-16 PROCEDURE — 93005 ELECTROCARDIOGRAM TRACING: CPT | Performed by: INTERNAL MEDICINE

## 2017-02-16 PROCEDURE — 97161 PT EVAL LOW COMPLEX 20 MIN: CPT

## 2017-02-16 PROCEDURE — 93010 ELECTROCARDIOGRAM REPORT: CPT | Performed by: INTERNAL MEDICINE

## 2017-02-16 PROCEDURE — 94762 N-INVAS EAR/PLS OXIMTRY CONT: CPT

## 2017-02-16 PROCEDURE — 97110 THERAPEUTIC EXERCISES: CPT

## 2017-02-16 PROCEDURE — 94799 UNLISTED PULMONARY SVC/PX: CPT

## 2017-02-16 PROCEDURE — 85027 COMPLETE CBC AUTOMATED: CPT | Performed by: HOSPITALIST

## 2017-02-16 PROCEDURE — 99231 SBSQ HOSP IP/OBS SF/LOW 25: CPT | Performed by: INTERNAL MEDICINE

## 2017-02-16 PROCEDURE — 84443 ASSAY THYROID STIM HORMONE: CPT | Performed by: INTERNAL MEDICINE

## 2017-02-16 RX ADMIN — FERROUS SULFATE TAB 325 MG (65 MG ELEMENTAL FE) 325 MG: 325 (65 FE) TAB at 17:14

## 2017-02-16 RX ADMIN — TAMSULOSIN HYDROCHLORIDE 0.4 MG: 0.4 CAPSULE ORAL at 20:00

## 2017-02-16 RX ADMIN — FINASTERIDE 5 MG: 5 TABLET, FILM COATED ORAL at 08:10

## 2017-02-16 RX ADMIN — PETROLATUM: 42 OINTMENT TOPICAL at 08:10

## 2017-02-16 RX ADMIN — PETROLATUM: 42 OINTMENT TOPICAL at 20:00

## 2017-02-16 RX ADMIN — FERROUS SULFATE TAB 325 MG (65 MG ELEMENTAL FE) 325 MG: 325 (65 FE) TAB at 08:10

## 2017-02-16 RX ADMIN — ALLOPURINOL 100 MG: 100 TABLET ORAL at 08:10

## 2017-02-16 RX ADMIN — FERROUS SULFATE TAB 325 MG (65 MG ELEMENTAL FE) 325 MG: 325 (65 FE) TAB at 11:59

## 2017-02-16 RX ADMIN — PANTOPRAZOLE SODIUM 40 MG: 40 TABLET, DELAYED RELEASE ORAL at 06:32

## 2017-02-16 RX ADMIN — PANTOPRAZOLE SODIUM 40 MG: 40 TABLET, DELAYED RELEASE ORAL at 17:14

## 2017-02-17 ENCOUNTER — APPOINTMENT (OUTPATIENT)
Dept: GENERAL RADIOLOGY | Facility: HOSPITAL | Age: 82
End: 2017-02-17
Attending: INTERNAL MEDICINE

## 2017-02-17 ENCOUNTER — APPOINTMENT (OUTPATIENT)
Dept: CARDIOLOGY | Facility: HOSPITAL | Age: 82
End: 2017-02-17
Attending: INTERNAL MEDICINE

## 2017-02-17 VITALS
SYSTOLIC BLOOD PRESSURE: 116 MMHG | HEART RATE: 92 BPM | HEIGHT: 70 IN | OXYGEN SATURATION: 92 % | RESPIRATION RATE: 18 BRPM | DIASTOLIC BLOOD PRESSURE: 66 MMHG | TEMPERATURE: 98.4 F | WEIGHT: 170 LBS | BODY MASS INDEX: 24.34 KG/M2

## 2017-02-17 PROBLEM — I50.22 CHRONIC SYSTOLIC CONGESTIVE HEART FAILURE (HCC): Status: ACTIVE | Noted: 2017-02-17

## 2017-02-17 PROBLEM — R55 NEAR SYNCOPE: Status: RESOLVED | Noted: 2017-02-12 | Resolved: 2017-02-17

## 2017-02-17 LAB
ASCENDING AORTA: 2.7 CM
BASOPHILS # BLD AUTO: 0.02 10*3/MM3 (ref 0–0.2)
BASOPHILS NFR BLD AUTO: 0.3 % (ref 0–1.5)
BH CV ECHO MEAS - ACS: 1.8 CM
BH CV ECHO MEAS - AO MEAN PG (FULL): 3 MMHG
BH CV ECHO MEAS - AO MEAN PG: 6 MMHG
BH CV ECHO MEAS - AO ROOT AREA (BSA CORRECTED): 1.5
BH CV ECHO MEAS - AO ROOT AREA: 6.6 CM^2
BH CV ECHO MEAS - AO ROOT DIAM: 2.9 CM
BH CV ECHO MEAS - AO V2 MEAN: 116 CM/SEC
BH CV ECHO MEAS - AO V2 VTI: 37.4 CM
BH CV ECHO MEAS - ASC AORTA: 2.7 CM
BH CV ECHO MEAS - AVA(I,A): 1.8 CM^2
BH CV ECHO MEAS - AVA(I,D): 1.8 CM^2
BH CV ECHO MEAS - BSA(HAYCOCK): 2 M^2
BH CV ECHO MEAS - BSA: 1.9 M^2
BH CV ECHO MEAS - BZI_BMI: 24.4 KILOGRAMS/M^2
BH CV ECHO MEAS - BZI_METRIC_HEIGHT: 177.8 CM
BH CV ECHO MEAS - BZI_METRIC_WEIGHT: 77.1 KG
BH CV ECHO MEAS - CONTRAST EF (2CH): 32.7 ML/M^2
BH CV ECHO MEAS - CONTRAST EF 4CH: 30.4 ML/M^2
BH CV ECHO MEAS - EDV(CUBED): 140.6 ML
BH CV ECHO MEAS - EDV(MOD-SP2): 168 ML
BH CV ECHO MEAS - EDV(MOD-SP4): 148 ML
BH CV ECHO MEAS - EDV(TEICH): 129.5 ML
BH CV ECHO MEAS - EF(CUBED): 54.5 %
BH CV ECHO MEAS - EF(MOD-SP2): 32.7 %
BH CV ECHO MEAS - EF(MOD-SP4): 36 %
BH CV ECHO MEAS - EF(TEICH): 45.9 %
BH CV ECHO MEAS - ESV(CUBED): 64 ML
BH CV ECHO MEAS - ESV(MOD-SP2): 113 ML
BH CV ECHO MEAS - ESV(MOD-SP4): 103 ML
BH CV ECHO MEAS - ESV(TEICH): 70 ML
BH CV ECHO MEAS - FS: 23.1 %
BH CV ECHO MEAS - IVS/LVPW: 1.8
BH CV ECHO MEAS - IVSD: 1.6 CM
BH CV ECHO MEAS - LAT PEAK E' VEL: 7 CM/SEC
BH CV ECHO MEAS - LV DIASTOLIC VOL/BSA (35-75): 76 ML/M^2
BH CV ECHO MEAS - LV MASS(C)D: 263.5 GRAMS
BH CV ECHO MEAS - LV MASS(C)DI: 135.2 GRAMS/M^2
BH CV ECHO MEAS - LV MEAN PG: 3 MMHG
BH CV ECHO MEAS - LV SYSTOLIC VOL/BSA (12-30): 52.9 ML/M^2
BH CV ECHO MEAS - LV V1 MEAN: 82.7 CM/SEC
BH CV ECHO MEAS - LV V1 VTI: 25.9 CM
BH CV ECHO MEAS - LVIDD: 5.2 CM
BH CV ECHO MEAS - LVIDS: 4 CM
BH CV ECHO MEAS - LVLD AP2: 8.7 CM
BH CV ECHO MEAS - LVLD AP4: 8.6 CM
BH CV ECHO MEAS - LVLS AP2: 8.5 CM
BH CV ECHO MEAS - LVLS AP4: 7.8 CM
BH CV ECHO MEAS - LVOT AREA (M): 2.5 CM^2
BH CV ECHO MEAS - LVOT AREA: 2.5 CM^2
BH CV ECHO MEAS - LVOT DIAM: 1.8 CM
BH CV ECHO MEAS - LVPWD: 0.9 CM
BH CV ECHO MEAS - MED PEAK E' VEL: 6 CM/SEC
BH CV ECHO MEAS - MR MAX PG: 137.6 MMHG
BH CV ECHO MEAS - MR MAX VEL: 586 CM/SEC
BH CV ECHO MEAS - MV A DUR: 0.1 SEC
BH CV ECHO MEAS - MV A MAX VEL: 147 CM/SEC
BH CV ECHO MEAS - MV DEC SLOPE: 700 CM/SEC^2
BH CV ECHO MEAS - MV DEC TIME: 0.12 SEC
BH CV ECHO MEAS - MV E MAX VEL: 141 CM/SEC
BH CV ECHO MEAS - MV E/A: 0.96
BH CV ECHO MEAS - MV MEAN PG: 6 MMHG
BH CV ECHO MEAS - MV P1/2T MAX VEL: 159 CM/SEC
BH CV ECHO MEAS - MV P1/2T: 66.5 MSEC
BH CV ECHO MEAS - MV V2 MEAN: 115 CM/SEC
BH CV ECHO MEAS - MV V2 VTI: 34.4 CM
BH CV ECHO MEAS - MVA P1/2T LCG: 1.4 CM^2
BH CV ECHO MEAS - MVA(P1/2T): 3.3 CM^2
BH CV ECHO MEAS - MVA(VTI): 1.9 CM^2
BH CV ECHO MEAS - PA ACC SLOPE: 187 CM/SEC^2
BH CV ECHO MEAS - PA ACC TIME: 0.06 SEC
BH CV ECHO MEAS - PA MAX PG (FULL): 4.1 MMHG
BH CV ECHO MEAS - PA MAX PG: 7.2 MMHG
BH CV ECHO MEAS - PA PR(ACCEL): 53.8 MMHG
BH CV ECHO MEAS - PA V2 MAX: 134 CM/SEC
BH CV ECHO MEAS - PI END-D VEL: 101 CM/SEC
BH CV ECHO MEAS - PVA(V,A): 1.9 CM^2
BH CV ECHO MEAS - PVA(V,D): 1.9 CM^2
BH CV ECHO MEAS - QP/QS: 0.75
BH CV ECHO MEAS - RAP SYSTOLE: 15 MMHG
BH CV ECHO MEAS - RV MAX PG: 3.1 MMHG
BH CV ECHO MEAS - RV MEAN PG: 1.5 MMHG
BH CV ECHO MEAS - RV V1 MAX: 87.6 CM/SEC
BH CV ECHO MEAS - RV V1 MEAN: 59 CM/SEC
BH CV ECHO MEAS - RV V1 VTI: 17.5 CM
BH CV ECHO MEAS - RVOT AREA: 2.8 CM^2
BH CV ECHO MEAS - RVOT DIAM: 1.9 CM
BH CV ECHO MEAS - RVSP: 78 MMHG
BH CV ECHO MEAS - SI(AO): 126.8 ML/M^2
BH CV ECHO MEAS - SI(CUBED): 39.3 ML/M^2
BH CV ECHO MEAS - SI(LVOT): 33.8 ML/M^2
BH CV ECHO MEAS - SI(MOD-SP2): 28.2 ML/M^2
BH CV ECHO MEAS - SI(MOD-SP4): 23.1 ML/M^2
BH CV ECHO MEAS - SI(TEICH): 30.5 ML/M^2
BH CV ECHO MEAS - SUP REN AO DIAM: 1.56 CM
BH CV ECHO MEAS - SV(AO): 247 ML
BH CV ECHO MEAS - SV(CUBED): 76.6 ML
BH CV ECHO MEAS - SV(LVOT): 65.9 ML
BH CV ECHO MEAS - SV(MOD-SP2): 55 ML
BH CV ECHO MEAS - SV(MOD-SP4): 45 ML
BH CV ECHO MEAS - SV(RVOT): 49.5 ML
BH CV ECHO MEAS - SV(TEICH): 59.5 ML
BH CV ECHO MEAS - TAPSE (>1.6): 1.6 CM2
BH CV ECHO MEAS - TR MAX VEL: 397 CM/SEC
BH CV XLRA - RV BASE: 3.3 CM
BH CV XLRA - TDI S': 10 CM/SEC
CYTO UR: NORMAL
DEPRECATED RDW RBC AUTO: 49.2 FL (ref 37–54)
E/E' RATIO: 22
EOSINOPHIL # BLD AUTO: 0.11 10*3/MM3 (ref 0–0.7)
EOSINOPHIL NFR BLD AUTO: 1.5 % (ref 0.3–6.2)
ERYTHROCYTE [DISTWIDTH] IN BLOOD BY AUTOMATED COUNT: 17.3 % (ref 11.5–14.5)
GLUCOSE BLDC GLUCOMTR-MCNC: 150 MG/DL (ref 70–130)
HCT VFR BLD AUTO: 31.6 % (ref 40.4–52.2)
HGB BLD-MCNC: 9.5 G/DL (ref 13.7–17.6)
IMM GRANULOCYTES # BLD: 0.02 10*3/MM3 (ref 0–0.03)
IMM GRANULOCYTES NFR BLD: 0.3 % (ref 0–0.5)
LAB AP CASE REPORT: NORMAL
LEFT ATRIUM VOLUME INDEX: 25 ML/M2
LV EF 2D ECHO EST: 36 %
LYMPHOCYTES # BLD AUTO: 1.35 10*3/MM3 (ref 0.9–4.8)
LYMPHOCYTES NFR BLD AUTO: 18.3 % (ref 19.6–45.3)
Lab: NORMAL
MCH RBC QN AUTO: 23.9 PG (ref 27–32.7)
MCHC RBC AUTO-ENTMCNC: 30.1 G/DL (ref 32.6–36.4)
MCV RBC AUTO: 79.4 FL (ref 79.8–96.2)
MONOCYTES # BLD AUTO: 0.64 10*3/MM3 (ref 0.2–1.2)
MONOCYTES NFR BLD AUTO: 8.7 % (ref 5–12)
NEUTROPHILS # BLD AUTO: 5.24 10*3/MM3 (ref 1.9–8.1)
NEUTROPHILS NFR BLD AUTO: 70.9 % (ref 42.7–76)
PATH REPORT.FINAL DX SPEC: NORMAL
PATH REPORT.GROSS SPEC: NORMAL
PLATELET # BLD AUTO: 196 10*3/MM3 (ref 140–500)
PMV BLD AUTO: 9.9 FL (ref 6–12)
RBC # BLD AUTO: 3.98 10*6/MM3 (ref 4.6–6)
WBC NRBC COR # BLD: 7.38 10*3/MM3 (ref 4.5–10.7)

## 2017-02-17 PROCEDURE — 93226 XTRNL ECG REC<48 HR SCAN A/R: CPT

## 2017-02-17 PROCEDURE — 82962 GLUCOSE BLOOD TEST: CPT

## 2017-02-17 PROCEDURE — 71010 HC CHEST PA OR AP: CPT

## 2017-02-17 PROCEDURE — 85025 COMPLETE CBC W/AUTO DIFF WBC: CPT | Performed by: NURSE PRACTITIONER

## 2017-02-17 PROCEDURE — 93306 TTE W/DOPPLER COMPLETE: CPT | Performed by: INTERNAL MEDICINE

## 2017-02-17 PROCEDURE — 93306 TTE W/DOPPLER COMPLETE: CPT

## 2017-02-17 PROCEDURE — 93225 XTRNL ECG REC<48 HRS REC: CPT

## 2017-02-17 PROCEDURE — 99231 SBSQ HOSP IP/OBS SF/LOW 25: CPT | Performed by: INTERNAL MEDICINE

## 2017-02-17 RX ORDER — LISINOPRIL 20 MG/1
10 TABLET ORAL DAILY
Qty: 30 TABLET | Refills: 0 | Status: SHIPPED | OUTPATIENT
Start: 2017-02-17 | End: 2017-01-01 | Stop reason: SDUPTHER

## 2017-02-17 RX ORDER — FERROUS SULFATE 325(65) MG
325 TABLET ORAL
Qty: 90 TABLET | Refills: 0 | Status: SHIPPED | OUTPATIENT
Start: 2017-02-17 | End: 2017-01-01

## 2017-02-17 RX ORDER — FUROSEMIDE 20 MG/1
20 TABLET ORAL DAILY
Qty: 90 TABLET | Refills: 1 | Status: SHIPPED | OUTPATIENT
Start: 2017-02-17 | End: 2017-01-01

## 2017-02-17 RX ORDER — FUROSEMIDE 20 MG/1
20 TABLET ORAL EVERY OTHER DAY
Qty: 90 TABLET | Refills: 1 | Status: SHIPPED | OUTPATIENT
Start: 2017-02-17 | End: 2017-02-17

## 2017-02-17 RX ORDER — PANTOPRAZOLE SODIUM 40 MG/1
40 TABLET, DELAYED RELEASE ORAL
Qty: 60 TABLET | Refills: 0 | Status: SHIPPED | OUTPATIENT
Start: 2017-02-17 | End: 2017-01-01 | Stop reason: SDUPTHER

## 2017-02-17 RX ADMIN — FERROUS SULFATE TAB 325 MG (65 MG ELEMENTAL FE) 325 MG: 325 (65 FE) TAB at 09:30

## 2017-02-17 RX ADMIN — ALLOPURINOL 100 MG: 100 TABLET ORAL at 09:30

## 2017-02-17 RX ADMIN — FERROUS SULFATE TAB 325 MG (65 MG ELEMENTAL FE) 325 MG: 325 (65 FE) TAB at 12:06

## 2017-02-17 RX ADMIN — FINASTERIDE 5 MG: 5 TABLET, FILM COATED ORAL at 09:30

## 2017-02-17 RX ADMIN — PETROLATUM: 42 OINTMENT TOPICAL at 09:31

## 2017-02-17 RX ADMIN — PANTOPRAZOLE SODIUM 40 MG: 40 TABLET, DELAYED RELEASE ORAL at 06:14

## 2017-02-19 PROCEDURE — 93227 XTRNL ECG REC<48 HR R&I: CPT | Performed by: INTERNAL MEDICINE

## 2017-02-23 NOTE — PROGRESS NOTES
Subjective   Imtiaz Jones is a 88 y.o. male.     Chief Complaint   Patient presents with   • following up on a fall 2/17/17   • stayed in hospital because of hemoglobin levels were low         History of Present Illness patient for follow-up after having loss of consciousness secondary to volume depletion with anemia transfused unit packed red blood cells consultation with GI and cardiology presently wearing a Holter monitor and thought to have some GI losses of blood taking ferrous sulfate 3 times a day feels fairly stable this point he is wearing nasal cannula O2 is no more symptoms in his not lightheaded and no recurrence of symptoms  Hospital records are reviewed  The following portions of the patient's history were reviewed and updated as appropriate: allergies, current medications, past family history, past medical history, past social history, past surgical history and problem list.    Review of Systems   Constitutional: Negative for activity change, appetite change and unexpected weight change.   HENT: Negative.  Negative for nosebleeds and trouble swallowing.    Eyes: Negative for pain and visual disturbance.   Respiratory: Negative for apnea, cough, choking, chest tightness, shortness of breath, wheezing and stridor.    Cardiovascular: Positive for leg swelling. Negative for chest pain and palpitations.   Gastrointestinal: Negative for abdominal pain, blood in stool, constipation and diarrhea.   Endocrine: Negative.    Genitourinary: Negative for difficulty urinating and hematuria.   Musculoskeletal: Negative for joint swelling.   Skin: Negative for color change and rash.   Allergic/Immunologic: Negative.    Neurological: Negative for syncope and speech difficulty.   Hematological: Negative for adenopathy.   Psychiatric/Behavioral: Negative for agitation and confusion.   All other systems reviewed and are negative.      Objective   Physical Exam   Constitutional: He is oriented to person, place, and  time. He appears well-developed and well-nourished.   HENT:   Head: Normocephalic.   Eyes: Conjunctivae are normal.   Neck: Normal range of motion. Neck supple. No JVD present. No thyromegaly present.   Cardiovascular: Normal rate and normal heart sounds.    Pulmonary/Chest: Effort normal and breath sounds normal.   Musculoskeletal: He exhibits no edema.   Neurological: He is alert and oriented to person, place, and time.   Skin: Skin is warm and dry.   Psychiatric: He has a normal mood and affect. His behavior is normal. Judgment and thought content normal.   Nursing note and vitals reviewed.      Assessment/Plan   Imtiaz was seen today for following up on a fall 2/17/17 and stayed in hospital because of hemoglobin levels were low.    Diagnoses and all orders for this visit:    Chronic systolic congestive heart failure    Chronic atrial fibrillation    Essential hypertension    Type 2 diabetes mellitus with hyperglycemia, without long-term current use of insulin    Chronic kidney disease, stage 3    Iron deficiency anemia due to chronic blood loss      Patient will continue iron replacement monitor blood sugars and weight goal is 170 his follow-up with GI and cardiology otherwise follow up in this office in two months

## 2017-02-28 PROBLEM — J96.01 ACUTE RESPIRATORY FAILURE WITH HYPOXIA (HCC): Status: ACTIVE | Noted: 2017-01-01

## 2017-02-28 PROBLEM — I50.42 CHRONIC COMBINED SYSTOLIC AND DIASTOLIC HEART FAILURE (HCC): Status: ACTIVE | Noted: 2017-02-17

## 2017-03-08 NOTE — TELEPHONE ENCOUNTER
Home health called and said pt swelling was worse than usual.  It was up to his knees.  His lungs were fine and no SOB, and O2 level was good.    Pt takes 20mg of lasix and no potassium.  What do we want to do.    Called Dr. Iqbal and told him the situation.  He had me look up his last BMP, based on that he said to just increase the lasix to 40mg qd.  I called the pt and informed him what dr iqbal said and to keep his F/u.

## 2017-03-14 NOTE — TELEPHONE ENCOUNTER
Pt returned my call per a staff message.     Called pt back. Advised that the path from his upper scope showed no H pylori, which is good. Advised that the polyps that were removed during the colonoscopy were not cancerous but they were considered potentially precancerous. Advised that he should be taking a PPI, a med for his stomach, and it looks like he had been prescribed pantoprazole previously. Verified that pt is still taking the medication. Advised that MD recommends to f/u as scheduled on 4/17/17. Pt verb understanding.

## 2017-03-17 PROBLEM — J90 PLEURAL EFFUSION, BILATERAL: Status: ACTIVE | Noted: 2017-01-01

## 2017-03-17 PROBLEM — N18.30 CHRONIC KIDNEY DISEASE, STAGE 3 (HCC): Status: RESOLVED | Noted: 2017-02-13 | Resolved: 2017-01-01

## 2017-03-17 NOTE — PROGRESS NOTES
Subjective   Imtiaz Jones is a 88 y.o. male.     Chief Complaint   Patient presents with   • following up on lab results   • following up on stomach acid     need refill on protonix       History of Present Illness   Is here for follow-up of CBC after taking iron because of anemia secondary to chronic blood loss he has also been using the pantoprazole and his run out of that medication is follow-up with GI next month he has no abdominal pain no weakness or low blood pressure he's feeling fairly stable at this point  He has chronic skin changes lower extremity is bilateral secondary to heart failure and bilateral pleural effusions he has recently increased his Lasix to pills daily.  He is on nasal cannula O2 since discharge from the hospital his in office O2 saturation on room air is 86% resting state   The following portions of the patient's history were reviewed and updated as appropriate: allergies, current medications, past family history, past medical history, past social history, past surgical history and problem list.    Review of Systems   Constitutional: Negative for activity change, appetite change and unexpected weight change.   HENT: Negative.  Negative for nosebleeds and trouble swallowing.    Eyes: Negative for pain and visual disturbance.   Respiratory: Positive for shortness of breath. Negative for apnea, cough, choking, chest tightness, wheezing and stridor.    Cardiovascular: Positive for leg swelling. Negative for chest pain and palpitations.   Gastrointestinal: Negative for abdominal pain, blood in stool, constipation and diarrhea.   Endocrine: Negative.    Genitourinary: Negative for difficulty urinating and hematuria.   Musculoskeletal: Negative for joint swelling.   Skin: Negative for color change and rash.   Allergic/Immunologic: Negative.    Neurological: Negative for syncope and speech difficulty.   Hematological: Negative for adenopathy.   Psychiatric/Behavioral: Negative for agitation  and confusion.   All other systems reviewed and are negative.      Objective   Physical Exam   Constitutional: He is oriented to person, place, and time. He appears well-developed and well-nourished.   HENT:   Head: Normocephalic.   Eyes: Conjunctivae are normal.   Neck: Normal range of motion. Neck supple. No JVD present. No thyromegaly present.   Cardiovascular: Normal rate and normal heart sounds.    Pulmonary/Chest: Effort normal and breath sounds normal.   Musculoskeletal: He exhibits no edema.   Neurological: He is alert and oriented to person, place, and time.   Skin: Skin is warm and dry.   Chronic skin and venous changes lower extremities bilaterally   Psychiatric: He has a normal mood and affect. His behavior is normal. Judgment and thought content normal.   Nursing note and vitals reviewed.   O2 - 86% on room air    Assessment/Plan   Imtiaz was seen today for following up on lab results and following up on stomach acid.    Diagnoses and all orders for this visit:    Iron deficiency anemia due to chronic blood loss  -     CBC & Differential    Hyperglycemia    Type 2 diabetes mellitus with hyperglycemia, without long-term current use of insulin    Pleural effusion, bilateral    Chronic combined systolic and diastolic heart failure    Essential hypertension    Other orders  -     pantoprazole (PROTONIX) 40 MG EC tablet; Take 1 tablet by mouth 2 (Two) Times a Day Before Meals.  -     Cancel: CBC & Differential     patient has appointment with cardiology next week and follow-up with GI in 1 month  Reduce Lasix to once daily

## 2017-03-20 PROBLEM — I27.20 PULMONARY HTN (HCC): Status: ACTIVE | Noted: 2017-01-01

## 2017-03-20 PROBLEM — I50.42 CHRONIC COMBINED SYSTOLIC AND DIASTOLIC HEART FAILURE (HCC): Chronic | Status: ACTIVE | Noted: 2017-02-17

## 2017-03-20 PROBLEM — I42.9 CARDIOMYOPATHY (HCC): Chronic | Status: ACTIVE | Noted: 2017-01-01

## 2017-03-20 NOTE — PROGRESS NOTES
Subjective:     Encounter Date: 3/20/2017      Patient ID: Imtiaz Jones is a 88 y.o. male.    Chief Complaint:  History of Present Illness    Dear Dr. Restrepo,    I had the pleasure of seeing this patient back in the office today for follow-up of his cardiac status.  It has been about a year since he was last in our office, although we saw him when he was hospitalized at [facility] about a month ago.  At that point he came in with dizziness and fall and was found to be profoundly anemic with a GI bleed.  He had an EGD and colonoscopy that showed gastritis and colonic polyps.  He also had severe recurring hematuria and for both these reasons he was told that he could not take any anticoagulant indefinitely.  He has had previous problems with hematuria secondary to bladder cancer and has been off of aspirin because of recurring hematuria on aspirin; Dr. Rogel has told him in the past that he cannot take aspirin because of hematuria.  He was seen by cardiology while hospitalized.  He had an echocardiogram that showed an ejection fraction of 36% with severe pulmonary hypertension.  Afterload reduction and continued diuresis was recommended.  He also had grade 2 diastolic dysfunction.    He has a history of CAD and is status post CABG on 2 separate occasions.  [facility] but until we saw him in September he had not seen a cardiologist in 12 years.  In September brief for a Lexiscan Cardiolite that showed an ejection fraction of 46% and no evidence of ischemia.  He did have a bundle branch block contraction pattern at the time.    Today he states he feels much better.  He's having no chest pain, pressure, tightness, squeezing, or heartburn.  No nausea, vomiting, diaphoresis.  No lower extremity edema.  He has no shortness breath at rest or with activity.  He's not had any recurrent GI bleeding or hematuria that he is aware of.  He just had follow-up blood  work on Friday; this showed a hemoglobin was about the same as it had been at discharge-it is not dropped again but also has not gone higher to any significant extent despite the substantial iron supplementation that he had been on at 1 tablet 3 times daily.    The following portions of the patient's history were reviewed and updated as appropriate: allergies, current medications, past family history, past medical history, past social history, past surgical history and problem list.    Past Medical History   Diagnosis Date   • Acute arthritis    • Acute renal insufficiency    • Anemia    • Angiodysplasia      of colon   • Atherosclerosis of native coronary artery of native heart without angina pectoris    • Atopic dermatitis    • BPH (benign prostatic hyperplasia)    • Carotid artery disease      HAD ABBEY ENDARTERECTOMY   • CKD (chronic kidney disease)    • Coronary artery disease    • Diabetes mellitus, type II      TYPE 2   • Edema    • Elbow pain    • Gout    • Hematuria      HISTORY REASON WHY SURGERY WAS CANCELED 2 WEEKS AGO   • Hyperlipidemia    • Hypertension      OFF MEDICATION FOR NOW.    • Joint effusion of elbow    • Joint effusion of elbow    • Lightheadedness    • Malignant neoplasm of bladder      ALSO CANCER ON LT LEG   • Near syncope    • Nephrolithiasis    • Neuralgia    • Psychogenic dysuria    • Renal insufficiency syndrome    • Skin avulsion    • Urinary retention    • UTI (urinary tract infection)        Past Surgical History   Procedure Laterality Date   • Gallbladder surgery     • Coronary artery bypass graft  1980'S & 2003     2 different surgeries BOTH SURGERY 3 VESSELS DONE   • Colonoscopy       Complete   • Cataract extraction Bilateral    • Carotid endarterectomy Bilateral    • Pr drain lower leg deep absc/hematoma Left 7/14/2016     Procedure: LOWER EXTREMITY DEBRIDEMENT, EXCISION OF LT LEG SKIN TUMOR WITH FULL THICKNESS SKIN GRAFT RECONSTRUCTION;  Surgeon: Molina Jackson MD;   Location: [facility] MAIN OR;  Service: Plastics   • Colonoscopy N/A 2/15/2017     Procedure: COLONOSCOPY to cecum and TI with cold and hot snare polypectomies;  Surgeon: Jae Rosado MD;  Location: [facility] ENDOSCOPY;  Service:    • Endoscopy N/A 2/15/2017     Procedure: ESOPHAGOGASTRODUODENOSCOPY with biopsies;  Surgeon: Jae Rosado MD;  Location: [facility] ENDOSCOPY;  Service:        Social History     Social History   • Marital status:      Spouse name: N/A   • Number of children: 4   • Years of education: N/A     Occupational History   • retired      Social History Main Topics   • Smoking status: Former Smoker     Packs/day: 0.50     Years: 30.00     Types: Cigarettes, Pipe, Cigars   • Smokeless tobacco: Never Used      Comment: 25 YR AGO   • Alcohol use No      Comment: caffeine use   • Drug use: No   • Sexual activity: Not on file     Other Topics Concern   • Not on file     Social History Narrative       Review of Systems   Constitution: Negative for chills, decreased appetite and night sweats.   HENT: Negative for ear discharge, ear pain, hearing loss, nosebleeds and sore throat.    Eyes: Negative for blurred vision, double vision and pain.   Cardiovascular: Negative for cyanosis.   Respiratory: Negative for hemoptysis and sputum production.    Endocrine: Negative for cold intolerance and heat intolerance.   Hematologic/Lymphatic: Negative for adenopathy.   Skin: Negative for dry skin, itching, nail changes, rash and suspicious lesions.   Musculoskeletal: Negative for arthritis, gout, muscle cramps, muscle weakness, myalgias and neck pain.   Gastrointestinal: Negative for anorexia, constipation, diarrhea, dysphagia, hematemesis and jaundice.   Genitourinary: Negative for dysuria, flank pain, frequency, hematuria and nocturia.   Neurological: Negative for numbness, paresthesias and seizures.   Psychiatric/Behavioral: Negative for altered mental status, hallucinations, hypervigilance, suicidal ideas and  "thoughts of violence.   Allergic/Immunologic: Negative for persistent infections.         ECG 12 Lead  Date/Time: 3/20/2017 1:04 PM  Performed by: SHARON JACKSON III.  Authorized by: SHARON JACKSON III   Comparison: compared with previous ECG   Similar to previous ECG  Rhythm: sinus rhythm  Rate: normal  Conduction: conduction normal  Conduction: 1st degree  ST Segments: ST segments normal  T flattening: I, aVL, V5 and V6  QRS axis: normal  Other: no other findings  Clinical impression: abnormal ECG               Objective:     Vitals:    03/20/17 1143   BP: 117/68   Pulse: 79   Weight: 174 lb (78.9 kg)   Height: 70\" (177.8 cm)         Physical Exam   Constitutional: He is oriented to person, place, and time. He appears well-developed and well-nourished. No distress.   HENT:   Head: Normocephalic and atraumatic.   Nose: Nose normal.   Mouth/Throat: Oropharynx is clear and moist.   Eyes: Conjunctivae and EOM are normal. Pupils are equal, round, and reactive to light. Right eye exhibits no discharge. Left eye exhibits no discharge.   Neck: Normal range of motion. Neck supple. No tracheal deviation present. No thyromegaly present.   Cardiovascular: Normal rate, regular rhythm, S1 normal, S2 normal, normal heart sounds and normal pulses.  Exam reveals no S3.    Pulmonary/Chest: Effort normal and breath sounds normal. No stridor. No respiratory distress. He exhibits no tenderness.   Abdominal: Soft. Bowel sounds are normal. He exhibits no distension and no mass. There is no tenderness. There is no rebound and no guarding.   Musculoskeletal: Normal range of motion. He exhibits no tenderness or deformity.   Lymphadenopathy:     He has no cervical adenopathy.   Neurological: He is alert and oriented to person, place, and time. He has normal reflexes.   Skin: Skin is warm and dry. No rash noted. He is not diaphoretic. No erythema.   Psychiatric: He has a normal mood and affect. Thought content normal.       Lab Review: "     Results from last 7 days  Lab Units 03/17/17  1303   SODIUM mmol/L 145   POTASSIUM mmol/L 5.1   CHLORIDE mmol/L 105   TOTAL CO2, ARTERIAL mmol/L 27.3   BUN mg/dL 23   CREATININE mg/dL 1.28*   CALCIUM mg/dL 9.7               Assessment:          Diagnosis Plan   1. Atherosclerosis of native coronary artery of native heart without angina pectoris     2. Essential hypertension     3. Paroxysmal atrial fibrillation     4. Chronic combined systolic and diastolic heart failure            Plan:       Overall this patient is doing well with no symptoms from a cardiac standpoint.  We will continue his current medical therapy.    Thank you very much for allowing us to participate in the care of this pleasant patient.  Please don't hesitate to call if I can be of assistance in any way.      Current Outpatient Prescriptions:   •  allopurinol (ZYLOPRIM) 100 MG tablet, Take 1 tablet by mouth Daily., Disp: 90 tablet, Rfl: 0  •  ferrous sulfate 325 (65 FE) MG tablet, Take 1 tablet by mouth 3 (Three) Times a Day With Meals. (Patient taking differently: Take 325 mg by mouth 2 (Two) Times a Day.), Disp: 90 tablet, Rfl: 0  •  finasteride (PROSCAR) 5 MG tablet, Take 1 tablet by mouth daily., Disp: , Rfl:   •  furosemide (LASIX) 10 MG/ML solution, Take 10 mg by mouth Daily., Disp: , Rfl:   •  lisinopril (PRINIVIL,ZESTRIL) 20 MG tablet, Take 0.5 tablets by mouth Daily., Disp: 30 tablet, Rfl: 0  •  pantoprazole (PROTONIX) 40 MG EC tablet, Take 1 tablet by mouth 2 (Two) Times a Day Before Meals., Disp: 60 tablet, Rfl: 1  •  simvastatin (ZOCOR) 80 MG tablet, TAKE 1 TABLET DAILY IN THE EVENING, Disp: 90 tablet, Rfl: 2  •  tamsulosin (FLOMAX) 0.4 MG capsule, Take 1 capsule by mouth every night., Disp: , Rfl:

## 2017-03-21 NOTE — TELEPHONE ENCOUNTER
----- Message from Charles Rose MD sent at 3/20/2017  4:25 PM EDT -----  Labs could continue same medications follow-up office appointment 3 months

## 2017-03-21 NOTE — TELEPHONE ENCOUNTER
Patient notified.  Patient wanted to know if he needs to continue using his oxygen at all times or can he try using it at night only.  Please advise.

## 2017-04-08 PROBLEM — R31.0 GROSS HEMATURIA: Status: ACTIVE | Noted: 2017-01-01

## 2017-04-08 NOTE — PLAN OF CARE
Problem: Patient Care Overview (Adult)  Goal: Plan of Care Review  Outcome: Ongoing (interventions implemented as appropriate)    04/08/17 7206   Coping/Psychosocial Response Interventions   Plan Of Care Reviewed With patient   Patient Care Overview   Progress no change   Outcome Evaluation   Outcome Summary/Follow up Plan Pt came into the ER this am because he was unable to void and felt pressure. Chung was placed, urine is pink tinged. Plan is to keep chung in for now and follow up in the morning. Vital signs stable no complaints of pain.         Problem: Fall Risk (Adult)  Goal: Identify Related Risk Factors and Signs and Symptoms  Outcome: Ongoing (interventions implemented as appropriate)  Goal: Absence of Falls  Outcome: Ongoing (interventions implemented as appropriate)    Problem: Urine Elimination, Impaired (Adult)  Goal: Identify Related Risk Factors and Signs and Symptoms  Outcome: Ongoing (interventions implemented as appropriate)  Goal: Effective Urinary Elimination  Outcome: Ongoing (interventions implemented as appropriate)  Goal: Effective Containment of Urine  Outcome: Ongoing (interventions implemented as appropriate)

## 2017-04-08 NOTE — ED PROVIDER NOTES
EMERGENCY DEPARTMENT ENCOUNTER    CHIEF COMPLAINT  Chief Complaint: difficulty urinating  History given by: patient  History limited by: none  Room Number: 27/27  PMD: Charles Rose MD   Urologist: Dr. Rogel      HPI:  Pt is a 88 y.o. male who presents complaining of difficulty urinating since this morning. He had no difficulty urinating last night, but did notice some mild hematuria which is not typical for him. He has only urinated a minmal amount this morning. He has some mild suprapubic abd pain secondary to the retention, but denies any nausea, vomiting, or blood in stool. Chronic BLE swelling. Pt has a hx of bladder cancer and Dr. Rogel is his urologist. He has had catheters placed in the past for similar issues.    Duration:  A few hours  Onset: gradual  Timing: constant  Quality: retaining  Intensity/Severity: moderate  Progression: unchanged  Associated Symptoms: mild abd pain and hematuria  Aggravating Factors: none stated  Alleviating Factors: none  Previous Episodes: hx of bladder cancer, similar retention  Treatment before arrival: none stated    PAST MEDICAL HISTORY  Active Ambulatory Problems     Diagnosis Date Noted   • Calculus of kidney 03/22/2016   • Hypertension 03/22/2016   • Hyperlipidemia 03/22/2016   • Gout 03/22/2016   • Type 2 diabetes mellitus with hyperglycemia 03/22/2016   • Atherosclerosis of coronary artery 03/22/2016   • Malignant neoplasm of urinary bladder 03/30/2016   • Hyperglycemia 04/03/2012   • Microscopic hematuria 02/13/2017   • Iron deficiency anemia due to chronic blood loss 02/13/2017   • BPH (benign prostatic hyperplasia) 02/13/2017   • Acute posthemorrhagic anemia 02/13/2017   • Colon polyps 02/16/2017   • Diverticulosis 02/16/2017   • Angiodysplasia of colon 02/16/2017   • Atrial fibrillation 02/16/2017   • 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   • Acute respiratory failure with hypoxia 02/28/2017   • Pleural effusion,  bilateral 03/17/2017   • Pulmonary HTN 03/20/2017   • Cardiomyopathy 03/20/2017     Resolved Ambulatory Problems     Diagnosis Date Noted   • Effusion of elbow 03/22/2016   • Acute renal impairment 03/22/2016   • Near syncope 02/12/2017   • Chronic kidney disease, stage 3 02/13/2017     Past Medical History:   Diagnosis Date   • Acute arthritis    • Acute renal insufficiency    • Anemia    • Angiodysplasia    • Atherosclerosis of native coronary artery of native heart without angina pectoris    • Atopic dermatitis    • BPH (benign prostatic hyperplasia)    • Cardiomyopathy 3/20/2017   • Carotid artery disease    • CKD (chronic kidney disease)    • Coronary artery disease    • Diabetes mellitus, type II    • Edema    • Elbow pain    • Gout    • Hematuria    • Hyperlipidemia    • Hypertension    • Joint effusion of elbow    • Joint effusion of elbow    • Lightheadedness    • Malignant neoplasm of bladder    • Near syncope    • Nephrolithiasis    • Neuralgia    • Psychogenic dysuria    • Pulmonary HTN 3/20/2017   • Renal insufficiency syndrome    • Skin avulsion    • Urinary retention    • UTI (urinary tract infection)        PAST SURGICAL HISTORY  Past Surgical History:   Procedure Laterality Date   • CAROTID ENDARTERECTOMY Bilateral    • CATARACT EXTRACTION Bilateral    • COLONOSCOPY      Complete   • COLONOSCOPY N/A 2/15/2017    Procedure: COLONOSCOPY to cecum and TI with cold and hot snare polypectomies;  Surgeon: Jae Rosado MD;  Location: [facility] ENDOSCOPY;  Service:    • CORONARY ARTERY BYPASS GRAFT  1980'S & 2003    2 different surgeries BOTH SURGERY 3 VESSELS DONE   • ENDOSCOPY N/A 2/15/2017    Procedure: ESOPHAGOGASTRODUODENOSCOPY with biopsies;  Surgeon: Jae Rosado MD;  Location: [facility] ENDOSCOPY;  Service:    • GALLBLADDER SURGERY     • PA DRAIN LOWER LEG DEEP ABSC/HEMATOMA Left 7/14/2016    Procedure: LOWER EXTREMITY DEBRIDEMENT, EXCISION OF LT LEG SKIN TUMOR WITH FULL THICKNESS SKIN GRAFT  RECONSTRUCTION;  Surgeon: Molina Jackson MD;  Location: [facility] OR;  Service: Plastics       FAMILY HISTORY  Family History   Problem Relation Age of Onset   • Heart failure Mother      congestive   • Lung cancer Father    • Diabetes Brother      mellitus       SOCIAL HISTORY  Social History     Social History   • Marital status:      Spouse name: N/A   • Number of children: 4   • Years of education: N/A     Occupational History   • retired      Social History Main Topics   • Smoking status: Former Smoker     Packs/day: 0.50     Years: 30.00     Types: Cigarettes, Pipe, Cigars   • Smokeless tobacco: Never Used      Comment: 25 YR AGO   • Alcohol use No      Comment: caffeine use   • Drug use: No   • Sexual activity: Not on file     Other Topics Concern   • Not on file     Social History Narrative       ALLERGIES  Iodine and Keflex [cephalexin]    REVIEW OF SYSTEMS  Review of Systems   Constitutional: Negative.  Negative for chills and fever.   HENT: Negative.    Respiratory: Negative.  Negative for cough and shortness of breath.    Cardiovascular: Positive for leg swelling (chronic). Negative for chest pain.   Gastrointestinal: Positive for abdominal pain (mild). Negative for blood in stool, diarrhea and vomiting.   Genitourinary: Positive for difficulty urinating and hematuria.   Musculoskeletal: Negative.    Skin: Negative.    Neurological: Negative for dizziness and headaches.   Psychiatric/Behavioral: Negative.    All other systems reviewed and are negative.      PHYSICAL EXAM  ED Triage Vitals   Temp Heart Rate Resp BP SpO2   04/08/17 0913 04/08/17 0913 04/08/17 0913 -- 04/08/17 0913   97.4 °F (36.3 °C) 108 16  97 %      Temp src Heart Rate Source Patient Position BP Location FiO2 (%)   04/08/17 0913 -- -- -- --   Tympanic           Physical Exam   Constitutional: He is oriented to person, place, and time and well-developed, well-nourished, and in no distress.   HENT:   Head: Normocephalic and  atraumatic.   Eyes: EOM are normal. Pupils are equal, round, and reactive to light.   Neck: Normal range of motion. Neck supple.   Cardiovascular: Normal rate, regular rhythm and normal heart sounds.    Pulmonary/Chest: Effort normal and breath sounds normal. No respiratory distress.   Abdominal: Soft. There is tenderness (mild suprapubic). There is no rebound and no guarding.   Genitourinary:   Genitourinary Comments: Bladder scan shows >500 cc's   Musculoskeletal: Normal range of motion. He exhibits no edema.   Chronic stasis changes   Neurological: He is alert and oriented to person, place, and time. He has normal sensation and normal strength.   Skin: Skin is warm and dry.   Psychiatric: Mood and affect normal.   Nursing note and vitals reviewed.      LAB RESULTS  Lab Results (last 24 hours)     Procedure Component Value Units Date/Time    Urinalysis With / Culture If Indicated [[id number]]  (Abnormal) Collected:  04/08/17 0957    Specimen:  Urine from Urine, Catheter Updated:  04/08/17 1024     Color, UA Red (A)      Any Substance that causes an abnormal urine color can alter the accuracy of the chemical reactions.        Appearance, UA Cloudy (A)     pH, UA 6.0     Specific Gravity, UA <=1.005     Glucose, UA Negative     Ketones, UA 15 mg/dL (1+) (A)     Bilirubin, UA Negative     Blood, UA Large (3+) (A)     Protein,  mg/dL (2+) (A)     Leuk Esterase, UA Moderate (2+) (A)     Nitrite, UA Negative     Urobilinogen, UA 0.2 E.U./dL    Urinalysis, Microscopic Only [[id number]]  (Abnormal) Collected:  04/08/17 0957    Specimen:  Urine from Urine, Catheter Updated:  04/08/17 1025     RBC, UA Too Numerous to Count (A) /HPF      WBC, UA  /HPF      Unable to determine due to loaded field (A)     Bacteria, UA  /HPF      Unable to determine due to loaded field (A)     Squamous Epithelial Cells, UA  /HPF      Unable to determine due to loaded field (A)     Hyaline Casts, UA  /LPF      Unable to determine due to loaded  field     Methodology Manual Light Microscopy    Urine Culture [[id number]] Collected:  04/08/17 0957    Specimen:  Urine from Urine, Catheter Updated:  04/08/17 1008    Basic Metabolic Panel [[id number]]  (Abnormal) Collected:  04/08/17 1120    Specimen:  Blood Updated:  04/08/17 1151     Glucose 110 (H) mg/dL      BUN 28 (H) mg/dL      Creatinine 1.33 (H) mg/dL      Sodium 142 mmol/L      Potassium 4.5 mmol/L      Chloride 102 mmol/L      CO2 25.7 mmol/L      Calcium 9.7 mg/dL      eGFR Non African Amer 51 (L) mL/min/1.73      BUN/Creatinine Ratio 21.1     Anion Gap 14.3 mmol/L     Narrative:       The MDRD GFR formula is only valid for adults with stable renal function between ages 18 and 70.    Protime-INR [[id number]]  (Abnormal) Collected:  04/08/17 1120    Specimen:  Blood Updated:  04/08/17 1142     Protime 14.7 (H) Seconds      INR 1.19 (H)    CBC & Differential [[id number]] Collected:  04/08/17 1132    Specimen:  Blood Updated:  04/08/17 1148    Narrative:       The following orders were created for panel order CBC & Differential.  Procedure                               Abnormality         Status                     ---------                               -----------         ------                     CBC Auto Differential[[id number]]         Abnormal            Final result                 Please view results for these tests on the individual orders.    CBC Auto Differential [[id number]]  (Abnormal) Collected:  04/08/17 1132    Specimen:  Blood Updated:  04/08/17 1148     WBC 6.60 10*3/mm3      RBC 3.63 (L) 10*6/mm3      Hemoglobin 9.1 (L) g/dL      Hematocrit 29.4 (L) %      MCV 81.0 fL      MCH 25.1 (L) pg      MCHC 31.0 (L) g/dL      RDW 23.5 (H) %      RDW-SD 69.1 (H) fl      MPV 9.9 fL      Platelets 201 10*3/mm3      Neutrophil % 71.6 %      Lymphocyte % 21.1 %      Monocyte % 6.2 %      Eosinophil % 0.9 %      Basophil % 0.2 %      Immature Grans % 0.0 %      Neutrophils, Absolute 4.73 10*3/mm3       Lymphocytes, Absolute 1.39 10*3/mm3      Monocytes, Absolute 0.41 10*3/mm3      Eosinophils, Absolute 0.06 10*3/mm3      Basophils, Absolute 0.01 10*3/mm3      Immature Grans, Absolute 0.00 10*3/mm3         I ordered the above labs and reviewed the results      RADIOLOGY  No orders to display        PROCEDURES  Procedures      PROGRESS AND CONSULTS  ED Course   Comment By Time   9:40 AM  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  Bladder scan >500.  Will place catheter and check urine.  Pt looks great and no other new complaints. Layton Sanches MD 04/08 0941     9:36 AM: Bladder scan >500 mls, will place chung. UA ordered due to hematuria.    10:29 AM: Pt rechecked, chung is in place with light pink colored urine. No significant clotting. Likely pt has a UTI, will consult urology, likely d/c with abx. Reviewed CBC results from a month ago and Hgb at that time was 9.8, appeared to be baseline.    10:55 AM: Call w/ Dr. Blackman (urology) who recommends admission based on lab results and urine appearance. He defers admission to medicine.    10:59 AM: Pt rechecked, he continues to rest comfortably. Informed him of Dr. Blackman's recommendation, and he agrees with the plan for the admission as necessary. Pt and family understand and agree with the plan. All questions answered. Will place call to [facility].    12:05 PM: Latest Hgb 9.1, down from 9.8 about a month ago.    1:03 PM: Call w/ Dr. Vazquez who agrees to admit the pt.      MEDICAL DECISION MAKING  Results were reviewed/discussed with the patient and they were also made aware of online access. Pt also made aware that some labs, such as cultures, will not be resulted during ER visit and follow up with PMD is necessary.     MDM  Number of Diagnoses or Management Options     Amount and/or Complexity of Data Reviewed  Clinical lab tests: ordered and reviewed (UA shows gross hematuria, too numerous to count RBCs)  Decide to obtain previous  medical records or to obtain history from someone other than the patient: yes  Review and summarize past medical records: yes (Pt was admitted 2/12/17 after a fall with anemia and hematuria.)  Discuss the patient with other providers: yes    Patient Progress  Patient progress: stable         DIAGNOSIS  Final diagnoses:   Urinary retention   Malignant neoplasm of urinary bladder, unspecified site   Gross hematuria       DISPOSITION  ADMISSION: Patient admitted by Dr. Vazquez to telemetry.    Discussed treatment plan and reason for admission with pt/family and admitting physician.  Pt/family voiced understanding of the plan for admission for further testing/treatment as needed.     Latest Documented Vital Signs:  As of 12:07 PM  BP- 166/64 HR- 89 Temp- 97.4 °F (36.3 °C) (Tympanic) O2 sat- 96%    --  Documentation assistance provided by carmen Parrish for Dr. Sanches.  Information recorded by the scribe was done at my direction and has been verified and validated by me.     Toby Parrish  04/08/17 8431       Layton Sanches MD  04/08/17 8875

## 2017-04-08 NOTE — H&P
"    Name: Imtiaz Jones ADMIT: 2017   : 1928  PCP: Charles Rose MD    MRN: [id number] LOS: 0 days   AGE/SEX: 88 y.o. male  ROOM:      Chief Complaint   Patient presents with   • Difficulty Urinating     \"I have cancer of the bladder.  I was peeing alright last night, but not so much this morning.\"       Subjective   Patient is a 88 y.o. male presents with the following...    History of Present Illness    Pt is a 88 y.o. male who presents complaining of difficulty urinating since this morning. He had no difficulty urinating last night, but did notice some mild hematuria which is not atypical for him. He has only urinated a minmal amount this morning. He has some mild suprapubic abd pain secondary to the retention, but denies any nausea, vomiting, or blood in stool. Chronic BLE swelling. Pt has a hx of bladder cancer and Dr. Rogel is his urologist. He has had catheters placed in the past for similar issues.  Was here in  for same.  Off anticoagulants due to bleeding issues.     Duration:  A few hours  Onset: gradual  Timing: constant  Quality: retaining  Intensity/Severity: moderate  Progression: unchanged  Associated Symptoms: mild abd pain and hematuria  Aggravating Factors: none stated  Alleviating Factors: none  Previous Episodes: hx of bladder cancer, similar retention  Treatment before arrival: none stated    Past Medical History:   Diagnosis Date   • Acute arthritis    • Acute renal insufficiency    • Anemia    • Angiodysplasia     of colon   • Atherosclerosis of native coronary artery of native heart without angina pectoris    • Atopic dermatitis    • BPH (benign prostatic hyperplasia)    • Cardiomyopathy 3/20/2017   • Carotid artery disease     HAD ABBEY ENDARTERECTOMY   • CKD (chronic kidney disease)    • Coronary artery disease    • Diabetes mellitus, type II     TYPE 2   • Edema    • Elbow pain    • Gout    • Hematuria     HISTORY REASON WHY SURGERY WAS CANCELED 2 WEEKS " AGO   • Hyperlipidemia    • Hypertension     OFF MEDICATION FOR NOW.    • Joint effusion of elbow    • Joint effusion of elbow    • Lightheadedness    • Malignant neoplasm of bladder     ALSO CANCER ON LT LEG   • Near syncope    • Nephrolithiasis    • Neuralgia    • Psychogenic dysuria    • Pulmonary HTN 3/20/2017   • Renal insufficiency syndrome    • Skin avulsion    • Urinary retention    • UTI (urinary tract infection)      Past Surgical History:   Procedure Laterality Date   • CAROTID ENDARTERECTOMY Bilateral    • CATARACT EXTRACTION Bilateral    • COLONOSCOPY      Complete   • COLONOSCOPY N/A 2/15/2017    Procedure: COLONOSCOPY to cecum and TI with cold and hot snare polypectomies;  Surgeon: Jae Rosado MD;  Location: [facility] ENDOSCOPY;  Service:    • CORONARY ARTERY BYPASS GRAFT  1980'S & 2003    2 different surgeries BOTH SURGERY 3 VESSELS DONE   • ENDOSCOPY N/A 2/15/2017    Procedure: ESOPHAGOGASTRODUODENOSCOPY with biopsies;  Surgeon: Jae Rosado MD;  Location: [facility] ENDOSCOPY;  Service:    • GALLBLADDER SURGERY     • NE DRAIN LOWER LEG DEEP ABSC/HEMATOMA Left 7/14/2016    Procedure: LOWER EXTREMITY DEBRIDEMENT, EXCISION OF LT LEG SKIN TUMOR WITH FULL THICKNESS SKIN GRAFT RECONSTRUCTION;  Surgeon: Molina Jackson MD;  Location: [facility] MAIN OR;  Service: Plastics     Family History   Problem Relation Age of Onset   • Heart failure Mother      congestive   • Lung cancer Father    • Diabetes Brother      mellitus     Social History   Substance Use Topics   • Smoking status: Former Smoker     Packs/day: 0.50     Years: 30.00     Types: Cigarettes, Pipe, Cigars   • Smokeless tobacco: Never Used      Comment: 25 YR AGO   • Alcohol use No      Comment: caffeine use       (Not in a hospital admission)  Allergies:  Iodine and Keflex [cephalexin]    Review of Systems   Constitutional: Negative.    HENT: Negative.    Eyes: Negative.    Respiratory: Negative.    Gastrointestinal: Negative.    Endocrine:  Negative.    Genitourinary: Negative.    Musculoskeletal: Negative.    Skin: Negative.    Neurological: Negative.    Hematological: Negative.    Psychiatric/Behavioral: Negative.         Objective    Vital Signs  Temp:  [97.4 °F (36.3 °C)] 97.4 °F (36.3 °C)  Heart Rate:  [] 92  Resp:  [16] 16  BP: (164-187)/(64-90) 168/73  SpO2:  [92 %-97 %] 96 %  on   ;   O2 Device: room air  Body mass index is 24.68 kg/(m^2).    Physical Exam   Constitutional: He is oriented to person, place, and time. He appears well-developed and well-nourished.   HENT:   Head: Normocephalic and atraumatic.   Eyes: Conjunctivae and EOM are normal. Pupils are equal, round, and reactive to light. No scleral icterus.   Neck: Normal range of motion. Neck supple. No JVD present.   Cardiovascular: Normal rate, regular rhythm and normal heart sounds.    No murmur heard.  Pulmonary/Chest: Effort normal and breath sounds normal. No respiratory distress.   Abdominal: Soft. Bowel sounds are normal. He exhibits no distension. There is no tenderness.   Genitourinary:   Genitourinary Comments: FC in place, urine grossly bloody   Musculoskeletal: Normal range of motion. He exhibits edema.   Neurological: He is alert and oriented to person, place, and time. No cranial nerve deficit.   Skin: Skin is warm and dry.   bilat CVS changes LEs.  LLE dry, scaley skin.  No cellulitis.   Psychiatric: He has a normal mood and affect. His behavior is normal. Judgment and thought content normal.   Vitals reviewed.      Results Review:   I reviewed the patient's new clinical results.    Assessment/Plan     Principal Problem:    Gross hematuria  Active Problems:    Hypertension    Type 2 diabetes mellitus with hyperglycemia    Atherosclerosis of coronary artery    Malignant neoplasm of urinary bladder    Chronic kidney disease, stage 3    Atrial fibrillation    Chronic combined systolic and diastolic heart failure    Pulmonary HTN    Cardiomyopathy      Assessment &  Plan  Urology evaluated the patient in emergency department.  Defer treatment of hematuria to them.  Flores catheter in place.  Monitor hemoglobin.  Continue home medications.  Monitor blood sugar.  Correctional factor insulin ordered.      I discussed the patients findings and my recommendations with patient.          Krishan Vazquez MD  [facility]  04/08/17  1:04 PM

## 2017-04-08 NOTE — CONSULTS
[facility]  Eddy Lozada MD    Patient Care Team:  Charles Rose MD as PCP - General (Family Medicine)  Vel Rogel MD as PCP - Claims Attributed - PLEASE DO NOT REMOVE  Sathish Burden MD as MSSP ACO Attributed - PLEASE DO NOT REMOVE    Chief complaint 1 urinary retention  #2 gross hematuria    Subjective .     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  He opted to remain conservative.  Evidently his last bladder surgery was in October 2015.    He was doing very well at home until last night when his urine became a little pink but is not having trouble.  This morning he could not urinate at all.  He came to the emergency room where a catheter was placed relieving him of retention.  His urine was bloody from then on.  He fell markedly better after having his catheter placed.      past medical history:  1 presumed possible invasive carcinoma the bladder last resected in October 2015  #2 history of cholecystectomy in 1997  #3 open heart surgery 1987  #4 TUR bladder tumor 2014  #5 TUR bladder tumor in 2015  #6 diabetes  #7 hypertension      Review of Systems  All systems were reviewed and were negative except for urinary retention, gross hematuria   History  Past Medical History:   Diagnosis Date   • Acute arthritis    • Acute renal insufficiency    • Anemia    • Angiodysplasia     of colon   • Atherosclerosis of native coronary artery of native heart without angina pectoris    • Atopic dermatitis    • BPH (benign prostatic hyperplasia)    • Cardiomyopathy 3/20/2017   • Carotid artery disease     HAD ABBEY ENDARTERECTOMY   • CKD (chronic kidney disease)    • Coronary artery disease    • Diabetes mellitus, type II     TYPE 2   • Edema    • Elbow pain    • Gout    • Hematuria     HISTORY REASON WHY SURGERY WAS CANCELED 2 WEEKS AGO   • Hyperlipidemia    • Hypertension     OFF MEDICATION FOR NOW.    • Joint effusion of elbow     • Joint effusion of elbow    • Lightheadedness    • Malignant neoplasm of bladder     ALSO CANCER ON LT LEG   • Near syncope    • Nephrolithiasis    • Neuralgia    • Psychogenic dysuria    • Pulmonary HTN 3/20/2017   • Renal insufficiency syndrome    • Skin avulsion    • Urinary retention    • UTI (urinary tract infection)    ,   Past Surgical History:   Procedure Laterality Date   • CAROTID ENDARTERECTOMY Bilateral    • CATARACT EXTRACTION Bilateral    • COLONOSCOPY      Complete   • COLONOSCOPY N/A 2/15/2017    Procedure: COLONOSCOPY to cecum and TI with cold and hot snare polypectomies;  Surgeon: Jae Rosado MD;  Location: [facility] ENDOSCOPY;  Service:    • CORONARY ARTERY BYPASS GRAFT  1980'S & 2003    2 different surgeries BOTH SURGERY 3 VESSELS DONE   • ENDOSCOPY N/A 2/15/2017    Procedure: ESOPHAGOGASTRODUODENOSCOPY with biopsies;  Surgeon: Jae Rosado MD;  Location: [facility] ENDOSCOPY;  Service:    • GALLBLADDER SURGERY     • AL DRAIN LOWER LEG DEEP ABSC/HEMATOMA Left 7/14/2016    Procedure: LOWER EXTREMITY DEBRIDEMENT, EXCISION OF LT LEG SKIN TUMOR WITH FULL THICKNESS SKIN GRAFT RECONSTRUCTION;  Surgeon: Molina Jackson MD;  Location: [facility] MAIN OR;  Service: Plastics   ,   Family History   Problem Relation Age of Onset   • Heart failure Mother      congestive   • Lung cancer Father    • Diabetes Brother      mellitus   ,   Social History   Substance Use Topics   • Smoking status: Former Smoker     Packs/day: 0.50     Years: 30.00     Types: Cigarettes, Pipe, Cigars   • Smokeless tobacco: Never Used      Comment: 25 YR AGO   • Alcohol use No      Comment: caffeine use   ,   Prescriptions Prior to Admission   Medication Sig Dispense Refill Last Dose   • allopurinol (ZYLOPRIM) 100 MG tablet Take 1 tablet by mouth Daily. 90 tablet 0 Taking   • ferrous sulfate 325 (65 FE) MG tablet Take 1 tablet by mouth 3 (Three) Times a Day With Meals. (Patient taking differently: Take 325 mg by mouth 2 (Two)  Times a Day.) 90 tablet 0 Taking   • finasteride (PROSCAR) 5 MG tablet Take 1 tablet by mouth daily.   Taking   • furosemide (LASIX) 10 MG/ML solution Take 10 mg by mouth Daily.   Taking   • lisinopril (PRINIVIL,ZESTRIL) 20 MG tablet Take 0.5 tablets by mouth Daily. 30 tablet 0 Taking   • pantoprazole (PROTONIX) 40 MG EC tablet Take 1 tablet by mouth 2 (Two) Times a Day Before Meals. 60 tablet 1 Taking   • simvastatin (ZOCOR) 80 MG tablet TAKE 1 TABLET DAILY IN THE EVENING 90 tablet 2 Taking   • tamsulosin (FLOMAX) 0.4 MG capsule Take 1 capsule by mouth every night.   Taking   , Scheduled Meds:   , Continuous Infusions:   , PRN Meds:  •  Insert peripheral IV **AND** sodium chloride, Allergies:  Iodine and Keflex [cephalexin] and     Objective     Vital Signs   Temp:  [97.4 °F (36.3 °C)-98.1 °F (36.7 °C)] 98.1 °F (36.7 °C)  Heart Rate:  [] 84  Resp:  [16-18] 18  BP: (130-187)/(56-90) 130/62    Physical Exam:     General Appearance:    Alert, cooperative, in no acute distress   Head:    Normocephalic, without obvious abnormality, atraumatic   Eyes:            Lids and lashes normal, conjunctivae and sclerae normal, no   icterus, no pallor, corneas clear, PERRLA   Ears:    Ears appear intact with no abnormalities noted   Throat:   No oral lesions, no thrush, oral mucosa moist   Neck:   No adenopathy, supple, trachea midline,    Back:     No kyphosis present, no scoliosis present, no skin lesions,      erythema or scars, no tenderness to percussion or                   palpation,   range of motion normal   Lungs:     Clear to auscultation,respirations regular, even and                  unlabored    Heart:    Regular rhythm and normal rate, normal S1 and S2, no            murmur, no gallop, no rub, no click   Chest Wall:    No abnormalities observed   Abdomen:     Normal bowel sounds, no masses, no organomegaly, soft        non-tender, non-distended, no guarding, no rebound                tenderness    Genital/Rectal:     catheter in place.  Urine output blood-tinged.   Extremities:   Moves all extremities well, no edema, no cyanosis, no             redness       Skin:   No bleeding, bruising or rash       Neurologic:   Cranial nerves 2 - 12 grossly intact, sensation intact,       Results Review:   I reviewed the patient's new clinical results.      Assessment/Plan     Principal Problem:    Gross hematuria  Active Problems:    Hypertension    Type 2 diabetes mellitus with hyperglycemia    Atherosclerosis of coronary artery    Malignant neoplasm of urinary bladder    Chronic kidney disease, stage 3    Atrial fibrillation    Chronic combined systolic and diastolic heart failure    Pulmonary HTN    Cardiomyopathy          I discussed the patients findings and my recommendations with patient, family, nursing staff and We'll continue Flores catheterization.  If densest area we can swap out his catheter for a three-way irrigating catheter.  He will need to stay in the hospital until his urine clears.    Eddy Lozada MD  04/08/17  3:06 PM    Time: 40

## 2017-04-09 NOTE — PROGRESS NOTES
"DAILY PROGRESS NOTE    Patient Identification:  Name: Imtiaz Jones  Age: 88 y.o.  Sex: male  :  1928  MRN: [id number]               Subjective:  CC: bladder cancer, gross hematuria, urinary retention. Pt well known to me w presumed muscle invasive bladder ca, has deferred options for surgery/chemo  And as of 2017 had opted fpor palliative measures only for his bladder ca. Adm last night w retention and gross hematuria.    Objective:    Scheduled Meds:  allopurinol 100 mg Oral Daily   aminocaproic acid (AMICAR) infusion 2 g/hr Intravenous Q4H   atorvastatin 40 mg Oral Daily   ferrous sulfate 325 mg Oral BID   finasteride 5 mg Oral Daily   furosemide 20 mg Intravenous Once   insulin aspart 0-9 Units Subcutaneous 4x Daily With Meals & Nightly   lisinopril 10 mg Oral Daily   pantoprazole 40 mg Oral BID AC   tamsulosin 0.4 mg Oral Nightly     Continuous Infusions:  sodium chloride 3,000 mL     PRN Meds:•  acetaminophen  •  dextrose  •  dextrose  •  glucagon (human recombinant)  •  lidocaine  •  Morphine  •  nitroglycerin  •  ondansetron    Vital signs in last 24 hours:  Temp:  [97.6 °F (36.4 °C)-98.8 °F (37.1 °C)] 97.8 °F (36.6 °C)  Heart Rate:  [63-92] 70  Resp:  [16-18] 16  BP: ()/(46-73) 113/46    Intake/Output:    Intake/Output Summary (Last 24 hours) at 17 1036  Last data filed at 17 0840   Gross per 24 hour   Intake                0 ml   Output            14867 ml   Net           -32644 ml       Exam:  /46 (BP Location: Left arm, Patient Position: Lying)  Pulse 70  Temp 97.8 °F (36.6 °C) (Oral)   Resp 16  Ht 70\" (177.8 cm)  Wt 172 lb (78 kg)  SpO2 94%  BMI 24.68 kg/m2    General Appearance:    Alert, cooperative, no distress, appears stated age   Back:     Symmetric, no CVA tenderness   Lungs:     Clear to auscultation bilaterally, respirations unlabored   Heart:    Regular rate and rhythm, strong peripheral pulses   Abdomen:    sofr, nontender   Genitalia:   3way " chung in draining clear   Extremities:   Extremities normal, atraumatic, no cyanosis or edema   Skin:   Skin color, texture, turgor normal, no rashes or lesions        Data Review:  All labs (24hrs):   Recent Results (from the past 24 hour(s))   Basic Metabolic Panel    Collection Time: 04/08/17 11:20 AM   Result Value Ref Range    Glucose 110 (H) 65 - 99 mg/dL    BUN 28 (H) 8 - 23 mg/dL    Creatinine 1.33 (H) 0.76 - 1.27 mg/dL    Sodium 142 136 - 145 mmol/L    Potassium 4.5 3.5 - 5.2 mmol/L    Chloride 102 98 - 107 mmol/L    CO2 25.7 22.0 - 29.0 mmol/L    Calcium 9.7 8.6 - 10.5 mg/dL    eGFR Non African Amer 51 (L) >60 mL/min/1.73    BUN/Creatinine Ratio 21.1 7.0 - 25.0    Anion Gap 14.3 mmol/L   Protime-INR    Collection Time: 04/08/17 11:20 AM   Result Value Ref Range    Protime 14.7 (H) 11.7 - 14.2 Seconds    INR 1.19 (H) 0.90 - 1.10   Type & Screen    Collection Time: 04/08/17 11:32 AM   Result Value Ref Range    ABO Type A     RH type Negative     Antibody Screen Negative    CBC Auto Differential    Collection Time: 04/08/17 11:32 AM   Result Value Ref Range    WBC 6.60 4.50 - 10.70 10*3/mm3    RBC 3.63 (L) 4.60 - 6.00 10*6/mm3    Hemoglobin 9.1 (L) 13.7 - 17.6 g/dL    Hematocrit 29.4 (L) 40.4 - 52.2 %    MCV 81.0 79.8 - 96.2 fL    MCH 25.1 (L) 27.0 - 32.7 pg    MCHC 31.0 (L) 32.6 - 36.4 g/dL    RDW 23.5 (H) 11.5 - 14.5 %    RDW-SD 69.1 (H) 37.0 - 54.0 fl    MPV 9.9 6.0 - 12.0 fL    Platelets 201 140 - 500 10*3/mm3    Neutrophil % 71.6 42.7 - 76.0 %    Lymphocyte % 21.1 19.6 - 45.3 %    Monocyte % 6.2 5.0 - 12.0 %    Eosinophil % 0.9 0.3 - 6.2 %    Basophil % 0.2 0.0 - 1.5 %    Immature Grans % 0.0 0.0 - 0.5 %    Neutrophils, Absolute 4.73 1.90 - 8.10 10*3/mm3    Lymphocytes, Absolute 1.39 0.90 - 4.80 10*3/mm3    Monocytes, Absolute 0.41 0.20 - 1.20 10*3/mm3    Eosinophils, Absolute 0.06 0.00 - 0.70 10*3/mm3    Basophils, Absolute 0.01 0.00 - 0.20 10*3/mm3    Immature Grans, Absolute 0.00 0.00 - 0.03 10*3/mm3    Antibody Identification    Collection Time: 04/08/17 11:32 AM   Result Value Ref Range    Anti-D ANTI-D    Prepare RBC, 2 Units    Collection Time: 04/08/17  1:13 PM   Result Value Ref Range    Product Code C2850C09     Unit Number [id number]     UNIT  ABO A     UNIT  RH NEG     CROSSMATCH 1 INTERPRETATION Compatible     Dispense Status XM     Blood Type ANEG     Blood Expiration Date 201705122359     Blood Type Barcode 0600     Product Code O6998Y70     Unit Number [id number]     UNIT  ABO A     UNIT  RH NEG     CROSSMATCH 1 INTERPRETATION Compatible     Dispense Status XM     Blood Type ANEG     Blood Expiration Date 201705122359     Blood Type Barcode 0600    POC Glucose Fingerstick    Collection Time: 04/08/17  4:38 PM   Result Value Ref Range    Glucose 104 70 - 130 mg/dL   POC Glucose Fingerstick    Collection Time: 04/08/17  8:58 PM   Result Value Ref Range    Glucose 132 (H) 70 - 130 mg/dL   Basic Metabolic Panel    Collection Time: 04/09/17  6:33 AM   Result Value Ref Range    Glucose 122 (H) 65 - 99 mg/dL    BUN 27 (H) 8 - 23 mg/dL    Creatinine 1.35 (H) 0.76 - 1.27 mg/dL    Sodium 143 136 - 145 mmol/L    Potassium 4.2 3.5 - 5.2 mmol/L    Chloride 108 (H) 98 - 107 mmol/L    CO2 24.6 22.0 - 29.0 mmol/L    Calcium 8.7 8.6 - 10.5 mg/dL    eGFR Non African Amer 50 (L) >60 mL/min/1.73    BUN/Creatinine Ratio 20.0 7.0 - 25.0    Anion Gap 10.4 mmol/L   CBC (No Diff)    Collection Time: 04/09/17  6:33 AM   Result Value Ref Range    WBC 6.36 4.50 - 10.70 10*3/mm3    RBC 2.94 (L) 4.60 - 6.00 10*6/mm3    Hemoglobin 7.4 (L) 13.7 - 17.6 g/dL    Hematocrit 24.6 (L) 40.4 - 52.2 %    MCV 83.7 79.8 - 96.2 fL    MCH 25.2 (L) 27.0 - 32.7 pg    MCHC 30.1 (L) 32.6 - 36.4 g/dL    RDW 23.7 (H) 11.5 - 14.5 %    RDW-SD 71.2 (H) 37.0 - 54.0 fl    MPV 9.9 6.0 - 12.0 fL    Platelets 175 140 - 500 10*3/mm3   Hemoglobin A1c    Collection Time: 04/09/17  6:33 AM   Result Value Ref Range    Hemoglobin A1C 5.45 4.80 - 5.60 %    POC Glucose Fingerstick    Collection Time: 04/09/17  7:50 AM   Result Value Ref Range    Glucose 125 70 - 130 mg/dL    [unfilled]     Assessment:  Principal Problem:    Gross hematuria  Active Problems:    Hypertension    Type 2 diabetes mellitus with hyperglycemia    Atherosclerosis of coronary artery    Malignant neoplasm of urinary bladder    Chronic kidney disease, stage 3    Acute posthemorrhagic anemia    Atrial fibrillation    Chronic combined systolic and diastolic heart failure    Pulmonary HTN    Cardiomyopathy  Bladder cancer likely locally advanced now w hematuria and retention. Hematuria better. Long discussion w pt . Will make limited DNR no vent, no cardiac rescussitation. Pt bleeding and he wants to try xrt for palliation which may help with his bleeding but will come at a cost as far as comfort is concerned. Will ask rad onc to weigh in. CT ordered today     Plan:  Review ct, consult xrt, continue cbi, amicar may be helping, DNR, possible cysto tomorrow pm to try and control bleeding.     Vel Rogel MD  4/9/2017  10:36 AM

## 2017-04-09 NOTE — SIGNIFICANT NOTE
04/09/17 0924   Rehab Treatment   Discipline physical therapist   Treatment Not Performed unable to treat, medical status change   Rehab Evaluation   Evaluation Not Performed other (see comments);patient/family decline, not feeling well  (patient not feeling well; getting terp/ hgb 7.4 nursing ok refusal to follow up tomorrow)   Recommendation   PT - Next Appointment 04/10/17

## 2017-04-09 NOTE — PLAN OF CARE
Problem: Patient Care Overview (Adult)  Goal: Plan of Care Review  Outcome: Ongoing (interventions implemented as appropriate)    04/09/17 0527   Coping/Psychosocial Response Interventions   Plan Of Care Reviewed With patient   Patient Care Overview   Progress progress toward functional goals as expected   Outcome Evaluation   Outcome Summary/Follow up Plan Pt. urine became more bloody with clots, urologist contacted, pt. is now on continuous irrigation,(TURP) experienced pain after insertion of chung, given prn pain meds, up to [facility] for BM, urine is now pink tinged, irrigation continued.         Problem: Fall Risk (Adult)  Goal: Identify Related Risk Factors and Signs and Symptoms  Outcome: Ongoing (interventions implemented as appropriate)    04/09/17 0527   Fall Risk   Fall Risk: Related Risk Factors age-related changes;bladder function altered   Fall Risk: Signs and Symptoms presence of risk factors       Goal: Absence of Falls  Outcome: Ongoing (interventions implemented as appropriate)    04/09/17 0527   Fall Risk (Adult)   Absence of Falls making progress toward outcome         Problem: Urine Elimination, Impaired (Adult)  Goal: Identify Related Risk Factors and Signs and Symptoms  Outcome: Ongoing (interventions implemented as appropriate)    04/09/17 0527   Urine Elimination, Impaired   Urine Elimination, Impaired: Related Risk Factors aging effects;disease process;urological disorder   Signs and Symptoms (Urine Elimination Impaired) acute pain (abdomen, pelvis, back)       Goal: Effective Urinary Elimination  Outcome: Ongoing (interventions implemented as appropriate)    04/09/17 0527   Urine Elimination, Impaired (Adult)   Effective Urinary Elimination making progress toward outcome       Goal: Effective Containment of Urine  Outcome: Ongoing (interventions implemented as appropriate)    04/09/17 0527   Urine Elimination, Impaired (Adult)   Effective Containment of Urine making progress toward outcome        Goal: Reduced Incontinence Episodes  Outcome: Ongoing (interventions implemented as appropriate)    04/09/17 0527   Urine Elimination, Impaired (Adult)   Reduced Incontinence Episodes making progress toward outcome

## 2017-04-09 NOTE — PROGRESS NOTES
"    Name: Imtiaz Jones ADMIT: 2017   : 1928  PCP: Charles Rose MD    MRN: [id number] LOS: 1 days   AGE/SEX: 88 y.o. male  ROOM: [facility]/     Subjective   Denies any chest pain, SOA, nausea, vomiting or diarrhea.      Objective   Vital Signs  Temp:  [97.6 °F (36.4 °C)-98.8 °F (37.1 °C)] 97.6 °F (36.4 °C)  Heart Rate:  [63-98] 63  Resp:  [16-18] 16  BP: ()/(52-90) 94/52  SpO2:  [92 %-97 %] 92 %  on  Flow (L/min):  [1] 1;   O2 Device: nasal cannula  Body mass index is 24.68 kg/(m^2).    Objective:  General Appearance:  Comfortable and in no acute distress.    Vital signs: (most recent): Blood pressure 94/52, pulse 63, temperature 97.6 °F (36.4 °C), temperature source Oral, resp. rate 16, height 70\" (177.8 cm), weight 172 lb (78 kg), SpO2 92 %.    Output: Producing urine (Flores in place.  Strawberry colored urine.).    Lungs:  Normal effort.  Breath sounds clear to auscultation.    Heart: Normal rate.  Regular rhythm.    Abdomen: Abdomen is soft and non-distended.  Bowel sounds are normal.   There is no abdominal tenderness.     Extremities: There is no dependent edema.    Neurological: Patient is alert and oriented to person, place and time.    Skin:  Warm and dry.        Results Review:       I reviewed the patient's new clinical results.    Results from last 7 days  Lab Units 17  0633 17  1132   WBC 10*3/mm3 6.36 6.60   HEMOGLOBIN g/dL 7.4* 9.1*   PLATELETS 10*3/mm3 175 201       Results from last 7 days  Lab Units 17  0633 17  1120   SODIUM mmol/L 143 142   POTASSIUM mmol/L 4.2 4.5   CHLORIDE mmol/L 108* 102   TOTAL CO2 mmol/L 24.6 25.7   BUN mg/dL 27* 28*   CREATININE mg/dL 1.35* 1.33*   GLUCOSE mg/dL 122* 110*   Estimated Creatinine Clearance: 39.1 mL/min (by C-G formula based on Cr of 1.35).    Results from last 7 days  Lab Units 17  0633 17  1120   CALCIUM mg/dL 8.7 9.7         allopurinol 100 mg Oral Daily   aminocaproic acid (AMICAR) infusion 2 g/hr " Intravenous Q4H   atorvastatin 40 mg Oral Daily   ferrous sulfate 325 mg Oral BID   finasteride 5 mg Oral Daily   insulin aspart 0-9 Units Subcutaneous 4x Daily With Meals & Nightly   lisinopril 10 mg Oral Daily   pantoprazole 40 mg Oral BID AC   tamsulosin 0.4 mg Oral Nightly       sodium chloride 3,000 mL    sodium chloride 50 mL/hr Last Rate: 50 mL/hr (04/09/17 0125)   NPO Diet      Assessment/Plan   Assessment:     Active Hospital Problems (** Indicates Principal Problem)    Diagnosis Date Noted   • **Gross hematuria [R31.0] 04/08/2017   • Pulmonary HTN [I27.2] 03/20/2017   • Cardiomyopathy [I42.9] 03/20/2017   • Chronic combined systolic and diastolic heart failure [I50.42] 02/17/2017   • Atrial fibrillation [I48.91] 02/16/2017   • Chronic kidney disease, stage 3 [N18.3] 02/13/2017   • Acute posthemorrhagic anemia [D62] 02/13/2017   • Malignant neoplasm of urinary bladder [C67.9] 03/30/2016   • Type 2 diabetes mellitus with hyperglycemia [E11.65] 03/22/2016   • Hypertension [I10] 03/22/2016   • Atherosclerosis of coronary artery [I25.10] 03/22/2016      Resolved Hospital Problems    Diagnosis Date Noted Date Resolved   No resolved problems to display.       Plan:   - Will transfuse 2 units packed red blood cells.  IV Lasix between units.  Will stop IV fluids.  - Urology following for gross hematuria.   Likely due to bladder cancer.  Discussed with urology was playing CT scan to further evaluate and possible cystoscopy.  - Per patient wishes we will make DNR.        Krishan Vazquez MD  [facility]  04/09/17  9:21 AM

## 2017-04-09 NOTE — CONSULTS
DIAGNOSIS and REASON FOR CONSULTATION: No matching staging information was found for the patient.    Referring Provider:  Krishan Vazquez MD    HISTORY OF PRESENT ILLNESS:  The patient is a 88 y.o. year old male who was first diagnosed with a 3mm  papillary tumor of the bladder neck in early 2015.  He completed several courses of BCG and underwent a TURBT with Dr. Vel Rogel on 10/21/15.  There was no evidence of muscle invasion at that time.  He underwent repeat TURBT on 3/30/16 at [facility], and evidently there was a probability of invasion at that procedure.  The patient declined at that time any definitive surgery or chemotherapy/ radiation in treatment of his bladder cancer and preferred to just follow conservatively.  He's had several episodes of retention and hematuria and is admitted at this hospitalization for these issues at this time.  The possibility of a short course of palliative radiation therapy to help with his bleeding was brought up and he would like to be evaluated for same.  I understand a CT scan of the abdomen and pelvis is scheduled for today I believe and cystoscopy may also be planned.  We are asked to go over with him the details of palliative XRT in this setting.  I described a short course to 30 Gy in 10 fractions and discussed the treatment details as well as side effects.  He understands and agrees to proceed.                   Past Medical History: he  has a past medical history of Acute arthritis; Acute renal insufficiency; Anemia; Angiodysplasia; Atherosclerosis of native coronary artery of native heart without angina pectoris; Atopic dermatitis; BPH (benign prostatic hyperplasia); Cardiomyopathy (3/20/2017); Carotid artery disease; CKD (chronic kidney disease); Coronary artery disease; Diabetes mellitus, type II; Edema; Elbow pain; Gout; Hematuria; Hyperlipidemia; Hypertension; Joint effusion of elbow; Joint effusion of elbow; Lightheadedness; Malignant neoplasm of  bladder; Near syncope; Nephrolithiasis; Neuralgia; Psychogenic dysuria; Pulmonary HTN (3/20/2017); Renal insufficiency syndrome; Skin avulsion; Urinary retention; and UTI (urinary tract infection).    Past Surgical History:  he has a past surgical history that includes Gallbladder surgery; Coronary artery bypass graft (1980'S & 2003); Colonoscopy; Cataract extraction (Bilateral); Carotid endarterectomy (Bilateral); drain lower leg deep absc/hematoma (Left, 7/14/2016); Colonoscopy (N/A, 2/15/2017); and Esophagogastroduodenoscopy (N/A, 2/15/2017).    Meds:    Current Facility-Administered Medications:   •  acetaminophen (TYLENOL) tablet 650 mg, 650 mg, Oral, Q6H PRN, Krishan Vazquez MD  •  allopurinol (ZYLOPRIM) tablet 100 mg, 100 mg, Oral, Daily, Krishan Vazquez MD, 100 mg at 04/09/17 0851  •  aminocaproic acid (AMICAR) 5 g in sodium chloride 0.9 % 250 mL infusion, 2 g/hr, Intravenous, Q4H, Eddy Lozada MD, 2 g/hr at 04/09/17 0613  •  atorvastatin (LIPITOR) tablet 40 mg, 40 mg, Oral, Daily, Krishan Vazquez MD, 40 mg at 04/09/17 0851  •  dextrose (D50W) solution 25 g, 25 g, Intravenous, Q15 Min PRN, Krishan Vazquez MD  •  dextrose (GLUTOSE) oral gel 15 g, 15 g, Oral, Q15 Min PRN, Krishan Vazquez MD  •  ferrous sulfate tablet 325 mg, 325 mg, Oral, BID, Krishan Vazquez MD, 325 mg at 04/09/17 0851  •  finasteride (PROSCAR) tablet 5 mg, 5 mg, Oral, Daily, Krishan Vazquez MD, 5 mg at 04/09/17 0851  •  furosemide (LASIX) injection 20 mg, 20 mg, Intravenous, Once, Krishan Vazquez MD  •  glucagon (human recombinant) (GLUCAGEN DIAGNOSTIC) injection 1 mg, 1 mg, Subcutaneous, Q15 Min PRN, Krishan Vazquez MD  •  insulin aspart (novoLOG) injection 0-9 Units, 0-9 Units, Subcutaneous, 4x Daily With Meals & Nightly, Krishan Vazquez MD  •  lidocaine (UROJET) jelly, , Urethral, PRN, Eddy Lozada MD  •  lisinopril (PRINIVIL,ZESTRIL) tablet 10 mg, 10 mg, Oral, Daily, Krishan Vazquez MD  •  morphine injection 2 mg, 2 mg, Intravenous, Q2H PRN,  Jamie Chen MD, 2 mg at 04/09/17 0511  •  nitroglycerin (NITROSTAT) SL tablet 0.4 mg, 0.4 mg, Sublingual, Q5 Min PRN, Krishan Vazquez MD  •  ondansetron (ZOFRAN) injection 4 mg, 4 mg, Intravenous, Q6H PRN, Krishan Vazquez MD  •  pantoprazole (PROTONIX) EC tablet 40 mg, 40 mg, Oral, BID AC, Krishan Vazquez MD, 40 mg at 04/09/17 0851  •  sodium chloride (NS) irrigation solution 3,000 mL, 3,000 mL, Irrigation, Continuous, Eddy Lozada MD, 3,000 mL at 04/09/17 1057  •  tamsulosin (FLOMAX) 24 hr capsule 0.4 mg, 0.4 mg, Oral, Nightly, Krishan Vazquez MD, 0.4 mg at 04/08/17 2111    Allergies:    Allergies   Allergen Reactions   • Iodine Hives   • Keflex [Cephalexin] Hives       Family History:  his family history includes Diabetes in his brother; Heart failure in his mother; Lung cancer in his father.    Social History:  he  reports that he has quit smoking. His smoking use included Cigarettes, Pipe, and Cigars. He has a 15.00 pack-year smoking history. He has never used smokeless tobacco. He reports that he does not drink alcohol or use illicit drugs.    Pertinent Findings on   Review of Systems - Oncology:  , reports some abdominal discomfort.    Vitals:    04/09/17 0000 04/09/17 0849 04/09/17 1033 04/09/17 1105   BP: 106/56 94/52 113/46 106/55   BP Location: Left arm Left arm Left arm    Patient Position: Lying Lying Lying    Pulse: 75 63 70 64   Resp: 16 16 16 16   Temp: 98.6 °F (37 °C) 97.6 °F (36.4 °C) 97.8 °F (36.6 °C) 98.1 °F (36.7 °C)   TempSrc: Oral Oral Oral Oral   SpO2: 93% 92% 94% 94%   Weight:       Height:           Performance Status: (2) Ambulatory and capable of self care, unable to carry out work activity, up and about > 50% or waking hours    Pertinent Findings on  Physical Exam:  Supine in bed, awake alert spontaneous in no apparent distress Flores catheter in place     Assessment:   1. Gross hematuria    2. Urinary retention    3. Malignant neoplasm of urinary bladder, unspecified site       Problem  List Items Addressed This Visit        Genitourinary    Malignant neoplasm of urinary bladder    * (Principal)Gross hematuria - Primary      Other Visit Diagnoses     Urinary retention           [unfilled] No matching staging information was found for the patient.    Plan:  30 Gy in 10 fractions to the bladder for hematuria.  We will bring him over tomorrow morning, Monday, April 10 for CT simulation and will try to start the first treatment tomorrow afternoon or at the very latest Tuesday morning.        I spent over  25   minutes on the unit and of that time, 25   minutes was spent counseling and coordinating care.

## 2017-04-10 NOTE — OP NOTE
Operative Note      Imtiaz Jones  88 y.o.  12/28/1928  male  [id number]      4/8/2017 - 4/10/2017    Surgeon(s) and Role:     * Vel Rogel MD - Primary     Pre-operative Diagnosis: locally advanced bladder cancer    Post-operative Diagnosis: Same    Complications:None    Description of procedure: cysto clot evac    Specimens:    Estimated Blood Loss: minimal    Vel Rogel MD  4/10/2017  4:54 PM

## 2017-04-10 NOTE — SIGNIFICANT NOTE
04/10/17 0916   Rehab Treatment   Discipline physical therapist   Rehab Evaluation   Evaluation Not Performed other (see comments)  (CODY [place] states pt is getting ready to go to CT then will go to OR later today for bladder cancer resection. RN agreed that PT should check back tomorrow.)   Recommendation   PT - Next Appointment 04/11/17

## 2017-04-10 NOTE — ANESTHESIA PREPROCEDURE EVALUATION
Anesthesia Evaluation        Airway   Mallampati: II  no difficulty expected  Dental    (+) upper dentures and lower dentures    Pulmonary    Cardiovascular     ECG reviewed  Rhythm: regular  Rate: normal    (+) CABG,       Neuro/Psych  GI/Hepatic/Renal/Endo      Musculoskeletal     Abdominal    Substance History      OB/GYN          Other                                  Anesthesia Plan    ASA 3     general   (Full dentures are in  )  intravenous induction   Anesthetic plan and risks discussed with patient.

## 2017-04-10 NOTE — PROGRESS NOTES
Discharge Planning Assessment  [facility]     Patient Name: Imtiaz Jones  MRN: [id number]  Today's Date: 4/10/2017    Admit Date: 4/8/2017          Discharge Needs Assessment       04/10/17 0949    Living Environment    Lives With spouse    Living Arrangements house    Provides Primary Care For no one    Quality Of Family Relationships supportive    Able to Return to Prior Living Arrangements yes    Discharge Needs Assessment    Concerns To Be Addressed denies needs/concerns at this time    Readmission Within The Last 30 Days no previous admission in last 30 days    Anticipated Changes Related to Illness none    Equipment Currently Used at Home oxygen   has o2 from [place], but he only uses prn @ [facility]    Transportation Available car;family or friend will provide            Discharge Plan       04/10/17 0950    Case Management/Social Work Plan    Plan return home with wife.  [facility] following    Patient/Family In Agreement With Plan yes    Additional Comments IMM letter signed.  Facesheet verified.  Spoke with patient in room.  Introduced self and explained role.   Patient lives in a ss house with his wife, Natali ( [phone]).  There is a basement, but the patient has a stair lift. Patient IADLS prior to admit.  Only DME is home o2 from [place] that he uses prn at [facility].  He has used [facility] in the past and is agreeable to having them  follow again if needed.  Referral called to [facility] and DAVID will follow.  He does not have a SNU history.  Plans for cysto today and possibly start XRT today or tomorrow.  CCP will follow.         Discharge Placement     Facility/Agency Request Status Selected? Address Phone Number Fax Number    [facility] Pending - No Request Sent     [address] [address] [phone]                Demographic Summary       04/10/17 0951    Referral Information    Admission Type inpatient    Arrived From admitted as an inpatient     Referral Source admission list    Reason For Consult discharge planning    Primary Care Physician Information    Name Dr Charles Rose            Functional Status       04/10/17 0949    Functional Status Current    Ambulation 3-->assistive equipment and person    Transferring 3-->assistive equipment and person    Toileting 3-->assistive equipment and person    Bathing 2-->assistive person    Dressing 2-->assistive person    Eating 0-->independent    Communication 0-->understands/communicates without difficulty    Swallowing (if score 2 or more for any item, consult Rehab Services) 0-->swallows foods/liquids without difficulty    Change in Functional Status Since Onset of Current Illness/Injury no    Functional Status Prior    Ambulation 0-->independent    Transferring 0-->independent    Toileting 0-->independent    Bathing 0-->independent    Dressing 0-->independent    Eating 0-->independent    Swallowing 0-->swallows foods/liquids without difficulty    Cognitive/Perceptual/Developmental    Current Mental Status/Cognitive Functioning no deficits noted    Recent Changes in Mental Status/Cognitive Functioning no changes            Psychosocial     None            Abuse/Neglect     None            Legal     None            Substance Abuse     None            Patient Forms     None          Chelsey Pruitt, RN

## 2017-04-10 NOTE — ANESTHESIA PROCEDURE NOTES
Airway  Urgency: elective    Date/Time: 4/10/2017 4:07 PM  Airway not difficult    General Information and Staff    Patient location during procedure: OR  Anesthesiologist: DOC BLANCHARD  CRNA: ALBINO RAMSAY    Indications and Patient Condition  Indications for airway management: airway protection    Preoxygenated: yes  MILS not maintained throughout  Mask difficulty assessment: 0 - not attempted    Final Airway Details  Final airway type: supraglottic airway      Successful airway: classic  Size 5    Number of attempts at approach: 1    Additional Comments  Atraumatic

## 2017-04-10 NOTE — ANESTHESIA POSTPROCEDURE EVALUATION
Patient: Imtiaz Jones    Procedure Summary     Date Anesthesia Start Anesthesia Stop Room / Location    04/10/17 1601 1646  [facility] / [facility]       Procedure Diagnosis Surgeon Provider    CYSTOSCOPY WITH CLOT EVACUATION (N/A ) No diagnosis on file. MD Wallace Rangel MD          Anesthesia Type: general  Last vitals  /56 (04/10/17 1720)    Temp 36.4 °C (97.6 °F) (04/10/17 1642)    Pulse 81 (04/10/17 1720)   Resp 16 (04/10/17 1720)    SpO2 94 % (04/10/17 1720)      Post Anesthesia Care and Evaluation    Patient location during evaluation: PACU  Patient participation: complete - patient participated  Level of consciousness: awake and alert  Pain management: adequate  Airway patency: patent  Anesthetic complications: No anesthetic complications    Cardiovascular status: acceptable  Respiratory status: acceptable  Hydration status: acceptable

## 2017-04-10 NOTE — PROGRESS NOTES
"    Name: Imtiaz Jones ADMIT: 2017   : 1928  PCP: Charles Rose MD    MRN: [id number] LOS: 2 days   AGE/SEX: 88 y.o. male  ROOM: [facility]     Subjective   Denies any chest pain, SOA, nausea, vomiting or diarrhea.      Objective   Vital Signs  Temp:  [97.4 °F (36.3 °C)-98.6 °F (37 °C)] 97.4 °F (36.3 °C)  Heart Rate:  [57-78] 57  Resp:  [16] 16  BP: (104-155)/(41-69) 104/41  SpO2:  [93 %-96 %] 93 %  on  Flow (L/min):  [1-2] 2;   O2 Device: nasal cannula  Body mass index is 24.68 kg/(m^2).    Objective:  General Appearance:  Comfortable and in no acute distress.    Vital signs: (most recent): Blood pressure 104/41, pulse 57, temperature 97.4 °F (36.3 °C), temperature source Oral, resp. rate 16, height 70\" (177.8 cm), weight 172 lb (78 kg), SpO2 93 %.    Output: Producing urine (Flores in place.  Strawberry colored urine.).    Lungs:  Normal effort.  Breath sounds clear to auscultation.    Heart: Normal rate.  Regular rhythm.    Abdomen: Abdomen is soft and non-distended.  Bowel sounds are normal.   There is no abdominal tenderness.     Extremities: There is no dependent edema.    Neurological: Patient is alert and oriented to person, place and time.    Skin:  Warm and dry.          Results Review:       I reviewed the patient's new clinical results.    Results from last 7 days  Lab Units 04/10/17  0645 17  0633 17  1132   WBC 10*3/mm3 7.24 6.36 6.60   HEMOGLOBIN g/dL 9.9* 7.4* 9.1*   PLATELETS 10*3/mm3 151 175 201       Results from last 7 days  Lab Units 04/10/17  0646 17  0633 17  1120   SODIUM mmol/L 143 143 142   POTASSIUM mmol/L 4.4 4.2 4.5   CHLORIDE mmol/L 106 108* 102   TOTAL CO2 mmol/L 22.7 24.6 25.7   BUN mg/dL 31* 27* 28*   CREATININE mg/dL 1.33* 1.35* 1.33*   GLUCOSE mg/dL 162* 122* 110*   Estimated Creatinine Clearance: 39.6 mL/min (by C-G formula based on Cr of 1.33).    Results from last 7 days  Lab Units 04/10/17  0646 17  0633 17  1120   CALCIUM " mg/dL 9.0 8.7 9.7       CT  ABD/PELVIS  WITHOUT CONTRAST   4/10/2017  Pending        allopurinol 100 mg Oral Daily   aminocaproic acid (AMICAR) infusion 2 g/hr Intravenous Q4H   atorvastatin 40 mg Oral Daily   ferrous sulfate 325 mg Oral BID   finasteride 5 mg Oral Daily   insulin aspart 0-9 Units Subcutaneous 4x Daily With Meals & Nightly   lisinopril 10 mg Oral Daily   pantoprazole 40 mg Oral BID AC   tamsulosin 0.4 mg Oral Nightly       sodium chloride 3,000 mL   NPO Diet      Assessment/Plan   Assessment:     Active Hospital Problems (** Indicates Principal Problem)    Diagnosis Date Noted   • **Gross hematuria [R31.0] 04/08/2017   • Pulmonary HTN [I27.2] 03/20/2017   • Cardiomyopathy [I42.9] 03/20/2017   • Chronic combined systolic and diastolic heart failure [I50.42] 02/17/2017   • Atrial fibrillation [I48.91] 02/16/2017   • Chronic kidney disease, stage 3 [N18.3] 02/13/2017   • Acute posthemorrhagic anemia [D62] 02/13/2017   • Malignant neoplasm of urinary bladder [C67.9] 03/30/2016   • Type 2 diabetes mellitus with hyperglycemia [E11.65] 03/22/2016   • Hypertension [I10] 03/22/2016   • Atherosclerosis of coronary artery [I25.10] 03/22/2016      Resolved Hospital Problems    Diagnosis Date Noted Date Resolved   No resolved problems to display.       Plan:   - Hemoglobin better following yesterday's transfusion.  Continue to monitor.  - Follow-up CT scan results.  Urology considering cystoscopy.  Radiation oncology has been consulted as well.  - Per patient wishes - DNR.        Krishan Vazquez MD  [facility]  04/10/17  10:18 AM

## 2017-04-10 NOTE — PLAN OF CARE
Problem: Patient Care Overview (Adult)  Goal: Plan of Care Review  Outcome: Ongoing (interventions implemented as appropriate)    04/10/17 0324   Coping/Psychosocial Response Interventions   Plan Of Care Reviewed With patient   Patient Care Overview   Progress progress toward functional goals is gradual   Outcome Evaluation   Outcome Summary/Follow up Plan Pt. is receiving CBI/TURP, bladder flowback very light pink/straw, Given Morphine PRN. NPO and taking steroids and benedryl to prepare for iodine use in procedure today.          Problem: Fall Risk (Adult)  Goal: Identify Related Risk Factors and Signs and Symptoms  Outcome: Ongoing (interventions implemented as appropriate)    04/10/17 0324   Fall Risk   Fall Risk: Related Risk Factors age-related changes;bladder function altered;environment unfamiliar   Fall Risk: Signs and Symptoms presence of risk factors       Goal: Absence of Falls  Outcome: Ongoing (interventions implemented as appropriate)    04/10/17 0324   Fall Risk (Adult)   Absence of Falls making progress toward outcome         Problem: Urine Elimination, Impaired (Adult)  Goal: Identify Related Risk Factors and Signs and Symptoms  Outcome: Ongoing (interventions implemented as appropriate)    04/10/17 0324   Urine Elimination, Impaired   Urine Elimination, Impaired: Related Risk Factors aging effects;disease process;urological disorder       Goal: Effective Urinary Elimination  Outcome: Ongoing (interventions implemented as appropriate)    04/10/17 0324   Urine Elimination, Impaired (Adult)   Effective Urinary Elimination making progress toward outcome       Goal: Effective Containment of Urine  Outcome: Ongoing (interventions implemented as appropriate)    04/10/17 0324   Urine Elimination, Impaired (Adult)   Effective Containment of Urine making progress toward outcome       Goal: Reduced Incontinence Episodes  Outcome: Ongoing (interventions implemented as appropriate)    04/10/17 0324   Urine  Elimination, Impaired (Adult)   Reduced Incontinence Episodes making progress toward outcome

## 2017-04-10 NOTE — PROGRESS NOTES
DAILY PROGRESS NOTE    CT reviewed today, extensive bladder tumor involving L lat wall of bladder near bladder neck, L hydro has developed. Cysto today performed, extensive amount of clot removed, bladder tumor is unresectable. Spoke w Dr. Martinez from Rad Onc and tomorrow will begin palliative xrt to bladder. Will keep bladder irrigation running until urine clear and give voiding trial. CT also showed large margarita pleural effusions and will defer to medicine management for that. Pt and family are comfortable with his DNR status. Will follow.

## 2017-04-10 NOTE — PLAN OF CARE
Problem: Perioperative Period (Adult)  Goal: Signs and Symptoms of Listed Potential Problems Will be Absent or Manageable (Perioperative Period)  Outcome: Outcome(s) achieved Date Met:  04/10/17

## 2017-04-11 NOTE — PROGRESS NOTES
"    Name: Imtiaz Jones ADMIT: 2017   : 1928  PCP: Charles Rose MD    MRN: [id number] LOS: 3 days   AGE/SEX: 88 y.o. male  ROOM: [facility]     Subjective   Denies any chest pain, SOA, nausea, vomiting or diarrhea.      Objective   Vital Signs  Temp:  [97.4 °F (36.3 °C)-97.9 °F (36.6 °C)] 97.9 °F (36.6 °C)  Heart Rate:  [53-85] 53  Resp:  [14-18] 16  BP: ()/(47-64) 134/51  SpO2:  [93 %-100 %] 100 %  on  Flow (L/min):  [2-5] 2;   O2 Device: nasal cannula  Body mass index is 24.68 kg/(m^2).    Objective:  General Appearance:  Comfortable and in no acute distress.    Vital signs: (most recent): Blood pressure 134/51, pulse 53, temperature 97.9 °F (36.6 °C), temperature source Oral, resp. rate 16, height 70\" (177.8 cm), weight 172 lb (78 kg), SpO2 100 %.    Output: Producing urine (Flores in place.  Strawberry colored urine.).    Lungs:  Normal effort.  Breath sounds clear to auscultation.    Heart: Normal rate.  Regular rhythm.    Abdomen: Abdomen is soft and non-distended.  Bowel sounds are normal.   There is no abdominal tenderness.     Extremities: There is no dependent edema.    Neurological: Patient is alert and oriented to person, place and time.    Skin:  Warm and dry.          Results Review:       I reviewed the patient's new clinical results.    Results from last 7 days  Lab Units 17  0440 04/10/17  0645 17  0633 17  1132   WBC 10*3/mm3 10.37 7.24 6.36 6.60   HEMOGLOBIN g/dL 10.1* 9.9* 7.4* 9.1*   PLATELETS 10*3/mm3 139* 151 175 201       Results from last 7 days  Lab Units 17  0440 04/10/17  0646 17  0633 17  1120   SODIUM mmol/L 141 143 143 142   POTASSIUM mmol/L 4.4 4.4 4.2 4.5   CHLORIDE mmol/L 106 106 108* 102   TOTAL CO2 mmol/L 19.8* 22.7 24.6 25.7   BUN mg/dL 46* 31* 27* 28*   CREATININE mg/dL 1.47* 1.33* 1.35* 1.33*   GLUCOSE mg/dL 142* 162* 122* 110*   Estimated Creatinine Clearance: 35.9 mL/min (by C-G formula based on Cr of 1.47).    Results " from last 7 days  Lab Units 04/11/17  0440 04/10/17  0646 04/09/17  0633 04/08/17  1120   CALCIUM mg/dL 8.7 9.0 8.7 9.7       CT  ABD/PELVIS  WITHOUT CONTRAST   4/10/2017  1. Bilateral renal cysts including a small hyperdense cyst in the upper  pole right kidney.  2. Moderate left hydronephrosis and hydroureter down to the left  ureterovesical junction. This obstruction is likely related to the  bladder carcinoma.  3. Large urinary bladder mass consistent with the patient's known  neoplasm. Floers catheter is seen within the urinary bladder.   4. Bilateral pleural effusions, left greater than right with bibasilar  atelectasis.   5. Stable splenic lesion. Mild splenomegaly is also noted.  6. Prostate gland enlargement.        allopurinol 100 mg Oral Daily   atorvastatin 40 mg Oral Daily   ferrous sulfate 325 mg Oral BID   finasteride 5 mg Oral Daily   insulin aspart 0-9 Units Subcutaneous 4x Daily With Meals & Nightly   lisinopril 10 mg Oral Daily   pantoprazole 40 mg Oral BID AC   tamsulosin 0.4 mg Oral Nightly       lactated ringers 9 mL/hr Last Rate: 9 mL/hr (04/10/17 1455)   sodium chloride 3,000 mL    NPO Diet      Assessment/Plan   Assessment:     Active Hospital Problems (** Indicates Principal Problem)    Diagnosis Date Noted   • **Gross hematuria [R31.0] 04/08/2017   • Pulmonary HTN [I27.2] 03/20/2017   • Cardiomyopathy [I42.9] 03/20/2017   • Pleural effusion, bilateral [J90] 03/17/2017   • Chronic combined systolic and diastolic heart failure [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       Plan:   - Hemoglobin better following yesterday's transfusion.  Continue to monitor.  - Continue CBI per urology.  Planning palliative radiation therapy as well.  - Per patient wishes - DNR.  - Pleural effusions have been present for weeks/months.  Seen on CXR 2/17.  Patient breathing comfortably.  No dyspnea.  No signs infection.  Continue outpatient monitoring.  Will restart his home dose Lasix.  Consider CXR follow up.      Krishan Vazquez MD  [facility]  04/11/17  9:34 AM

## 2017-04-11 NOTE — SIGNIFICANT NOTE
04/11/17 1102   Rehab Treatment   Discipline physical therapist   Rehab Evaluation   Evaluation Not Performed patient unavailable for evaluation  (at radiation; will check back later)   Recommendation   PT - Next Appointment 04/11/17

## 2017-04-11 NOTE — PLAN OF CARE
Problem: Patient Care Overview (Adult)  Goal: Plan of Care Review  Outcome: Ongoing (interventions implemented as appropriate)    04/11/17 4367   Coping/Psychosocial Response Interventions   Plan Of Care Reviewed With patient   Patient Care Overview   Progress progress toward functional goals as expected   Outcome Evaluation   Outcome Summary/Follow up Plan CBI continues, clear yellow. Had radiation this morning and will have again tomorrow. No clots present or sediment. Advaned diet, tolerating with no problems. Ambulate to chair as tolerated. Monitor labs and vitals.       Goal: Adult Individualization and Mutuality  Outcome: Ongoing (interventions implemented as appropriate)  Goal: Discharge Needs Assessment  Outcome: Ongoing (interventions implemented as appropriate)    Problem: Fall Risk (Adult)  Goal: Identify Related Risk Factors and Signs and Symptoms  Outcome: Ongoing (interventions implemented as appropriate)  Goal: Absence of Falls  Outcome: Ongoing (interventions implemented as appropriate)    Problem: Urine Elimination, Impaired (Adult)  Goal: Identify Related Risk Factors and Signs and Symptoms  Outcome: Ongoing (interventions implemented as appropriate)  Goal: Effective Urinary Elimination  Outcome: Ongoing (interventions implemented as appropriate)  Goal: Effective Containment of Urine  Outcome: Ongoing (interventions implemented as appropriate)  Goal: Reduced Incontinence Episodes  Outcome: Ongoing (interventions implemented as appropriate)    Problem: Infection, Risk/Actual (Adult)  Goal: Identify Related Risk Factors and Signs and Symptoms  Outcome: Ongoing (interventions implemented as appropriate)  Goal: Infection Prevention/Resolution  Outcome: Ongoing (interventions implemented as appropriate)

## 2017-04-11 NOTE — PROGRESS NOTES
DAILY PROGRESS NOTE    POD1 cysto clot evac. Pt w large unresectable bladder tumor w plans for palliative xrt to start today. Urine clear on min cbi. Labs are stable. Pt has large bilateral pleural efusions, plan per medical team. Pt has L hydro from bladder tumor obstructing L ureter. Will not intervene with perc at this and could not place stent.

## 2017-04-11 NOTE — PLAN OF CARE
Problem: Patient Care Overview (Adult)  Goal: Plan of Care Review  Outcome: Ongoing (interventions implemented as appropriate)    04/10/17 2001   Coping/Psychosocial Response Interventions   Plan Of Care Reviewed With patient   Patient Care Overview   Progress progress toward functional goals as expected   Outcome Evaluation   Outcome Summary/Follow up Plan Pt continues with bladder irrigation. Radiation dept to pick pt up at 0900 for therapy scan of bladder. Clots cleared by cystomy. Advance as tolerated diet. VSS will continue to monitor. 04/10/2017 2004       Goal: Adult Individualization and Mutuality  Outcome: Ongoing (interventions implemented as appropriate)  Goal: Discharge Needs Assessment  Outcome: Ongoing (interventions implemented as appropriate)    Problem: Fall Risk (Adult)  Goal: Identify Related Risk Factors and Signs and Symptoms  Outcome: Ongoing (interventions implemented as appropriate)  Goal: Absence of Falls  Outcome: Ongoing (interventions implemented as appropriate)    Problem: Urine Elimination, Impaired (Adult)  Goal: Identify Related Risk Factors and Signs and Symptoms  Outcome: Ongoing (interventions implemented as appropriate)  Goal: Effective Urinary Elimination  Outcome: Ongoing (interventions implemented as appropriate)  Goal: Effective Containment of Urine  Outcome: Ongoing (interventions implemented as appropriate)  Goal: Reduced Incontinence Episodes  Outcome: Ongoing (interventions implemented as appropriate)

## 2017-04-11 NOTE — SIGNIFICANT NOTE
04/11/17 1321   Rehab Treatment   Discipline physical therapist   Rehab Evaluation   Evaluation Not Performed (pt on continuous bladder irrigation at this time; issued some bed therex for pt to perform & will check back tomorrow )   Recommendation   PT - Next Appointment 04/12/17

## 2017-04-11 NOTE — OP NOTE
DATE OF PROCEDURE:    04/10/2017    PREOPERATIVE DIAGNOSES:    1.  Bladder cancer.  2.  Clot retention.     POSTOPERATIVE DIAGNOSES:  1.  Bladder cancer.  2.  Clot retention.     PROCEDURES PERFORMED:   1.  Cystoscopy.  2.  Clot evacuation.     SURGEON:  Vel Rogel MD     INDICATIONS:  An 88-year-old patient with known locally advanced bladder cancer who is being basically managed with palliation. Presented with urinary retention, clot retention. He is taken to the OR today for cystoscopy. CT scan was obtained preoperatively; showed an extensive amount of cancer and also what I suspect was clot in the urinary bladder, despite 3-way Flores catheter in place.     DESCRIPTION OF PROCEDURE:  Patient brought to the OR, given a general anesthetic. He was placed in lithotomy; prepped and draped. Cystoscopy was performed. The urethra was patent. The prostate was enlarged. The bladder neck was fixed and rigid. Mobilization of the scope to examine the left lateral wall was nearly impossible. He clearly had a very aggressive bladder cancer along the left lateral wall. This has been previously biopsied and confirmed. He does also have known left hydronephrosis from what is clearly extension of his bladder cancer. There was a significant amount of clot noted which was irrigated, and the bladder was felt to be free of clot at the end of the procedure. It was clear that this tumor was unresectable from a transurethral perspective, a gentleman that has declined radical cystectomy and chemotherapy on multiple occasions. At this juncture, a 3-way Flores catheter was placed. Irrigation was started.  The irrigant was relatively clear as he rolled out of the recovery room.             MUMTAZ Sheridan:ms  D:   04/10/2017 17:19:58  T:   04/10/2017 19:23:07  Job ID:   [id number]  Document ID:   [id number]  cc:     (dos)

## 2017-04-12 NOTE — SIGNIFICANT NOTE
04/12/17 1003   Rehab Treatment   Discipline physical therapist   Rehab Evaluation   Evaluation Not Performed patient unavailable for evaluation  (Leaving w/ transport at this time, plan to check back in pm. )   Recommendation   PT - Next Appointment 04/12/17

## 2017-04-12 NOTE — PROGRESS NOTES
4-12-17  Urology  VSS  Urine clear  XRT to begin today, palliative  Continue with plan as outlined yesterday by dr Rogel for unresectable bladder cancer

## 2017-04-12 NOTE — PLAN OF CARE
Problem: Patient Care Overview (Adult)  Goal: Plan of Care Review    04/12/17 1326   Coping/Psychosocial Response Interventions   Plan Of Care Reviewed With patient   Outcome Evaluation   Outcome Summary/Follow up Plan Pt demonstrates impaired strength, balance, gait pattern, and independence w/ mobility from baseline status. Ambulating in [place] w/ assist x1.         Problem: Inpatient Physical Therapy  Goal: Transfer Training Goal 1 LTG- PT    04/12/17 1326   Transfer Training PT LTG   Transfer Training PT LTG, Date Established 04/12/17   Transfer Training PT LTG, Time to Achieve 1 wk   Transfer Training PT LTG, Activity Type sit to stand/stand to sit;bed to chair /chair to bed   Transfer Training PT LTG, [place] Level conditional independence       Goal: Gait Training Goal LTG- PT    04/12/17 1326   Gait Training PT LTG   Gait Training Goal PT LTG, Date Established 04/12/17   Gait Training Goal PT LTG, Time to Achieve 1 wk   Gait Training Goal PT LTG, [place] Level supervision required   Gait Training Goal PT LTG, Distance to Achieve 300       Goal: Stair Training Goal LTG- PT    04/12/17 1326   Stair Training PT LTG   Stair Training Goal PT LTG, Date Established 04/12/17   Stair Training Goal PT LTG, Time to Achieve 1 wk   Stair Training Goal PT LTG, Number of Steps 3   Stair Training Goal PT LTG, [place] Level contact guard assist   Stair Training Goal PT LTG, Assist Device 2 handrails

## 2017-04-12 NOTE — PROGRESS NOTES
Acute Care - Physical Therapy Initial Evaluation  [facility]     Patient Name: Imtiaz Jones  : 1928  MRN: [id number]  Today's Date: 2017   Onset of Illness/Injury or Date of Surgery Date: 17            Admit Date: 2017     Visit Dx:    ICD-10-CM ICD-9-CM   1. Gross hematuria R31.0 599.71   2. Urinary retention R33.9 788.20   3. Malignant neoplasm of urinary bladder, unspecified site C67.9 188.9     Patient Active Problem List   Diagnosis   • Calculus of kidney   • Hypertension   • Hyperlipidemia   • Gout   • Type 2 diabetes mellitus with hyperglycemia   • Atherosclerosis of coronary artery   • Malignant neoplasm of urinary bladder   • Hyperglycemia   • Microscopic hematuria   • Chronic kidney disease, stage 3   • Iron deficiency anemia due to chronic blood loss   • BPH (benign prostatic hyperplasia)   • Acute posthemorrhagic anemia   • Colon polyps   • Diverticulosis   • Angiodysplasia of colon   • Atrial fibrillation   • Chronic combined systolic and diastolic heart failure   • Acute respiratory failure with hypoxia   • Pleural effusion, bilateral   • Pulmonary HTN   • Cardiomyopathy   • Gross hematuria     Past Medical History:   Diagnosis Date   • Acute arthritis    • Acute renal insufficiency    • Anemia    • Angiodysplasia     of colon   • Atherosclerosis of native coronary artery of native heart without angina pectoris    • Atopic dermatitis    • BPH (benign prostatic hyperplasia)    • Cardiomyopathy 3/20/2017   • Carotid artery disease     HAD ABBEY ENDARTERECTOMY   • CKD (chronic kidney disease)    • Coronary artery disease    • Diabetes mellitus, type II     TYPE 2   • Edema    • Elbow pain    • Gout    • Hematuria     HISTORY REASON WHY SURGERY WAS CANCELED 2 WEEKS AGO   • Hyperlipidemia    • Hypertension     OFF MEDICATION FOR NOW.    • Joint effusion of elbow    • Joint effusion of elbow    • Lightheadedness    • Malignant neoplasm of bladder     ALSO CANCER ON LT LEG   •  Near syncope    • Nephrolithiasis    • Neuralgia    • Psychogenic dysuria    • Pulmonary HTN 3/20/2017   • Renal insufficiency syndrome    • Skin avulsion    • Urinary retention    • UTI (urinary tract infection)      Past Surgical History:   Procedure Laterality Date   • CAROTID ENDARTERECTOMY Bilateral    • CATARACT EXTRACTION Bilateral    • COLONOSCOPY      Complete   • COLONOSCOPY N/A 2/15/2017    Procedure: COLONOSCOPY to cecum and TI with cold and hot snare polypectomies;  Surgeon: Jae Rosado MD;  Location: [facility] ENDOSCOPY;  Service:    • CORONARY ARTERY BYPASS GRAFT  1980'S & 2003    2 different surgeries BOTH SURGERY 3 VESSELS DONE   • ENDOSCOPY N/A 2/15/2017    Procedure: ESOPHAGOGASTRODUODENOSCOPY with biopsies;  Surgeon: Jae Rosado MD;  Location: [facility] ENDOSCOPY;  Service:    • GALLBLADDER SURGERY     • DC DRAIN LOWER LEG DEEP ABSC/HEMATOMA Left 7/14/2016    Procedure: LOWER EXTREMITY DEBRIDEMENT, EXCISION OF LT LEG SKIN TUMOR WITH FULL THICKNESS SKIN GRAFT RECONSTRUCTION;  Surgeon: Molina Jackson MD;  Location: [facility] OR;  Service: Plastics   • TRANSURETHRAL RESECTION OF BLADDER TUMOR N/A 4/10/2017    Procedure: CYSTOSCOPY WITH CLOT EVACUATION;  Surgeon: Vel Rogel MD;  Location: [facility] OR;  Service:           PT ASSESSMENT (last 72 hours)      PT Evaluation       04/12/17 1323 04/12/17 1003    Rehab Evaluation    Document Type evaluation  -AR     Subjective Information agree to therapy  -AR     Evaluation Not Performed  patient unavailable for evaluation   Leaving w/ transport at this time, plan to check back in pm.   -AR    Patient Effort, Rehab Treatment good  -AR     Symptoms Noted During/After Treatment fatigue  -AR     General Information    Patient Profile Review yes  -AR     Onset of Illness/Injury or Date of Surgery Date 04/08/17  -AR     Precautions/Limitations fall precautions  -AR     Prior Level of Function independent:;shopping  -AR     Equipment  Currently Used at Home none  -AR     Barriers to Rehab none identified  -AR     Living Environment    Lives With spouse  -AR     Living Arrangements house  -AR     Home Accessibility stairs to enter home  -AR     Number of Stairs to Enter Home 3  -AR     Stair Railings at Home inside, present at both sides  -AR     Clinical Impression    Patient/Family Goals Statement DC home  -AR     Criteria for Skilled Therapeutic Interventions Met yes  -AR     Pathology/Pathophysiology Noted (Describe Specifically for Each System) musculoskeletal  -AR     Impairments Found (describe specific impairments) aerobic capacity/endurance;gait, locomotion, and balance  -AR     Rehab Potential good, to achieve stated therapy goals  -AR     Pain Assessment    Pain Assessment No/denies pain  -AR     Cognitive Assessment/Intervention    Current Cognitive/Communication Assessment functional  -AR     Orientation Status oriented x 4  -AR     Follows Commands/Answers Questions 100% of the time  -AR     Personal Safety WNL/WFL  -AR     ROM (Range of Motion)    General ROM --   BLE WFL  -AR     MMT (Manual Muscle Testing)    General MMT Assessment --   B LE -4/5  -AR     Bed Mobility, Assessment/Treatment    Bed Mobility, Assistive Device bed rails;head of bed elevated  -AR     Bed Mob, Supine to Sit, Oak[place] supervision required  -AR     Bed Mob, Sit to Supine, [place] not tested  -AR     Transfer Assessment/Treatment    Transfers, Sit-Stand Oak[place] contact guard assist  -AR     Transfers, Stand-Sit [place] contact guard assist  -AR     Transfers, Sit-Stand-Sit, Assist Device rolling walker  -AR     Gait Assessment/Treatment    Gait, Oak[place] Level contact guard assist  -AR     Gait, Assistive Device rolling walker   75 w/ RW, 75' w/o. no decline in Ind. w/o AD  -AR     Gait, Distance (Feet) 150  -AR     Gait, Gait Pattern Analysis swing-through gait  -AR     Gait, Gait Deviations maria isabel decreased  -AR     Gait, Safety  Issues step length decreased  -AR     Gait, Impairments strength decreased;impaired balance  -AR     Positioning and Restraints    Pre-Treatment Position in bed  -AR     Post Treatment Position chair  -AR     In Chair sitting;call light within reach;encouraged to call for assist  -AR       04/11/17 1321 04/11/17 1102    Rehab Evaluation    Evaluation Not Performed --   pt on continuous bladder irrigation at this time; issued some bed therex for pt to perform & will check back tomorrow   -DM patient unavailable for evaluation   at radiation; will check back later  -DM      04/10/17 0949 04/10/17 0916    Rehab Evaluation    Evaluation Not Performed  other (see comments)   CODY Houston states pt is getting ready to go to CT then will go to OR later today for bladder cancer resection. RN agreed that PT should check back tomorrow.  -    General Information    Equipment Currently Used at Home oxygen   has o2 from [employer], but he only uses prn @ [facility]  -     Living Environment    Lives With spouse  -     Living Arrangements house  -     Transportation Available car;family or friend will provide  -       User Key  (r) = Recorded By, (t) = Taken By, (c) = Cosigned By    Initials Name Provider Type     Sruthi Patterson, PT Physical Therapist    ABDON Moore, PT Physical Therapist    JAGDISH Shaw, PT Physical Therapist    LUCY Pruitt, RN Case Manager          Physical Therapy Education     Title: PT OT SLP Therapies (Active)     Topic: Physical Therapy (Active)     Point: Mobility training (Active)    Learning Progress Summary    Learner Readiness Method Response Comment Documented by Status   Patient Acceptance E NR  AR 04/12/17 1327 Active               Point: Home exercise program (Active)    Learning Progress Summary    Learner Readiness Method Response Comment Documented by Status   Patient Acceptance E NR  AR 04/12/17 1327 Active               Point: Body mechanics (Active)    Learning Progress  Summary    Learner Readiness Method Response Comment Documented by Status   Patient Acceptance E NR  AR 04/12/17 1327 Active               Point: Precautions (Active)    Learning Progress Summary    Learner Readiness Method Response Comment Documented by Status   Patient Acceptance E NR  AR 04/12/17 1327 Active                      User Key     Initials Effective Dates Name Provider Type Discipline    AR 06/27/16 -  Alice Shaw PT Physical Therapist PT                PT Recommendation and Plan  Anticipated Equipment Needs At Discharge:  (TBD)  Anticipated Discharge Disposition: home with assist, home with home health  Planned Therapy Interventions: balance training, bed mobility training, gait training, home exercise program, patient/family education, ROM (Range of Motion), stair training, strengthening  PT Frequency: daily  Plan of Care Review  Plan Of Care Reviewed With: patient  Outcome Summary/Follow up Plan: Pt demonstrates impaired strength, balance, gait pattern, and independence w/ mobility from baseline status.  Ambulating in [place] w/ assist x1.          IP PT Goals       04/12/17 1326          Transfer Training PT LTG    Transfer Training PT LTG, Date Established 04/12/17  -AR      Transfer Training PT LTG, Time to Achieve 1 wk  -AR      Transfer Training PT LTG, Activity Type sit to stand/stand to sit;bed to chair /chair to bed  -AR      Transfer Training PT LTG, [place] Level conditional independence  -AR      Gait Training PT LTG    Gait Training Goal PT LTG, Date Established 04/12/17  -AR      Gait Training Goal PT LTG, Time to Achieve 1 wk  -AR      Gait Training Goal PT LTG, [place] Level supervision required  -AR      Gait Training Goal PT LTG, Distance to Achieve 300  -AR      Stair Training PT LTG    Stair Training Goal PT LTG, Date Established 04/12/17  -AR      Stair Training Goal PT LTG, Time to Achieve 1 wk  -AR      Stair Training Goal PT LTG, Number of Steps 3  -AR      Stair  Training Goal PT LTG, [place] Level contact guard assist  -AR      Stair Training Goal PT LTG, Assist Device 2 handrails  -AR        User Key  (r) = Recorded By, (t) = Taken By, (c) = Cosigned By    Initials Name Provider Type    JAGDISH Shaw PT Physical Therapist                Outcome Measures       04/12/17 1300          How much help from another person do you currently need...    Turning from your back to your side while in flat bed without using bedrails? 4  -AR      Moving from lying on back to sitting on the side of a flat bed without bedrails? 4  -AR      Moving to and from a bed to a chair (including a wheelchair)? 3  -AR      Standing up from a chair using your arms (e.g., wheelchair, bedside chair)? 3  -AR      Climbing 3-5 steps with a railing? 3  -AR      To walk in hospital room? 3  -AR      AM-PAC 6 Clicks Score 20  -AR      Functional Assessment    Outcome Measure Options AM-PAC 6 Clicks Basic Mobility (PT)  -AR        User Key  (r) = Recorded By, (t) = Taken By, (c) = Cosigned By    Initials Name Provider Type    AR Alice Shaw PT Physical Therapist           Time Calculation:         PT Charges       04/12/17 1328 04/12/17 1003       Time Calculation    Start Time 1300  -AR      Stop Time 1328  -AR      Time Calculation (min) 28 min  -AR      PT Received On 04/12/17  -AR      PT - Next Appointment 04/13/17  -AR 04/12/17  -AR     PT Goal Re-Cert Due Date 04/19/17  -AR        User Key  (r) = Recorded By, (t) = Taken By, (c) = Cosigned By    Initials Name Provider Type    JAGDISH Shaw PT Physical Therapist          Therapy Charges for Today     Code Description Service Date Service Provider Modifiers Qty    77158226047 HC PT EVAL LOW COMPLEXITY 2 4/12/2017 Alice Shaw, PT GP 1    41311479286 HC PT THER SUPP EA 15 MIN 4/12/2017 Alice Shaw PT GP 1    12671818727 HC PT THER PROC EA 15 MIN 4/12/2017 Alice Shaw PT GP 1          PT G-Codes  Outcome Measure Options:  AM-PAC 6 Clicks Basic Mobility (PT)      Alice Shaw, PT  4/12/2017

## 2017-04-12 NOTE — PLAN OF CARE
Problem: Patient Care Overview (Adult)  Goal: Plan of Care Review  Outcome: Ongoing (interventions implemented as appropriate)    04/12/17 8696   Coping/Psychosocial Response Interventions   Plan Of Care Reviewed With patient;spouse   Patient Care Overview   Progress improving   Outcome Evaluation   Outcome Summary/Follow up Plan Blood sugars have been good with no coverage. Radiation this morning and again tomorrow morning. CBI still going, slowed but going fast enough to maintain patency. May go home tomorrow after radiation if ok with urology.        Goal: Adult Individualization and Mutuality  Outcome: Ongoing (interventions implemented as appropriate)  Goal: Discharge Needs Assessment  Outcome: Ongoing (interventions implemented as appropriate)    Problem: Fall Risk (Adult)  Goal: Absence of Falls  Outcome: Ongoing (interventions implemented as appropriate)    Problem: Urine Elimination, Impaired (Adult)  Goal: Effective Urinary Elimination  Outcome: Ongoing (interventions implemented as appropriate)  Goal: Effective Containment of Urine  Outcome: Ongoing (interventions implemented as appropriate)  Goal: Reduced Incontinence Episodes  Outcome: Ongoing (interventions implemented as appropriate)    Problem: Infection, Risk/Actual (Adult)  Goal: Infection Prevention/Resolution  Outcome: Ongoing (interventions implemented as appropriate)

## 2017-04-12 NOTE — PROGRESS NOTES
"    Name: Imtiaz Jones ADMIT: 2017   : 1928  PCP: Charles Rose MD    MRN: [id number] LOS: 4 days   AGE/SEX: 88 y.o. male  ROOM: [facility]/     Subjective   Denies any chest pain, SOA, nausea, vomiting or diarrhea.      Objective   Vital Signs  Temp:  [97.5 °F (36.4 °C)-98.6 °F (37 °C)] 98 °F (36.7 °C)  Heart Rate:  [58-63] 63  Resp:  [16] 16  BP: (106-132)/(44-57) 132/57  SpO2:  [91 %-97 %] 96 %  on  Flow (L/min):  [1-2] 1;   O2 Device: nasal cannula  Body mass index is 24.68 kg/(m^2).    Objective:  General Appearance:  Comfortable and in no acute distress.    Vital signs: (most recent): Blood pressure 132/57, pulse 63, temperature 98 °F (36.7 °C), temperature source Oral, resp. rate 16, height 70\" (177.8 cm), weight 172 lb (78 kg), SpO2 96 %.    Output: Producing urine (Flores in place.  Strawberry colored urine.).    Lungs:  Normal effort.  Breath sounds clear to auscultation.    Heart: Normal rate.  Regular rhythm.    Abdomen: Abdomen is soft and non-distended.  Bowel sounds are normal.   There is no abdominal tenderness.     Extremities: There is no dependent edema.    Neurological: Patient is alert and oriented to person, place and time.    Skin:  Warm and dry.          Results Review:       I reviewed the patient's new clinical results.    Results from last 7 days  Lab Units 17  0431 17  0440 04/10/17  0645 17  0633 17  1132   WBC 10*3/mm3 9.16 10.37 7.24 6.36 6.60   HEMOGLOBIN g/dL 10.5* 10.1* 9.9* 7.4* 9.1*   PLATELETS 10*3/mm3 131* 139* 151 175 201       Results from last 7 days  Lab Units 17  0431 17  0440 04/10/17  0646 17  0633 17  1120   SODIUM mmol/L 143 141 143 143 142   POTASSIUM mmol/L 3.9 4.4 4.4 4.2 4.5   CHLORIDE mmol/L 107 106 106 108* 102   TOTAL CO2 mmol/L 24.3 19.8* 22.7 24.6 25.7   BUN mg/dL 47* 46* 31* 27* 28*   CREATININE mg/dL 1.50* 1.47* 1.33* 1.35* 1.33*   GLUCOSE mg/dL 113* 142* 162* 122* 110*   Estimated Creatinine " Clearance: 35.1 mL/min (by C-G formula based on Cr of 1.5).    Results from last 7 days  Lab Units 04/12/17  0431 04/11/17  0440 04/10/17  0646 04/09/17  0633 04/08/17  1120   CALCIUM mg/dL 8.6 8.7 9.0 8.7 9.7       CT  ABD/PELVIS  WITHOUT CONTRAST   4/10/2017  1. Bilateral renal cysts including a small hyperdense cyst in the upper  pole right kidney.  2. Moderate left hydronephrosis and hydroureter down to the left  ureterovesical junction. This obstruction is likely related to the  bladder carcinoma.  3. Large urinary bladder mass consistent with the patient's known  neoplasm. Flores catheter is seen within the urinary bladder.   4. Bilateral pleural effusions, left greater than right with bibasilar  atelectasis.   5. Stable splenic lesion. Mild splenomegaly is also noted.  6. Prostate gland enlargement.        allopurinol 100 mg Oral Daily   atorvastatin 40 mg Oral Daily   ferrous sulfate 325 mg Oral BID   finasteride 5 mg Oral Daily   furosemide 10 mg Oral Daily   insulin aspart 0-9 Units Subcutaneous 4x Daily With Meals & Nightly   lisinopril 10 mg Oral Daily   pantoprazole 40 mg Oral BID AC   tamsulosin 0.4 mg Oral Nightly       lactated ringers 9 mL/hr Last Rate: 9 mL/hr (04/10/17 1455)   sodium chloride 3,000 mL    Diet Regular; Cardiac      Assessment/Plan   Assessment:     Active Hospital Problems (** Indicates Principal Problem)    Diagnosis Date Noted   • **Gross hematuria [R31.0] 04/08/2017   • Pulmonary HTN [I27.2] 03/20/2017   • Cardiomyopathy [I42.9] 03/20/2017   • Pleural effusion, bilateral [J90] 03/17/2017   • Chronic combined systolic and diastolic heart failure [I50.42] 02/17/2017   • Atrial fibrillation [I48.91] 02/16/2017   • Chronic kidney disease, stage 3 [N18.3] 02/13/2017   • Acute posthemorrhagic anemia [D62] 02/13/2017   • Malignant neoplasm of urinary bladder [C67.9] 03/30/2016   • Type 2 diabetes mellitus with hyperglycemia [E11.65] 03/22/2016   • Hypertension [I10] 03/22/2016   •  Atherosclerosis of coronary artery [I25.10] 03/22/2016      Resolved Hospital Problems    Diagnosis Date Noted Date Resolved   No resolved problems to display.       Plan:   - Hemoglobin better following transfusion.  Continue to monitor.  - Continue CBI per urology.  Planning palliative radiation therapy as well.  - Per patient wishes - DNR.  - Pleural effusions have been present for weeks/months.  Seen on CXR 2/17.  Patient breathing comfortably.  No dyspnea.  No signs infection.  Continue outpatient monitoring.  Will restart his home dose Lasix.  Consider CXR follow up.    ** Possibly discharge tomorrow after second round of radiation.  Urology to decide about catheter.      Krishan Vazquez MD  [facility]  04/12/17  8:57 AM

## 2017-04-13 PROBLEM — J90 PLEURAL EFFUSION, BILATERAL: Status: RESOLVED | Noted: 2017-01-01 | Resolved: 2017-01-01

## 2017-04-13 PROBLEM — R31.0 GROSS HEMATURIA: Status: RESOLVED | Noted: 2017-01-01 | Resolved: 2017-01-01

## 2017-04-13 PROBLEM — D62 ACUTE POSTHEMORRHAGIC ANEMIA: Status: RESOLVED | Noted: 2017-02-13 | Resolved: 2017-01-01

## 2017-04-13 NOTE — PLAN OF CARE
Problem: Patient Care Overview (Adult)  Goal: Plan of Care Review  Outcome: Ongoing (interventions implemented as appropriate)    04/12/17 1843 04/12/17 2011 04/12/17 2340   Coping/Psychosocial Response Interventions   Plan Of Care Reviewed With --  patient --    Patient Care Overview   Progress improving --  --    Outcome Evaluation   Outcome Summary/Follow up Plan --  --  cbi, moderate pain, monitor hgb, ac/hs, expect radiation tx in am , and w/ cond, potential d/c home w        Goal: Adult Individualization and Mutuality  Outcome: Ongoing (interventions implemented as appropriate)  Goal: Discharge Needs Assessment  Outcome: Ongoing (interventions implemented as appropriate)    Problem: Fall Risk (Adult)  Goal: Absence of Falls  Outcome: Ongoing (interventions implemented as appropriate)    Problem: Urine Elimination, Impaired (Adult)  Goal: Effective Urinary Elimination  Outcome: Ongoing (interventions implemented as appropriate)  Goal: Effective Containment of Urine  Outcome: Ongoing (interventions implemented as appropriate)  Goal: Reduced Incontinence Episodes  Outcome: Ongoing (interventions implemented as appropriate)    Problem: Infection, Risk/Actual (Adult)  Goal: Infection Prevention/Resolution  Outcome: Ongoing (interventions implemented as appropriate)

## 2017-04-13 NOTE — PROGRESS NOTES
Continued Stay Note  [facility]     Patient Name: Imtiaz Jones  MRN: [id number]  Today's Date: 4/13/2017    Admit Date: 4/8/2017          Discharge Plan       04/13/17 1512    Case Management/Social Work Plan    Plan home with wife and [facility]    Additional Comments Received call from Regine/[facility].  Wife expressed concern about how patient would get to radiation.  Spoke with patient and he states that he will drive himself.  I did provide him with an application for [employer] in case needed in the future.  DC plan is home with [facility].               Discharge Codes     None        Expected Discharge Date and Time     Expected Discharge Date Expected Discharge Time    Apr 13, 2017             Chelsey Pruitt RN

## 2017-04-13 NOTE — SIGNIFICANT NOTE
04/13/17 1407   Rehab Treatment   Discipline physical therapy assistant   Treatment Not Performed patient/family declined treatment  (Pt getting bladder scan and states that he is going home today and wants to pass on therapy to save his energy for that)

## 2017-04-13 NOTE — PROGRESS NOTES
4-13-17  Urology  VSS  Bladder cancer  Palliative XRT started yesterday  Urine clear   Will DC chung today

## 2017-04-13 NOTE — DISCHARGE SUMMARY
"       NAME: Imtiaz Jones ADMIT: 2017   : 1928  PCP: Charles Rose MD    MRN: [id number] LOS: 5 days   AGE/SEX: 88 y.o. male  ROOM: [facility]     Date of Admission:  2017  Date of Discharge:  2017    PCP: Charles Rose MD    CHIEF COMPLAINT  Difficulty Urinating (\"I have cancer of the bladder.  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       SECONDARY DIAGNOSES  Past Medical History:   Diagnosis Date   • Acute arthritis    • Acute renal insufficiency    • Anemia    • Angiodysplasia     of colon   • Atherosclerosis of native coronary artery of native heart without angina pectoris    • Atopic dermatitis    • BPH (benign prostatic hyperplasia)    • Cardiomyopathy 3/20/2017   • Carotid artery disease     HAD ABBEY ENDARTERECTOMY   • CKD (chronic kidney disease)    • Coronary artery disease    • Diabetes mellitus, type II     TYPE 2   • Edema    • Elbow pain    • Gout    • Hematuria     HISTORY REASON WHY SURGERY WAS CANCELED 2 WEEKS AGO   • Hyperlipidemia    • Hypertension     OFF MEDICATION FOR NOW.    • Joint effusion of elbow    • Joint effusion of elbow  "   • Lightheadedness    • Malignant neoplasm of bladder     ALSO CANCER ON LT LEG   • Near syncope    • Nephrolithiasis    • Neuralgia    • Psychogenic dysuria    • Pulmonary HTN 3/20/2017   • Renal insufficiency syndrome    • Skin avulsion    • Urinary retention    • UTI (urinary tract infection)        CONSULTS   Consults     Date and Time Order Name Status Description    4/9/2017 1057 Inpatient Consult to Radiation Oncology Completed     4/8/2017 1204 [facility] (on-call MD unless specified) Completed     4/8/2017 1032 IP Consult to Urology Completed           PROCEDURES PERFORMED  CYSTOSCOPY WITH CLOT EVACUATION  4/10/2017  Vel Rogel MD     CT ABD/PELVIS WITHOUT CONTRAST 4/10/2017  1. Bilateral renal cysts including a small hyperdense cyst in the upper  pole right kidney.  2. Moderate left hydronephrosis and hydroureter down to the left  ureterovesical junction. This obstruction is likely related to the  bladder carcinoma.  3. Large urinary bladder mass consistent with the patient's known  neoplasm. Flores catheter is seen within the urinary bladder.   4. Bilateral pleural effusions, left greater than right with bibasilar  atelectasis.   5. Stable splenic lesion. Mild splenomegaly is also noted.  6. Prostate gland enlargement.        HOSPITAL COURSE  This is an 88-year-old gentleman came into the hospital with difficulty urinating and hematuria.  Please see the admitting history and physical for further details.  He has a history of advanced bladder cancer that he had previously stated he wanted no treatment for.  He has had this for several years without too much difficulty.  Of late he's been having recurrent hematuria.  This is his second hospitalization since February.  He required continuous bladder irrigation and even blood transfusion due to blood loss anemia.  He was seen by his urologist, Dr. Louis.  He underwent CT scan with results outlined above as well as cystoscopy for clot evacuation.   "Patient still only wants palliative measures given his advanced disease and overall otherwise good health it was decided to pursue palliative radiation.  He was seen by Dr. Lee Yi and has already undergone 2 radiation treatments.  He will continue outpatient follow-up for a believe 10 treatments initially.  Today's catheter was removed and he appears be voiding adequately.  Will plan on discharge today with outpatient follow-up.  He will have Home Health for PT.        PHYSICAL EXAM  Objective:  General Appearance:  Comfortable and in no acute distress.    Vital signs: (most recent): Blood pressure 127/56, pulse 81, temperature 98 °F (36.7 °C), temperature source Oral, resp. rate 16, height 70\" (177.8 cm), weight 172 lb (78 kg), SpO2 96 %.    Lungs:  Normal effort.  Breath sounds clear to auscultation.    Heart: Normal rate.  Regular rhythm.    Abdomen: Abdomen is soft and non-distended.  Bowel sounds are normal.   There is no abdominal tenderness.     Extremities: There is no dependent edema.    Neurological: Patient is alert and oriented to person, place and time.    Skin:  Warm and dry.            CONDITION ON DISCHARGE  Stable.      DISCHARGE DISPOSITION   Home or Self Care      DISCHARGE MEDICATIONS   Imtiaz Jones   Home Medication Instructions JONATHAN:[id number]    Printed on:04/13/17 3620   Medication Information                      allopurinol (ZYLOPRIM) 100 MG tablet  Take 1 tablet by mouth Daily.             ferrous sulfate 325 (65 FE) MG tablet  Take 1 tablet by mouth 3 (Three) Times a Day With Meals.             finasteride (PROSCAR) 5 MG tablet  Take 1 tablet by mouth daily.             furosemide (LASIX) 10 MG/ML solution  Take 10 mg by mouth Daily.             lisinopril (PRINIVIL,ZESTRIL) 20 MG tablet  Take 0.5 tablets by mouth Daily.             pantoprazole (PROTONIX) 40 MG EC tablet  Take 1 tablet by mouth 2 (Two) Times a Day Before Meals.             simvastatin (ZOCOR) 80 MG " tablet  TAKE 1 TABLET DAILY IN THE EVENING             tamsulosin (FLOMAX) 0.4 MG capsule  Take 1 capsule by mouth every night.               Diet Instructions     Diet:       Diet Texture / Consistency:  Regular   Common Modifiers:  Cardiac              Activity Instructions     Activity as Tolerated                 Future Appointments  Date Time Provider Department Center   4/14/2017 9:45 AM [facility] [address] [facility]   4/17/2017 9:45 AM [facility] [address] [facility]   4/17/2017 1:30 PM Jae Rosado MD [facility] None   4/18/2017 9:45 AM [facility] [address] [facility]   4/19/2017 9:45 AM [facility] [address] [facility]   4/20/2017 9:45 AM [facility] [address] [facility]   4/20/2017 11:00 AM Charles Rose MD [facility] None   4/21/2017 9:45 AM [facility] [address] [facility]   4/24/2017 9:45 AM [facility] [address] [facility]   4/25/2017 9:45 AM [facility] [address] [facility]   6/20/2017 12:30 PM Charles Rose MD [facility] None   9/25/2017 11:00 AM Carl Knowles III, MD [facility] None     Additional Instructions for the Follow-ups that You Need to Schedule     Referral to Home Health    As directed    Face to Face Visit Date:  4/13/2017   Follow-up Provider for Plan of Care?:  I treated the patient in an acute care facility and will not continue treatment after discharge.   Follow-up Provider:  CHARLES ROSE   Reason/Clinical Findings:  advanced bladder cancer   Describe mobility limitations that make leaving home difficult:  Requires assistance of another to leave the home   Nursing/Therapeutic Services Requested:  Physical Therapy   PT orders:   Strengthening  Gait Training  Home safety assessment  Therapeutic exercise  Transfer training      Weight Bearing Status:  As Tolerated   Frequency:  1 Week 1             Follow-up Information     Follow up with Vel Rogel MD Follow up in 3 day(s).    Specialty:  Urology    Why:  Call for Appointment    Contact information:    [address] [facility] [id number] [phone]          Follow up with Charles Rose MD Follow up in 7 day(s).    Specialty:  Family Medicine    Why:  Follow up appointment with Dr. Rose on April 20th 11:00 AM    Contact information:    [address]  [address]  [facility] [id number] [phone]          Follow up with [facility] .    Specialty:  Home Health Services    Contact information:    [address]  [facility] [address] [phone]              Krishan Vazquez MD  [facility]  04/13/17  3:33 PM      Time: greater than 30 minutes.      Dragon disclaimer:  Much of this encounter note is an electronic transcription/translation of spoken language to printed text. The electronic translation of spoken language may permit erroneous, or at times, nonsensical words or phrases to be inadvertently transcribed; Although I have reviewed the note for such errors, some may still exist.

## 2017-04-13 NOTE — PLAN OF CARE
Problem: Patient Care Overview (Adult)  Goal: Plan of Care Review  Outcome: Ongoing (interventions implemented as appropriate)    04/13/17 6845   Coping/Psychosocial Response Interventions   Plan Of Care Reviewed With patient   Patient Care Overview   Progress improving   Outcome Evaluation   Outcome Summary/Follow up Plan CBI discontinued this morning, now voiding spontaneously monitor output. No complaints of pain today. Ambulating with stand-by assist. Will be going home with [facility]. Discharged today once daughter arrives.       Goal: Adult Individualization and Mutuality  Outcome: Ongoing (interventions implemented as appropriate)  Goal: Discharge Needs Assessment  Outcome: Ongoing (interventions implemented as appropriate)    Problem: Fall Risk (Adult)  Goal: Absence of Falls  Outcome: Ongoing (interventions implemented as appropriate)    Problem: Urine Elimination, Impaired (Adult)  Goal: Effective Urinary Elimination  Outcome: Ongoing (interventions implemented as appropriate)  Goal: Effective Containment of Urine  Outcome: Ongoing (interventions implemented as appropriate)  Goal: Reduced Incontinence Episodes  Outcome: Ongoing (interventions implemented as appropriate)    Problem: Infection, Risk/Actual (Adult)  Goal: Infection Prevention/Resolution  Outcome: Ongoing (interventions implemented as appropriate)

## 2017-04-14 NOTE — PROGRESS NOTES
Continued Stay Note  [facility]     Patient Name: Imtiaz Jnoes  MRN: [id number]  Today's Date: 4/14/2017    Admit Date: 4/8/2017          Discharge Plan       04/14/17 0829    Final Note    Final Note DC home with [facility]              Discharge Codes       04/14/17 0830    Discharge Codes    Discharge Codes 06  Discharged/transferred to home under care of organized home health service organization in anticipation of skilled care        Expected Discharge Date and Time     Expected Discharge Date Expected Discharge Time    Apr 13, 2017             Chelsey Pruitt RN

## 2017-04-18 NOTE — TELEPHONE ENCOUNTER
This medication was discontinued and restarted by Jose Slaughter M.D. As 1/2 tablet daily.  Please advise.

## 2017-04-20 PROBLEM — I25.10 CHRONIC CORONARY ARTERY DISEASE: Status: ACTIVE | Noted: 2017-01-01

## 2017-04-27 PROBLEM — J96.01 ACUTE RESPIRATORY FAILURE WITH HYPOXIA (HCC): Status: RESOLVED | Noted: 2017-01-01 | Resolved: 2017-01-01

## 2017-04-27 NOTE — PROGRESS NOTES
Subjective   Imtiaz Jones is a 88 y.o. male.     Chief Complaint   Patient presents with   [facility] follow up for urinary retention         History of Present Illness patient be monitored by urologist for urinary retention and bladder cancer doing fairly well 9 does not have a catheter at this time and is moving his urine he also has history of coronary disease with no evidence of chest pain blood pressures well-controlled sugars have still been intermittently elevated has not taken any diabetic medication at this time his note rashes or change in visions or increased thirst records were reviewed from hospital    The following portions of the patient's history were reviewed and updated as appropriate: allergies, current medications, past family history, past medical history, past social history, past surgical history and problem list.    Review of Systems   Constitutional: Negative for activity change, appetite change and unexpected weight change.   HENT: Negative.  Negative for nosebleeds and trouble swallowing.    Eyes: Negative for pain and visual disturbance.   Respiratory: Negative for apnea, cough, choking, chest tightness, shortness of breath, wheezing and stridor.    Cardiovascular: Negative for chest pain, palpitations and leg swelling.   Gastrointestinal: Negative for abdominal pain, blood in stool, constipation and diarrhea.   Endocrine: Negative.    Genitourinary: Negative for difficulty urinating and hematuria.   Musculoskeletal: Negative for joint swelling.   Skin: Negative for color change and rash.   Allergic/Immunologic: Negative.    Neurological: Negative for syncope and speech difficulty.   Hematological: Negative for adenopathy.   Psychiatric/Behavioral: Negative for agitation and confusion.   All other systems reviewed and are negative.      Objective   Physical Exam   Constitutional: He is oriented to person, place, and time. He appears well-developed and well-nourished.   HENT:    Head: Normocephalic.   Eyes: Conjunctivae are normal.   Neck: Normal range of motion. Neck supple. No JVD present. No thyromegaly present.   Cardiovascular: Normal rate and normal heart sounds.    Pulmonary/Chest: Effort normal and breath sounds normal.   Musculoskeletal: He exhibits no edema.   Neurological: He is alert and oriented to person, place, and time.   Skin: Skin is warm and dry.   Chronic skin and venous changes lower extremities bilaterally   Psychiatric: He has a normal mood and affect. His behavior is normal. Judgment and thought content normal.   Nursing note and vitals reviewed.      Assessment/Plan   Imtiaz was seen today for [facility] follow up for urinary retention.    Diagnoses and all orders for this visit:    Chronic coronary artery disease    Essential hypertension    Type 2 diabetes mellitus with hyperglycemia, without long-term current use of insulin

## 2017-04-28 NOTE — PROGRESS NOTES
Chief Complaint   Patient presents with   • Anemia   • gastric ulcer     Subjective     HPI  Imtiaz Jones is a 88 y.o. male who presents For a follow-up after hospitalization in February 2017 with DAVEY and heme positive stools while on anticoagulation.  EGD and colonoscopy performed by Dr. Rosado.  EGD with non-bleeding gastric ulcer, colonoscopy with angiodysplastic lesion nonbleeding and TVA polyp.  Patient was discharged off of all anticoagulation and placed on iron supplementation his hemoglobin was last checked April 12 10.5 compared to 8.8 during admission.  He is currently taking Protonix 40 mg twice a day.  He denies black stool, blood in his stool, hematemesis, abdominal pain, weight loss, fever, chills, anorexia, early satiety, GERD symptoms.  Dr. Rosado no indication for repeating EGD to evaluate healing of gastric ulcer.  Patient has plans to repeat labs with her very carefully provider in 2 months.  Consider Repeat colonoscopy recommended in 3 years based on age, risk factors and comorbidities.     Past Medical History:   Diagnosis Date   • Acute arthritis    • Acute renal insufficiency    • Anemia    • Angiodysplasia     of colon   • Atherosclerosis of native coronary artery of native heart without angina pectoris    • Atopic dermatitis    • BPH (benign prostatic hyperplasia)    • Cardiomyopathy 3/20/2017   • Carotid artery disease     HAD ABBEY ENDARTERECTOMY   • CKD (chronic kidney disease)    • Coronary artery disease    • Diabetes mellitus, type II     TYPE 2   • Edema    • Elbow pain    • Gout    • Hematuria     HISTORY REASON WHY SURGERY WAS CANCELED 2 WEEKS AGO   • Hyperlipidemia    • Hypertension     OFF MEDICATION FOR NOW.    • Joint effusion of elbow    • Joint effusion of elbow    • Lightheadedness    • Malignant neoplasm of bladder     ALSO CANCER ON LT LEG   • Near syncope    • Nephrolithiasis    • Neuralgia    • Psychogenic dysuria    • Pulmonary HTN 3/20/2017   • Renal insufficiency  syndrome    • Skin avulsion    • Urinary retention    • UTI (urinary tract infection)      Past Surgical History:   Procedure Laterality Date   • CAROTID ENDARTERECTOMY Bilateral    • CATARACT EXTRACTION Bilateral    • COLONOSCOPY      Complete   • COLONOSCOPY N/A 2/15/2017    Procedure: COLONOSCOPY to cecum and TI with cold and hot snare polypectomies;  Surgeon: Jae Rosado MD;  Location: [facility] ENDOSCOPY;  Service:    • CORONARY ARTERY BYPASS GRAFT  1980'S & 2003    2 different surgeries BOTH SURGERY 3 VESSELS DONE   • ENDOSCOPY N/A 2/15/2017    Procedure: ESOPHAGOGASTRODUODENOSCOPY with biopsies;  Surgeon: Jae Rosado MD;  Location: [facility] ENDOSCOPY;  Service:    • GALLBLADDER SURGERY     • PA DRAIN LOWER LEG DEEP ABSC/HEMATOMA Left 7/14/2016    Procedure: LOWER EXTREMITY DEBRIDEMENT, EXCISION OF LT LEG SKIN TUMOR WITH FULL THICKNESS SKIN GRAFT RECONSTRUCTION;  Surgeon: Molina Jackson MD;  Location: [facility] OR;  Service: Plastics   • TRANSURETHRAL RESECTION OF BLADDER TUMOR N/A 4/10/2017    Procedure: CYSTOSCOPY WITH CLOT EVACUATION;  Surgeon: Vel Rogel MD;  Location: [facility] OR;  Service:        Social History     Social History   • Marital status:      Spouse name: N/A   • Number of children: 4   • Years of education: N/A     Occupational History   • retired      Social History Main Topics   • Smoking status: Former Smoker     Packs/day: 0.50     Years: 30.00     Types: Cigarettes, Pipe, Cigars   • Smokeless tobacco: Never Used      Comment: 25 YR AGO   • Alcohol use No      Comment: caffeine use   • Drug use: No   • Sexual activity: Not Asked     Other Topics Concern   • None     Social History Narrative         Current Outpatient Prescriptions:   •  allopurinol (ZYLOPRIM) 100 MG tablet, Take 1 tablet by mouth Daily., Disp: 90 tablet, Rfl: 0  •  ferrous sulfate 325 (65 FE) MG tablet, Take 1 tablet by mouth 3 (Three) Times a Day With Meals., Disp: 90 tablet, Rfl: 0  •   finasteride (PROSCAR) 5 MG tablet, Take 1 tablet by mouth daily., Disp: , Rfl:   •  furosemide (LASIX) 20 MG tablet, Take 20 mg by mouth Daily., Disp: , Rfl:   •  lisinopril (PRINIVIL,ZESTRIL) 20 MG tablet, Take 0.5 tablets by mouth Daily., Disp: 90 tablet, Rfl: 1  •  O2 (OXYGEN), Inhale As Needed., Disp: , Rfl:   •  pantoprazole (PROTONIX) 40 MG EC tablet, Take 1 tablet by mouth 2 (Two) Times a Day Before Meals., Disp: 60 tablet, Rfl: 1  •  simvastatin (ZOCOR) 80 MG tablet, TAKE 1 TABLET DAILY IN THE EVENING, Disp: 90 tablet, Rfl: 2  •  tamsulosin (FLOMAX) 0.4 MG capsule, Take 1 capsule by mouth every night., Disp: , Rfl:     Review of Systems   Constitutional: Negative for appetite change, fatigue and unexpected weight change.   HENT: Negative for trouble swallowing.    Respiratory: Negative for choking.    Cardiovascular: Negative for chest pain.   Gastrointestinal: Negative for abdominal distention, abdominal pain, blood in stool, constipation, diarrhea, nausea and vomiting.   Musculoskeletal: Negative for back pain.   Skin: Negative for pallor.   Neurological: Negative for dizziness.       Objective   Vitals:    04/28/17 1320   BP: 124/68       Physical Exam   Constitutional: He is oriented to person, place, and time. He appears well-developed and well-nourished.   HENT:   Head: Normocephalic and atraumatic.   Eyes: Conjunctivae and EOM are normal.   Neck: Normal range of motion. No tracheal deviation present.   Cardiovascular: Normal rate and regular rhythm.    Pulmonary/Chest: Effort normal and breath sounds normal. No respiratory distress.   Abdominal: Soft. Bowel sounds are normal. He exhibits no distension and no mass. There is no tenderness. There is no rebound and no guarding.   Musculoskeletal: Normal range of motion.   Neurological: He is alert and oriented to person, place, and time.   Skin: Skin is warm and dry.   Psychiatric: He has a normal mood and affect. Judgment normal.   Nursing note and  vitals reviewed.      Assessment/Plan   Imtiaz was seen today for anemia and gastric ulcer.    Diagnoses and all orders for this visit:    Iron deficiency anemia due to chronic blood loss    Colon polyps  Comments:  TVA - consider repeating at age 89yo given risks     Please call with results of labs from PCP in 2 months.  We will discuss at that time possibly decreasing his Protonix based on results.  Continue twice a day Protonix until that time.  Continue iron supplementation and have labs checked by PCP as planned.  We discussed alarm symptoms prompting him to go to the emergency room immediately.  For any additional questions, concerns or changes to your condition after today's office visit please contact the office at [phone].        Natali Burk APRCRISTHIAN   [facility] [place], operated by [facility]  [address] [place] [address]  Office: [phone]

## 2017-06-20 PROBLEM — Z00.00 MEDICARE ANNUAL WELLNESS VISIT, INITIAL: Status: ACTIVE | Noted: 2017-01-01

## 2017-06-20 NOTE — PROGRESS NOTES
QUICK REFERENCE INFORMATION:  The ABCs of the Annual Wellness Visit    Initial Medicare Wellness Visit    HEALTH RISK ASSESSMENT    12/28/1928    Recent Hospitalizations:  Recently treated at the following:  [facility].        Current Medical Providers:  Patient Care Team:  Charles Rose MD as PCP - General (Family Medicine)  Charles Rose MD as PCP - Claims Attributed        Smoking Status:  History   Smoking Status   • Former Smoker   • Packs/day: 0.50   • Years: 30.00   • Types: Cigarettes, Pipe, Cigars   Smokeless Tobacco   • Never Used     Comment: 25 YR AGO       Alcohol Consumption:  History   Alcohol Use No     Comment: caffeine use       Depression Screen:   PHQ-9 Depression Screening 6/20/2017   Little interest or pleasure in doing things 0   Feeling down, depressed, or hopeless 0   Trouble falling or staying asleep, or sleeping too much -   Feeling tired or having little energy -   Poor appetite or overeating -   Feeling bad about yourself - or that you are a failure or have let yourself or your family down -   Trouble concentrating on things, such as reading the newspaper or watching television -   Moving or speaking so slowly that other people could have noticed. Or the opposite - being so fidgety or restless that you have been moving around a lot more than usual -   Thoughts that you would be better off dead, or of hurting yourself in some way -   PHQ-9 Total Score 0       Health Habits and Functional and Cognitive Screening:  Functional & Cognitive Status 6/20/2017   Do you have difficulty preparing food and eating? No   Do you have difficulty bathing yourself? No   Do you have difficulty getting dressed? No   Do you have difficulty using the toilet? No   Do you have difficulty moving around from place to place? No   In the past year have you fallen or experienced a near fall? Yes   Do you need help using the phone?  No   Are you deaf or do you have serious difficulty hearing?  No    Do you need help with transportation? No   Do you need help shopping? No   Do you need help preparing meals?  No   Do you need help with housework?  No   Do you need help with laundry? No   Do you need help taking your medications? No   Do you need help managing money? No   Do you have difficulty concentrating, remembering or making decisions? No       Health Habits  Current Diet: Low Carb Diet  Dental Exam: Up to date  Eye Exam: Not up to date  Exercise (times per week): 2 times per week  Current Exercise Activities Include: Housecleaning          Does the patient have evidence of cognitive impairment? No    Asiprin use counseling: Contraindicated from taking ASA      Recent Lab Results:    Visual Acuity:  No exam data present    Age-appropriate Screening Schedule:  Refer to the list below for future screening recommendations based on patient's age, sex and/or medical conditions. Orders for these recommended tests are listed in the plan section. The patient has been provided with a written plan.    Health Maintenance   Topic Date Due   • DIABETIC FOOT EXAM  03/22/2016   • LIPID PANEL  03/22/2017   • DIABETIC EYE EXAM  05/01/2017   • INFLUENZA VACCINE  08/01/2017   • HEMOGLOBIN A1C  10/09/2017   • TDAP/TD VACCINES (2 - Td) 04/22/2026   • PNEUMOCOCCAL VACCINES (65+ LOW/MEDIUM RISK)  Excluded   • ZOSTER VACCINE  Excluded        Subjective   History of Present Illness    Imtiaz Jones is a 88 y.o. male who presents for an Annual Wellness Visit.    The following portions of the patient's history were reviewed and updated as appropriate: allergies, current medications, past family history, past medical history, past social history, past surgical history and problem list.    Outpatient Medications Prior to Visit   Medication Sig Dispense Refill   • allopurinol (ZYLOPRIM) 100 MG tablet TAKE 1 TABLET DAILY 90 tablet 0   • ferrous sulfate 325 (65 FE) MG tablet Take 1 tablet by mouth 3 (Three) Times a Day With Meals. 90  "tablet 0   • finasteride (PROSCAR) 5 MG tablet Take 1 tablet by mouth daily.     • furosemide (LASIX) 20 MG tablet TAKE 1 TABLET DAILY 90 tablet 0   • lisinopril (PRINIVIL,ZESTRIL) 20 MG tablet Take 0.5 tablets by mouth Daily. 90 tablet 1   • O2 (OXYGEN) Inhale As Needed.     • pantoprazole (PROTONIX) 40 MG EC tablet TAKE ONE TABLET BY MOUTH TWICE A DAY WITH MEALS 60 tablet 2   • simvastatin (ZOCOR) 80 MG tablet TAKE 1 TABLET DAILY IN THE EVENING 90 tablet 2   • tamsulosin (FLOMAX) 0.4 MG capsule Take 1 capsule by mouth every night.       No facility-administered medications prior to visit.        Patient Active Problem List   Diagnosis   • Calculus of kidney   • Hypertension   • Hyperlipidemia   • Gout   • Type 2 diabetes mellitus with hyperglycemia   • Atherosclerosis of coronary artery   • Malignant neoplasm of urinary bladder   • Hyperglycemia   • Microscopic hematuria   • Chronic kidney disease, stage 3   • Iron deficiency anemia due to chronic blood loss   • BPH (benign prostatic hyperplasia)   • Colon polyps   • Diverticulosis   • Angiodysplasia of colon   • Atrial fibrillation   • Chronic combined systolic and diastolic heart failure   • Pulmonary HTN   • Cardiomyopathy   • Chronic coronary artery disease   • Medicare annual wellness visit, initial       Advance Care Planning:  has NO advance directive - information provided to the patient today    Identification of Risk Factors:  Risk factors include: cardiovascular risk and anemia.    Review of Systems    Compared to one year ago, the patient feels his physical health is the same.  Compared to one year ago, the patient feels his mental health is the same.    Objective     Physical Exam    Vitals:    06/20/17 1244   BP: 130/52   BP Location: Right arm   Patient Position: Sitting   Cuff Size: Adult   Pulse: 70   Temp: 97.7 °F (36.5 °C)   TempSrc: Oral   SpO2: 97%   Weight: 165 lb 6.4 oz (75 kg)   Height: 70\" (177.8 cm)   PainSc: 0-No pain       Body mass " index is 23.73 kg/(m^2).  Discussed the patient's BMI with him. The BMI is in the acceptable range.    Assessment/Plan   Patient Self-Management and Personalized Health Advice  The patient has been provided with information about: fall prevention and designing advance directives and preventive services including:   · Advance directive, Glaucoma screening recommended, Pneumococcal vaccine , Zostavax vaccine (Herpes Zoster).    Visit Diagnoses:    ICD-10-CM ICD-9-CM   1. Medicare annual wellness visit, initial Z00.00 V70.0   2. Type 2 diabetes mellitus with hyperglycemia, without long-term current use of insulin E11.65 250.00     790.29   3. Essential hypertension I10 401.9   4. Chronic kidney disease, stage 3 N18.3 585.3       No orders of the defined types were placed in this encounter.      Outpatient Encounter Prescriptions as of 6/20/2017   Medication Sig Dispense Refill   • allopurinol (ZYLOPRIM) 100 MG tablet TAKE 1 TABLET DAILY 90 tablet 0   • ferrous sulfate 325 (65 FE) MG tablet Take 1 tablet by mouth 3 (Three) Times a Day With Meals. 90 tablet 0   • finasteride (PROSCAR) 5 MG tablet Take 1 tablet by mouth daily.     • furosemide (LASIX) 20 MG tablet TAKE 1 TABLET DAILY 90 tablet 0   • lisinopril (PRINIVIL,ZESTRIL) 20 MG tablet Take 0.5 tablets by mouth Daily. 90 tablet 1   • O2 (OXYGEN) Inhale As Needed.     • pantoprazole (PROTONIX) 40 MG EC tablet TAKE ONE TABLET BY MOUTH TWICE A DAY WITH MEALS 60 tablet 2   • simvastatin (ZOCOR) 80 MG tablet TAKE 1 TABLET DAILY IN THE EVENING 90 tablet 2   • tamsulosin (FLOMAX) 0.4 MG capsule Take 1 capsule by mouth every night.       No facility-administered encounter medications on file as of 6/20/2017.        Reviewed use of high risk medication in the elderly: not applicable  Reviewed for potential of harmful drug interactions in the elderly: not applicable    Follow Up:  Chronic problems  If changed mind about immunizations  An After Visit Summary and PPPS with all  of these plans were given to the patient.

## 2017-06-20 NOTE — PROGRESS NOTES
"Imtiaz Jones is a 88 y.o. male.  Seen 06/20/2017    Assessment/Plan   Problem List Items Addressed This Visit        Cardiovascular and Mediastinum    Hypertension (Chronic)    Relevant Orders    Comprehensive Metabolic Panel    Lipid Panel    Chronic coronary artery disease       Endocrine    Type 2 diabetes mellitus with hyperglycemia    Relevant Orders    Hemoglobin A1c    Lipid Panel       Genitourinary    Chronic kidney disease, stage 3    Relevant Orders    CBC & Differential       Other    Medicare annual wellness visit, initial - Primary           Follow-up as needed results of blood work and in 3-6 months continue present management of chronic medical problems as presently prescribed    Vitals:    06/20/17 1244   BP: 130/52   BP Location: Right arm   Patient Position: Sitting   Cuff Size: Adult   Pulse: 70   Temp: 97.7 °F (36.5 °C)   TempSrc: Oral   SpO2: 97%   Weight: 165 lb 6.4 oz (75 kg)   Height: 70\" (177.8 cm)     Body mass index is 23.73 kg/(m^2).    Subjective     Chief Complaint   Patient presents with   • follow up to hypertension   • follow up to diabetes   • patient only using oxygen at night and would like to stop al   • Initial Medicare Wellness     Social History   Substance Use Topics   • Smoking status: Former Smoker     Packs/day: 0.50     Years: 30.00     Types: Cigarettes, Pipe, Cigars   • Smokeless tobacco: Never Used      Comment: 25 YR AGO   • Alcohol use No      Comment: caffeine use       History of Present Illness     The following portions of the patient's history were reviewed and updated as appropriate:PMHroutine: Social history , Past Medical History, Allergies, Current Medications, Active Problem List and Health Maintenance    Review of Systems   Constitutional: Negative for activity change, appetite change and unexpected weight change.   HENT: Negative.  Negative for nosebleeds and trouble swallowing.    Eyes: Negative for pain and visual disturbance.   Respiratory: " Negative for apnea, cough, choking, chest tightness, shortness of breath, wheezing and stridor.    Cardiovascular: Negative for chest pain, palpitations and leg swelling.   Gastrointestinal: Negative for abdominal pain, blood in stool, constipation and diarrhea.   Endocrine: Negative.    Genitourinary: Negative for difficulty urinating and hematuria.   Musculoskeletal: Negative for joint swelling.   Skin: Negative for color change and rash.   Allergic/Immunologic: Negative.    Neurological: Negative for syncope and speech difficulty.   Hematological: Negative for adenopathy.   Psychiatric/Behavioral: Negative for agitation and confusion.   All other systems reviewed and are negative.      Objective   Physical Exam   Constitutional: He is oriented to person, place, and time. He appears well-developed and well-nourished.   HENT:   Head: Normocephalic.   Eyes: Conjunctivae are normal.   Neck: Normal range of motion. Neck supple. No JVD present. No thyromegaly present.   Bilateral scars   Cardiovascular: Normal rate.    Murmur heard.  Pulmonary/Chest: Effort normal and breath sounds normal.   Musculoskeletal: He exhibits no edema.    Imtiaz had a diabetic foot exam performed today.   During the foot exam he had a monofilament test performed.    Neurological Sensory Findings - Unaltered hot/cold right ankle/foot discrimination and unaltered hot/cold left ankle/foot discrimination. Unaltered sharp/dull right ankle/foot discrimination and unaltered sharp/dull left ankle/foot discrimination.    Vascular Status -  His exam exhibits right foot vasculature normal. His exam exhibits left foot vasculature normal. His exam exhibits no left foot edema.   Skin Integrity  -  His right foot skin is not intact.   Imtiaz's left foot skin is not intact. .  Neurological: He is alert and oriented to person, place, and time.   Skin: Skin is warm and dry.   Chronic skin and venous changes lower extremities bilaterally   Psychiatric: He  has a normal mood and affect. His behavior is normal. Judgment and thought content normal.   Nursing note and vitals reviewed.    Reviewed Data:  No visits with results within 1 Month(s) from this visit.  Latest known visit with results is:    Admission on 04/08/2017, Discharged on 04/13/2017   Component Date Value Ref Range Status   • Color, UA 04/08/2017 Red* Yellow, Straw Final    Any Substance that causes an abnormal urine color can alter the accuracy of the chemical reactions.   • Appearance, UA 04/08/2017 Cloudy* Clear Final   • pH, UA 04/08/2017 6.0  5.0 - 8.0 Final   • Specific Gravity, UA 04/08/2017 <=1.005  1.005 - 1.030 Final   • Glucose, UA 04/08/2017 Negative  Negative Final   • Ketones, UA 04/08/2017 15 mg/dL (1+)* Negative Final   • Bilirubin, UA 04/08/2017 Negative  Negative Final   • Blood, UA 04/08/2017 Large (3+)* Negative Final   • Protein, UA 04/08/2017 100 mg/dL (2+)* Negative Final   • Leuk Esterase, UA 04/08/2017 Moderate (2+)* Negative Final   • Nitrite, UA 04/08/2017 Negative  Negative Final   • Urobilinogen, UA 04/08/2017 0.2 E.U./dL  0.2 - 1.0 E.U./dL Final   • RBC, UA 04/08/2017 Too Numerous to Count* None Seen, 0-2 /HPF Final   • WBC, UA 04/08/2017 Unable to determine due to loaded field* None Seen, 0-2 /HPF Final   • Bacteria, UA 04/08/2017 Unable to determine due to loaded field* None Seen /HPF Final   • Squamous Epithelial Cells, UA 04/08/2017 Unable to determine due to loaded field* None Seen, 0-2 /HPF Final   • Hyaline Casts, UA 04/08/2017 Unable to determine due to loaded field  None Seen /LPF Final   • Methodology 04/08/2017 Manual Light Microscopy   Final   • Urine Culture 04/08/2017 No growth   Final   • Glucose 04/08/2017 110* 65 - 99 mg/dL Final   • BUN 04/08/2017 28* 8 - 23 mg/dL Final   • Creatinine 04/08/2017 1.33* 0.76 - 1.27 mg/dL Final   • Sodium 04/08/2017 142  136 - 145 mmol/L Final   • Potassium 04/08/2017 4.5  3.5 - 5.2 mmol/L Final   • Chloride 04/08/2017 102  98 -  107 mmol/L Final   • CO2 04/08/2017 25.7  22.0 - 29.0 mmol/L Final   • Calcium 04/08/2017 9.7  8.6 - 10.5 mg/dL Final   • eGFR Non African Amer 04/08/2017 51* >60 mL/min/1.73 Final   • BUN/Creatinine Ratio 04/08/2017 21.1  7.0 - 25.0 Final   • Anion Gap 04/08/2017 14.3  mmol/L Final   • Protime 04/08/2017 14.7* 11.7 - 14.2 Seconds Final   • INR 04/08/2017 1.19* 0.90 - 1.10 Final   • ABO Type 04/08/2017 A   Final   • RH type 04/08/2017 Negative   Final   • Antibody Screen 04/08/2017 Negative   Final   • WBC 04/08/2017 6.60  4.50 - 10.70 10*3/mm3 Final   • RBC 04/08/2017 3.63* 4.60 - 6.00 10*6/mm3 Final   • Hemoglobin 04/08/2017 9.1* 13.7 - 17.6 g/dL Final   • Hematocrit 04/08/2017 29.4* 40.4 - 52.2 % Final   • MCV 04/08/2017 81.0  79.8 - 96.2 fL Final   • MCH 04/08/2017 25.1* 27.0 - 32.7 pg Final   • MCHC 04/08/2017 31.0* 32.6 - 36.4 g/dL Final   • RDW 04/08/2017 23.5* 11.5 - 14.5 % Final   • RDW-SD 04/08/2017 69.1* 37.0 - 54.0 fl Final   • MPV 04/08/2017 9.9  6.0 - 12.0 fL Final   • Platelets 04/08/2017 201  140 - 500 10*3/mm3 Final   • Neutrophil % 04/08/2017 71.6  42.7 - 76.0 % Final   • Lymphocyte % 04/08/2017 21.1  19.6 - 45.3 % Final   • Monocyte % 04/08/2017 6.2  5.0 - 12.0 % Final   • Eosinophil % 04/08/2017 0.9  0.3 - 6.2 % Final   • Basophil % 04/08/2017 0.2  0.0 - 1.5 % Final   • Immature Grans % 04/08/2017 0.0  0.0 - 0.5 % Final   • Neutrophils, Absolute 04/08/2017 4.73  1.90 - 8.10 10*3/mm3 Final   • Lymphocytes, Absolute 04/08/2017 1.39  0.90 - 4.80 10*3/mm3 Final   • Monocytes, Absolute 04/08/2017 0.41  0.20 - 1.20 10*3/mm3 Final   • Eosinophils, Absolute 04/08/2017 0.06  0.00 - 0.70 10*3/mm3 Final   • Basophils, Absolute 04/08/2017 0.01  0.00 - 0.20 10*3/mm3 Final   • Immature Grans, Absolute 04/08/2017 0.00  0.00 - 0.03 10*3/mm3 Final   • Anti-D 04/08/2017 ANTI-D   Final   • Product Code 04/10/2017 P3196E64   Final   • Unit Number 04/10/2017 [id number]   Final   • UNIT  ABO 04/10/2017 A   Final    • UNIT  RH 04/10/2017 NEG   Final   • CROSSMATCH 1 INTERPRETATION 04/10/2017 Compatible   Final   • Dispense Status 04/10/2017 PT   Final   • Blood Type 04/10/2017 ANEG   Final   • Blood Expiration Date 04/10/2017 [id number]   Final   • Blood Type Barcode 04/10/2017 0600   Final   • Product Code 04/10/2017 [id number]   Final   • Unit Number 04/10/2017 [id number]   Final   • UNIT  ABO 04/10/2017 A   Final   • UNIT  RH 04/10/2017 NEG   Final   • CROSSMATCH 1 INTERPRETATION 04/10/2017 Compatible   Final   • Dispense Status 04/10/2017 PT   Final   • Blood Type 04/10/2017 ANEG   Final   • Blood Expiration Date 04/10/2017 [id number]   Final   • Blood Type Barcode 04/10/2017 0600   Final   • Glucose 04/08/2017 104  70 - 130 mg/dL Final   • Glucose 04/08/2017 132* 70 - 130 mg/dL Final   • Glucose 04/09/2017 122* 65 - 99 mg/dL Final   • BUN 04/09/2017 27* 8 - 23 mg/dL Final   • Creatinine 04/09/2017 1.35* 0.76 - 1.27 mg/dL Final   • Sodium 04/09/2017 143  136 - 145 mmol/L Final   • Potassium 04/09/2017 4.2  3.5 - 5.2 mmol/L Final   • Chloride 04/09/2017 108* 98 - 107 mmol/L Final   • CO2 04/09/2017 24.6  22.0 - 29.0 mmol/L Final   • Calcium 04/09/2017 8.7  8.6 - 10.5 mg/dL Final   • eGFR Non African Amer 04/09/2017 50* >60 mL/min/1.73 Final   • BUN/Creatinine Ratio 04/09/2017 20.0  7.0 - 25.0 Final   • Anion Gap 04/09/2017 10.4  mmol/L Final   • WBC 04/09/2017 6.36  4.50 - 10.70 10*3/mm3 Final   • RBC 04/09/2017 2.94* 4.60 - 6.00 10*6/mm3 Final   • Hemoglobin 04/09/2017 7.4* 13.7 - 17.6 g/dL Final   • Hematocrit 04/09/2017 24.6* 40.4 - 52.2 % Final   • MCV 04/09/2017 83.7  79.8 - 96.2 fL Final   • MCH 04/09/2017 25.2* 27.0 - 32.7 pg Final   • MCHC 04/09/2017 30.1* 32.6 - 36.4 g/dL Final   • RDW 04/09/2017 23.7* 11.5 - 14.5 % Final   • RDW-SD 04/09/2017 71.2* 37.0 - 54.0 fl Final   • MPV 04/09/2017 9.9  6.0 - 12.0 fL Final   • Platelets 04/09/2017 175  140 - 500 10*3/mm3 Final   • Hemoglobin A1C 04/09/2017 5.45  4.80  - 5.60 % Final   • Glucose 04/09/2017 125  70 - 130 mg/dL Final   • Glucose 04/09/2017 120  70 - 130 mg/dL Final   • Glucose 04/09/2017 126  70 - 130 mg/dL Final   • Glucose 04/09/2017 109  70 - 130 mg/dL Final   • Glucose 04/10/2017 162* 65 - 99 mg/dL Final   • BUN 04/10/2017 31* 8 - 23 mg/dL Final   • Creatinine 04/10/2017 1.33* 0.76 - 1.27 mg/dL Final   • Sodium 04/10/2017 143  136 - 145 mmol/L Final   • Potassium 04/10/2017 4.4  3.5 - 5.2 mmol/L Final   • Chloride 04/10/2017 106  98 - 107 mmol/L Final   • CO2 04/10/2017 22.7  22.0 - 29.0 mmol/L Final   • Calcium 04/10/2017 9.0  8.6 - 10.5 mg/dL Final   • eGFR Non African Amer 04/10/2017 51* >60 mL/min/1.73 Final   • BUN/Creatinine Ratio 04/10/2017 23.3  7.0 - 25.0 Final   • Anion Gap 04/10/2017 14.3  mmol/L Final   • WBC 04/10/2017 7.24  4.50 - 10.70 10*3/mm3 Final   • RBC 04/10/2017 3.79* 4.60 - 6.00 10*6/mm3 Final   • Hemoglobin 04/10/2017 9.9* 13.7 - 17.6 g/dL Final   • Hematocrit 04/10/2017 33.0* 40.4 - 52.2 % Final   • MCV 04/10/2017 87.1  79.8 - 96.2 fL Final   • MCH 04/10/2017 26.1* 27.0 - 32.7 pg Final   • MCHC 04/10/2017 30.0* 32.6 - 36.4 g/dL Final   • RDW 04/10/2017 21.2* 11.5 - 14.5 % Final   • RDW-SD 04/10/2017 67.2* 37.0 - 54.0 fl Final   • MPV 04/10/2017 10.6  6.0 - 12.0 fL Final   • Platelets 04/10/2017 151  140 - 500 10*3/mm3 Final   • Glucose 04/10/2017 162* 70 - 130 mg/dL Final   • Glucose 04/10/2017 171* 70 - 130 mg/dL Final   • Glucose 04/10/2017 145* 70 - 130 mg/dL Final   • Glucose 04/10/2017 156* 70 - 130 mg/dL Final   • Glucose 04/11/2017 142* 65 - 99 mg/dL Final   • BUN 04/11/2017 46* 8 - 23 mg/dL Final   • Creatinine 04/11/2017 1.47* 0.76 - 1.27 mg/dL Final   • Sodium 04/11/2017 141  136 - 145 mmol/L Final   • Potassium 04/11/2017 4.4  3.5 - 5.2 mmol/L Final   • Chloride 04/11/2017 106  98 - 107 mmol/L Final   • CO2 04/11/2017 19.8* 22.0 - 29.0 mmol/L Final   • Calcium 04/11/2017 8.7  8.6 - 10.5 mg/dL Final   • eGFR Non African Amer  04/11/2017 45* >60 mL/min/1.73 Final   • BUN/Creatinine Ratio 04/11/2017 31.3* 7.0 - 25.0 Final   • Anion Gap 04/11/2017 15.2  mmol/L Final   • WBC 04/11/2017 10.37  4.50 - 10.70 10*3/mm3 Final   • RBC 04/11/2017 3.81* 4.60 - 6.00 10*6/mm3 Final   • Hemoglobin 04/11/2017 10.1* 13.7 - 17.6 g/dL Final   • Hematocrit 04/11/2017 32.7* 40.4 - 52.2 % Final   • MCV 04/11/2017 85.8  79.8 - 96.2 fL Final   • MCH 04/11/2017 26.5* 27.0 - 32.7 pg Final   • MCHC 04/11/2017 30.9* 32.6 - 36.4 g/dL Final   • RDW 04/11/2017 21.4* 11.5 - 14.5 % Final   • RDW-SD 04/11/2017 66.5* 37.0 - 54.0 fl Final   • MPV 04/11/2017 11.0  6.0 - 12.0 fL Final   • Platelets 04/11/2017 139* 140 - 500 10*3/mm3 Final   • Glucose 04/11/2017 125  70 - 130 mg/dL Final   • Glucose 04/11/2017 119  70 - 130 mg/dL Final   • Glucose 04/11/2017 132* 70 - 130 mg/dL Final   • Glucose 04/11/2017 111  70 - 130 mg/dL Final   • Glucose 04/12/2017 113* 65 - 99 mg/dL Final   • BUN 04/12/2017 47* 8 - 23 mg/dL Final   • Creatinine 04/12/2017 1.50* 0.76 - 1.27 mg/dL Final   • Sodium 04/12/2017 143  136 - 145 mmol/L Final   • Potassium 04/12/2017 3.9  3.5 - 5.2 mmol/L Final   • Chloride 04/12/2017 107  98 - 107 mmol/L Final   • CO2 04/12/2017 24.3  22.0 - 29.0 mmol/L Final   • Calcium 04/12/2017 8.6  8.6 - 10.5 mg/dL Final   • eGFR Non African Amer 04/12/2017 44* >60 mL/min/1.73 Final   • BUN/Creatinine Ratio 04/12/2017 31.3* 7.0 - 25.0 Final   • Anion Gap 04/12/2017 11.7  mmol/L Final   • WBC 04/12/2017 9.16  4.50 - 10.70 10*3/mm3 Final   • RBC 04/12/2017 3.97* 4.60 - 6.00 10*6/mm3 Final   • Hemoglobin 04/12/2017 10.5* 13.7 - 17.6 g/dL Final   • Hematocrit 04/12/2017 34.3* 40.4 - 52.2 % Final   • MCV 04/12/2017 86.4  79.8 - 96.2 fL Final   • MCH 04/12/2017 26.4* 27.0 - 32.7 pg Final   • MCHC 04/12/2017 30.6* 32.6 - 36.4 g/dL Final   • RDW 04/12/2017 21.6* 11.5 - 14.5 % Final   • RDW-SD 04/12/2017 67.5* 37.0 - 54.0 fl Final   • MPV 04/12/2017 10.3  6.0 - 12.0 fL Final   •  Platelets 04/12/2017 131* 140 - 500 10*3/mm3 Final   • Glucose 04/12/2017 114  70 - 130 mg/dL Final   • Glucose 04/12/2017 140* 70 - 130 mg/dL Final   • Glucose 04/12/2017 114  70 - 130 mg/dL Final   • Glucose 04/12/2017 127  70 - 130 mg/dL Final   • Glucose 04/13/2017 113  70 - 130 mg/dL Final   • Glucose 04/13/2017 133* 70 - 130 mg/dL Final

## 2017-06-23 NOTE — TELEPHONE ENCOUNTER
----- Message from Charles Rose MD sent at 6/21/2017 12:45 PM EDT -----  In copies of blood work to gastroenterology has been monitoring his anemia also consultation with nephrology due to deteriorating renal status with heart failure

## 2017-08-13 NOTE — ED PROVIDER NOTES
EMERGENCY DEPARTMENT ENCOUNTER    CHIEF COMPLAINT  Chief Complaint: urinary retention  History given by: patient, spouse, daughter  History limited by: nothing  Room Number: 23/23  PMD: Charles Rose MD  Urologist- Dr. Rogel    HPI:  Pt is a 88 y.o. male with a history of bladder cancer, who presents complaining of difficulty urinating for the last day. Pt states that he last urinated last night around 2330. Pt also complains of lower abd pain, which improved after chung catheter placement, but denies hematuria. Pt states that he had similar symptoms about one year ago and needed a Chung catheter placed at that time. Pt is currently taking Prozcar and Flomax and denies missing any doses recently.    Duration: 1.5 days  Onset: gradual  Timing: constant  Location: urinary  Quality: retention  Intensity/Severity: moderate to severe  Progression: improved after Chung catheter placement  Associated Symptoms: lower abd pain  Aggravating Factors: none  Alleviating Factors: none  Previous Episodes: Pt states that he had similar symptoms about one year ago.   Treatment before arrival: none    PAST MEDICAL HISTORY  Active Ambulatory Problems     Diagnosis Date Noted   • Calculus of kidney 03/22/2016   • Hypertension 03/22/2016   • Hyperlipidemia 03/22/2016   • Gout 03/22/2016   • Type 2 diabetes mellitus with hyperglycemia 03/22/2016   • Atherosclerosis of coronary artery 03/22/2016   • Malignant neoplasm of urinary bladder 03/30/2016   • Hyperglycemia 04/03/2012   • Microscopic hematuria 02/13/2017   • Chronic kidney disease, stage 3 02/13/2017   • Iron deficiency anemia due to chronic blood loss 02/13/2017   • BPH (benign prostatic hyperplasia) 02/13/2017   • Colon polyps 02/16/2017   • Diverticulosis 02/16/2017   • Angiodysplasia of colon 02/16/2017   • Atrial fibrillation 02/16/2017   • 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   • Pulmonary HTN 03/20/2017   • Cardiomyopathy 03/20/2017   •  Chronic coronary artery disease 04/20/2017   • Medicare annual wellness visit, initial 06/20/2017     Resolved Ambulatory Problems     Diagnosis Date Noted   • Effusion of elbow 03/22/2016   • Acute renal impairment 03/22/2016   • Near syncope 02/12/2017   • Acute posthemorrhagic anemia 02/13/2017   • Acute respiratory failure with hypoxia 02/28/2017   • Pleural effusion, bilateral 03/17/2017   • Gross hematuria 04/08/2017     Past Medical History:   Diagnosis Date   • Acute arthritis    • Acute renal insufficiency    • Anemia    • Angiodysplasia    • Atherosclerosis of native coronary artery of native heart without angina pectoris    • Atopic dermatitis    • BPH (benign prostatic hyperplasia)    • Cardiomyopathy 3/20/2017   • Carotid artery disease    • CKD (chronic kidney disease)    • Coronary artery disease    • Diabetes mellitus, type II    • Edema    • Elbow pain    • Gout    • Hematuria    • Hyperlipidemia    • Hypertension    • Joint effusion of elbow    • Joint effusion of elbow    • Lightheadedness    • Malignant neoplasm of bladder    • Near syncope    • Nephrolithiasis    • Neuralgia    • Psychogenic dysuria    • Pulmonary HTN 3/20/2017   • Renal insufficiency syndrome    • Skin avulsion    • Urinary retention    • UTI (urinary tract infection)        PAST SURGICAL HISTORY  Past Surgical History:   Procedure Laterality Date   • CAROTID ENDARTERECTOMY Bilateral    • CATARACT EXTRACTION Bilateral    • COLONOSCOPY      Complete   • COLONOSCOPY N/A 2/15/2017    Procedure: COLONOSCOPY to cecum and TI with cold and hot snare polypectomies;  Surgeon: Jae Rosado MD;  Location: [facility] ENDOSCOPY;  Service:    • CORONARY ARTERY BYPASS GRAFT  1980'S & 2003    2 different surgeries BOTH SURGERY 3 VESSELS DONE   • ENDOSCOPY N/A 2/15/2017    Procedure: ESOPHAGOGASTRODUODENOSCOPY with biopsies;  Surgeon: Jae Rosado MD;  Location: [facility] ENDOSCOPY;  Service:    • GALLBLADDER SURGERY     • [place] DRAIN LOWER LEG  DEEP ABSC/HEMATOMA Left 7/14/2016    Procedure: LOWER EXTREMITY DEBRIDEMENT, EXCISION OF LT LEG SKIN TUMOR WITH FULL THICKNESS SKIN GRAFT RECONSTRUCTION;  Surgeon: Molina Jackson MD;  Location: [facility] OR;  Service: Plastics   • TRANSURETHRAL RESECTION OF BLADDER TUMOR N/A 4/10/2017    Procedure: CYSTOSCOPY WITH CLOT EVACUATION;  Surgeon: Vel Rogel MD;  Location: [facility] OR;  Service:        FAMILY HISTORY  Family History   Problem Relation Age of Onset   • Heart failure Mother      congestive   • Lung cancer Father    • Diabetes Brother      mellitus       SOCIAL HISTORY  Social History     Social History   • Marital status:      Spouse name: N/A   • Number of children: 4   • Years of education: N/A     Occupational History   • retired      Social History Main Topics   • Smoking status: Former Smoker     Packs/day: 0.50     Years: 30.00     Types: Cigarettes, Pipe, Cigars   • Smokeless tobacco: Never Used      Comment: 25 YR AGO   • Alcohol use No      Comment: caffeine use   • Drug use: No   • Sexual activity: Not on file     Other Topics Concern   • Not on file     Social History Narrative       ALLERGIES  Iodine and Keflex [cephalexin]    REVIEW OF SYSTEMS  Review of Systems   Constitutional: Negative for activity change, appetite change and fever.   HENT: Negative for congestion and sore throat.    Eyes: Negative.    Respiratory: Negative for cough and shortness of breath.    Cardiovascular: Negative for chest pain and leg swelling.   Gastrointestinal: Positive for abdominal pain (suprapubic). Negative for diarrhea and vomiting.   Endocrine: Negative.    Genitourinary: Positive for difficulty urinating. Negative for decreased urine volume and dysuria.   Musculoskeletal: Negative for neck pain.   Skin: Negative for rash and wound.   Allergic/Immunologic: Negative.    Neurological: Negative for weakness, numbness and headaches.   Hematological: Negative.    Psychiatric/Behavioral:  Negative.    All other systems reviewed and are negative.      PHYSICAL EXAM  ED Triage Vitals   Temp Heart Rate Resp BP SpO2   08/13/17 1357 08/13/17 1357 08/13/17 1357 08/13/17 1453 08/13/17 1357   98.1 °F (36.7 °C) 92 18 180/69 97 %      Temp src Heart Rate Source Patient Position BP Location FiO2 (%)   -- -- -- -- --              Physical Exam   Constitutional: He is oriented to person, place, and time and well-developed, well-nourished, and in no distress.   HENT:   Head: Normocephalic and atraumatic.   Eyes: EOM are normal. Pupils are equal, round, and reactive to light.   Neck: Normal range of motion. Neck supple.   Cardiovascular: Normal rate, regular rhythm and normal heart sounds.    Pulmonary/Chest: Effort normal and breath sounds normal. No respiratory distress.   O2=99% on RA   Abdominal: Soft. Bowel sounds are normal. There is no tenderness. There is no rebound and no guarding.   Genitourinary:   Genitourinary Comments: Flores catheter in place with 1000mL of clear urine in the bag   Musculoskeletal: Normal range of motion. He exhibits no edema.   Neurological: He is alert and oriented to person, place, and time. He has normal sensation and normal strength.   Skin: Skin is warm and dry.   Psychiatric: Mood and affect normal.   Nursing note and vitals reviewed.      LAB RESULTS  Lab Results (last 24 hours)     Procedure Component Value Units Date/Time    Basic Metabolic Panel [[id number]]  (Abnormal) Collected:  08/13/17 1518    Specimen:  Blood Updated:  08/13/17 1602     Glucose 109 (H) mg/dL      BUN 41 (H) mg/dL      Creatinine 1.49 (H) mg/dL      Sodium 143 mmol/L      Potassium 4.2 mmol/L      Chloride 104 mmol/L      CO2 22.9 mmol/L      Calcium 10.2 mg/dL      eGFR Non African Amer 45 (L) mL/min/1.73      BUN/Creatinine Ratio 27.5 (H)     Anion Gap 16.1 mmol/L     Narrative:       The MDRD GFR formula is only valid for adults with stable renal function between ages 18 and 70.    Urinalysis With /  Culture If Indicated [[id number]]  (Abnormal) Collected:  08/13/17 1522    Specimen:  Urine from Urine, Catheter Updated:  08/13/17 1547     Color, UA Yellow     Appearance, UA Clear     pH, UA 5.5     Specific Gravity, UA 1.010     Glucose, UA Negative     Ketones, UA Negative     Bilirubin, UA Negative     Blood, UA Large (3+) (A)     Protein, UA Negative     Leuk Esterase, UA Negative     Nitrite, UA Negative     Urobilinogen, UA 0.2 E.U./dL    Urinalysis, Microscopic Only [[id number]]  (Abnormal) Collected:  08/13/17 1522    Specimen:  Urine from Urine, Catheter Updated:  08/13/17 1547     RBC, UA 21-30 (A) /HPF      WBC, UA 0-2 /HPF      Bacteria, UA None Seen /HPF      Squamous Epithelial Cells, UA 0-2 /HPF      Hyaline Casts, UA 0-2 /LPF      Methodology Automated Microscopy          I ordered the above labs and reviewed the results    PROCEDURES  Procedures      PROGRESS AND CONSULTS  ED Course     1459- Pt has 1000mL of clear urine in bag after Chung catheter placement. Discussed the plan to order basic blood work prior to likely discharge. Pt and family agree with the plan and all questions were addressed.    1505- Ordered BMP and UA for further evaluation.     5:25 PM  Latest vital signs   BP- 180/69 HR- 92 Temp- 98.1 °F (36.7 °C) O2 sat- 97%    1727- Rechecked pt. Pt is resting comfortably. Notified pt and family of the pt's lab results, including his chronically elevated renal function. Discussed the plan to discharge the pt home with the chung catheter in place and instructed the pt to follow up with Dr. Rogel (urology). Pt and family agree with the plan and all questions were addressed.    MEDICAL DECISION MAKING  Results were reviewed/discussed with the patient and they were also made aware of online access. Pt also made aware that some labs, such as cultures, will not be resulted during ER visit and follow up with PMD is necessary.     MDM  Number of Diagnoses or Management Options     Amount  and/or Complexity of Data Reviewed  Clinical lab tests: ordered and reviewed (There is 3+ blood in the UA)  Decide to obtain previous medical records or to obtain history from someone other than the patient: yes  Obtain history from someone other than the patient: yes (family)    Patient Progress  Patient progress: stable         DIAGNOSIS  Final diagnoses:   Acute urinary retention   Chronic renal insufficiency, unspecified stage       DISPOSITION  DISCHARGE    Patient discharged in stable condition.    Reviewed implications of results, diagnosis, meds, responsibility to follow up, warning signs and symptoms of possible worsening, potential complications and reasons to return to ER, including fever, worsening pain or any concerns.    Patient/Family voiced understanding of above instructions.    Discussed plan for discharge, as there is no emergent indication for admission.  Pt/family is agreeable and understands need for follow up and repeat testing.  Pt is aware that discharge does not mean that nothing is wrong but it indicates no emergency is present that requires admission and they must continue care with follow-up as given below or physician of their choice.     FOLLOW-UP  Vel Rogel MD  [address] [facility] [address] [phone]    Schedule an appointment as soon as possible for a visit  Call in am on 8/14., Return if pain worsens, If symptoms worsen, shortness of breath, fever, any concerns    [facility] Emergency Department  [address] [facility] [address] [phone]  Go to  If symptoms worsen, fever, shortness of breath or any concerns         Medication List      Stop          furosemide 20 MG tablet   Commonly known as:  LASIX       lisinopril 20 MG tablet   Commonly known as:  PRINIVIL,ZESTRIL       O2   Commonly known as:  OXYGEN               Latest Documented Vital Signs:  As of 5:32 PM  BP- (!) 185/78 HR- 92 Temp- 98.1 °F (36.7 °C) O2 sat-  97%    --  Documentation assistance provided by carmen He for Dr. Johnson.  Information recorded by the scribjessica was done at my direction and has been verified and validated by me.     Falguni He  08/13/17 8631       Prashanth Johnson MD  08/13/17 6958

## 2017-08-13 NOTE — ED NOTES
Pt has hx of bladder CA and states that he hasnt voided since last night around 1130     Luisa Coburn, RN  08/13/17 4068

## 2017-08-14 NOTE — TELEPHONE ENCOUNTER
"ER F/U phone call:   Pt states that he is doing \"great\". To see his urologist on 8-17-17. No other questions or concerns voiced at this time. Jovanna Aviles RN    "

## 2017-08-24 NOTE — ED NOTES
Pt states that he has not be able to urinate since the cather was removed yesterday.      Chelsey Do RN  08/24/17 0651

## 2017-08-24 NOTE — ED PROVIDER NOTES
EMERGENCY DEPARTMENT ENCOUNTER    CHIEF COMPLAINT  Chief Complaint: urinary retention  History given by: patient, spouse  History limited by: nothing  Room Number: 16/16  PMD: Charles Rose MD      HPI:  Pt is a 88 y.o. male who presents complaining of urinary retention, which began last night. Pt denies CP, SOB, fever or additional complaint. Pt states that Dr. Rogel (urology) removed his chung catheter yesterday due to his history of urinary retention    Duration/Onset/Timing: one day  Location: urinary  Quality: retention  Intensity/Severity: moderate  Associated Symptoms: none  Aggravating or Alleviating Factors: none  Previous Episodes: Pt states that he has a history of similar symptoms and had his Chung catheter removed yesterday.      PAST MEDICAL HISTORY  Active Ambulatory Problems     Diagnosis Date Noted   • Calculus of kidney 03/22/2016   • Hypertension 03/22/2016   • Hyperlipidemia 03/22/2016   • Gout 03/22/2016   • Type 2 diabetes mellitus with hyperglycemia 03/22/2016   • Atherosclerosis of coronary artery 03/22/2016   • Malignant neoplasm of urinary bladder 03/30/2016   • Hyperglycemia 04/03/2012   • Microscopic hematuria 02/13/2017   • Chronic kidney disease, stage 3 02/13/2017   • Iron deficiency anemia due to chronic blood loss 02/13/2017   • BPH (benign prostatic hyperplasia) 02/13/2017   • Colon polyps 02/16/2017   • Diverticulosis 02/16/2017   • Angiodysplasia of colon 02/16/2017   • Atrial fibrillation 02/16/2017   • 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   • Pulmonary HTN 03/20/2017   • Cardiomyopathy 03/20/2017   • Chronic coronary artery disease 04/20/2017   • Medicare annual wellness visit, initial 06/20/2017     Resolved Ambulatory Problems     Diagnosis Date Noted   • Effusion of elbow 03/22/2016   • Acute renal impairment 03/22/2016   • Near syncope 02/12/2017   • Acute posthemorrhagic anemia 02/13/2017   • Acute respiratory failure with hypoxia  02/28/2017   • Pleural effusion, bilateral 03/17/2017   • Gross hematuria 04/08/2017     Past Medical History:   Diagnosis Date   • Acute arthritis    • Acute renal insufficiency    • Anemia    • Angiodysplasia    • Atherosclerosis of native coronary artery of native heart without angina pectoris    • Atopic dermatitis    • BPH (benign prostatic hyperplasia)    • Cardiomyopathy 3/20/2017   • Carotid artery disease    • CKD (chronic kidney disease)    • Coronary artery disease    • Diabetes mellitus, type II    • Edema    • Elbow pain    • Gout    • Hematuria    • Hyperlipidemia    • Hypertension    • Joint effusion of elbow    • Joint effusion of elbow    • Lightheadedness    • Malignant neoplasm of bladder    • Near syncope    • Nephrolithiasis    • Neuralgia    • Psychogenic dysuria    • Pulmonary HTN 3/20/2017   • Renal insufficiency syndrome    • Skin avulsion    • Urinary retention    • UTI (urinary tract infection)        PAST SURGICAL HISTORY  Past Surgical History:   Procedure Laterality Date   • CAROTID ENDARTERECTOMY Bilateral    • CATARACT EXTRACTION Bilateral    • COLONOSCOPY      Complete   • COLONOSCOPY N/A 2/15/2017    Procedure: COLONOSCOPY to cecum and TI with cold and hot snare polypectomies;  Surgeon: Jae Rosado MD;  Location: [facility] ENDOSCOPY;  Service:    • CORONARY ARTERY BYPASS GRAFT  1980'S & 2003    2 different surgeries BOTH SURGERY 3 VESSELS DONE   • ENDOSCOPY N/A 2/15/2017    Procedure: ESOPHAGOGASTRODUODENOSCOPY with biopsies;  Surgeon: Jae Rosado MD;  Location: [facility] ENDOSCOPY;  Service:    • GALLBLADDER SURGERY     • [place] DRAIN LOWER LEG DEEP ABSC/HEMATOMA Left 7/14/2016    Procedure: LOWER EXTREMITY DEBRIDEMENT, EXCISION OF LT LEG SKIN TUMOR WITH FULL THICKNESS SKIN GRAFT RECONSTRUCTION;  Surgeon: Molina Jackson MD;  Location: [facility] OR;  Service: Plastics   • TRANSURETHRAL RESECTION OF BLADDER TUMOR N/A 4/10/2017    Procedure: CYSTOSCOPY WITH CLOT EVACUATION;   Surgeon: Vel Rogel MD;  Location: [facility] OR;  Service:        FAMILY HISTORY  Family History   Problem Relation Age of Onset   • Heart failure Mother      congestive   • Lung cancer Father    • Diabetes Brother      mellitus       SOCIAL HISTORY  Social History     Social History   • Marital status:      Spouse name: N/A   • Number of children: 4   • Years of education: N/A     Occupational History   • retired      Social History Main Topics   • Smoking status: Former Smoker     Packs/day: 0.50     Years: 30.00     Types: Cigarettes, Pipe, Cigars   • Smokeless tobacco: Never Used      Comment: 25 YR AGO   • Alcohol use No      Comment: caffeine use   • Drug use: No   • Sexual activity: Not on file     Other Topics Concern   • Not on file     Social History Narrative       ALLERGIES  Iodine and Keflex [cephalexin]    REVIEW OF SYSTEMS  Review of Systems   Constitutional: Negative for fever.   Respiratory: Negative for shortness of breath.    Cardiovascular: Negative for chest pain.   Genitourinary: Positive for difficulty urinating (urinary retention).       PHYSICAL EXAM  ED Triage Vitals   Temp Heart Rate Resp BP SpO2   08/24/17 0627 08/24/17 0625 08/24/17 0625 08/24/17 0625 08/24/17 0625   97.1 °F (36.2 °C) 91 18 191/74 97 %      Temp src Heart Rate Source Patient Position BP Location FiO2 (%)   -- -- -- -- --              Physical Exam   Constitutional: No distress.   HENT:   Head: Normocephalic and atraumatic.   Cardiovascular: Regular rhythm.    Pulmonary/Chest: No respiratory distress.   Abdominal: There is no tenderness.   Genitourinary:   Genitourinary Comments: Flores catheter in place with over 700 cc of clear urine in the bag   Musculoskeletal: He exhibits no tenderness.   Skin: No rash noted.   Nursing note and vitals reviewed.      PROCEDURES  Procedures      PROGRESS AND CONSULTS  ED Course     0708- Per RN, the pt had over 700cc of urine removed after Flores catheter placement.  Discussed the plan to discharge the pt home with the Flores catheter in place. I instructed the pt to follow up with Dr. Rogel (urology). Pt and family agree with the plan and all questions were addressed.      MEDICAL DECISION MAKING  Results were reviewed/discussed with the patient and they were also made aware of online access. Pt also made aware that follow up with PMD is necessary.     MDM  Number of Diagnoses or Management Options     Amount and/or Complexity of Data Reviewed  Decide to obtain previous medical records or to obtain history from someone other than the patient: yes  Review and summarize past medical records: yes (Pt was last seen in the ED on 8-13-17 for urinary retention and had a Flores catheter placed)           DIAGNOSIS  Final diagnoses:   Urinary retention       DISPOSITION  DISCHARGE    Patient discharged in stable condition.    Reviewed implications of results, diagnosis, meds, responsibility to follow up, warning signs and symptoms of possible worsening, potential complications and reasons to return to ER, including fever, worsening pain or any concerns.    Patient/Family voiced understanding of above instructions.    Discussed plan for discharge, as there is no emergent indication for admission.  Pt/family is agreeable and understands need for follow up and repeat testing.  Pt is aware that discharge does not mean that nothing is wrong but it indicates no emergency is present that requires admission and they must continue care with follow-up as given below or physician of their choice.     FOLLOW-UP  Vel Rogel MD  [address] [facility] [address] [phone]    Schedule an appointment as soon as possible for a visit           Medication List      Notice     No changes were made to your prescriptions during this visit.            Latest Documented Vital Signs:  As of 7:14 AM  BP- (!) 191/74 HR- 91 Temp- 97.1 °F (36.2 °C) O2 sat- 97%    --  Documentation  assistance provided by carmen He for Dr. Sanches.  Information recorded by the scribe was done at my direction and has been verified and validated by me.     Falguni He  08/24/17 0714       Layton Sanches MD  08/24/17 0715

## 2017-08-24 NOTE — ED TRIAGE NOTES
Pt was here a week from this past Sunday for inability to void.  Chung catheter was placed at that time.  Saw Dr Carrillo yesterday and chung cath was removed around 1500.  Pt states he can void since removal

## 2017-09-01 NOTE — TELEPHONE ENCOUNTER
F/u phone call; spoke w/pt who reports feeling much better; F/u w/ urology on 8/29/17. No further needs at this time. Mary Arreola RN

## 2017-09-11 NOTE — OUTREACH NOTE
Pt. Continues to have a F/C, states urine is light yellow and empties the bag 3 x day. Teaching done on intake and s/s of UTI which he voices understanding. No other questions or concerns voiced at this time.

## 2017-09-19 NOTE — OUTREACH NOTE
Pt. Was seen by Dr. Terry 9/18, F/C was changed and was started on an antibiotic. Reviewed med and need to take with food and complete regimen. Reviewed s/s of UTI and water intake needed.No other questions or concerns voiced at this time.

## 2017-09-26 NOTE — PROGRESS NOTES
Subjective:     Encounter Date: 9/26/2017      Patient ID: Imtiaz Jones is a 88 y.o. male.    Chief Complaint:  History of Present Illness    Dear Dr. Rose,    I had the pleasure of seeing this patient back in the office today for follow-up of his cardiac status.  It is been 6 months since patient's last visit.      He has a history of GI bleed- he was admitted early 2017 when he found to be profoundly anemic with a GI bleed.  He had an EGD and colonoscopy that showed gastritis and colonic polyps.  He also had severe recurring hematuria and for both these reasons he was told that he could not take any anticoagulant indefinitely.  He has had previous problems with hematuria secondary to bladder cancer and has been off of aspirin because of recurring hematuria on aspirin; Dr. Rogel has told him in the past that he cannot take aspirin because of hematuria.  He was seen by cardiology while hospitalized.  He had an echocardiogram that showed an ejection fraction of 36% with severe pulmonary hypertension.  Afterload reduction and continued diuresis was recommended.  He also had grade 2 diastolic dysfunction.     When he was seen in March she was stable from cardiac standpoint.  However, he tells me that a couple months ago he was taken off lisinopril and taken off of diuretics by nephrology because of worsened renal function.  He thinks that his kidneys have been doing better since then.    He was on the lisinopril both because of hypertension as well as cardiomyopathy.  He states that his blood pressure has been running high.    He has a history of CAD and is status post CABG on 2 separate occasions.  He previously followed with Dr. Ghosh of [facility].   In September 2016 he had a Lexiscan Cardiolite that showed an ejection fraction of 46% and no evidence of ischemia.  He did have a bundle branch block contraction pattern at the time.        The following portions of the patient's history  were reviewed and updated as appropriate: allergies, current medications, past family history, past medical history, past social history, past surgical history and problem list.    Past Medical History:   Diagnosis Date   • Acute arthritis    • Acute renal insufficiency    • Anemia    • Angiodysplasia     of colon   • Atherosclerosis of native coronary artery of native heart without angina pectoris    • Atopic dermatitis    • BPH (benign prostatic hyperplasia)    • Cardiomyopathy 3/20/2017   • Carotid artery disease     HAD ABBEY ENDARTERECTOMY   • CKD (chronic kidney disease)    • Coronary artery disease    • Diabetes mellitus, type II     TYPE 2   • Edema    • Elbow pain    • Gout    • Hematuria     HISTORY REASON WHY SURGERY WAS CANCELED 2 WEEKS AGO   • Hyperlipidemia    • Hypertension     OFF MEDICATION FOR NOW.    • Joint effusion of elbow    • Lightheadedness    • Malignant neoplasm of bladder     ALSO CANCER ON LT LEG   • Near syncope    • Nephrolithiasis    • Neuralgia    • Psychogenic dysuria    • Pulmonary HTN 3/20/2017   • Renal insufficiency syndrome    • Skin avulsion    • Urinary retention    • UTI (urinary tract infection)        Past Surgical History:   Procedure Laterality Date   • CAROTID ENDARTERECTOMY Bilateral    • CATARACT EXTRACTION Bilateral    • COLONOSCOPY      Complete   • COLONOSCOPY N/A 2/15/2017    Procedure: COLONOSCOPY to cecum and TI with cold and hot snare polypectomies;  Surgeon: Jae Rosado MD;  Location: [facility] ENDOSCOPY;  Service:    • CORONARY ARTERY BYPASS GRAFT  1980'S & 2003    2 different surgeries BOTH SURGERY 3 VESSELS DONE   • ENDOSCOPY N/A 2/15/2017    Procedure: ESOPHAGOGASTRODUODENOSCOPY with biopsies;  Surgeon: Jae Rosado MD;  Location: [facility] ENDOSCOPY;  Service:    • GALLBLADDER SURGERY     • DE DRAIN LOWER LEG DEEP ABSC/HEMATOMA Left 7/14/2016    Procedure: LOWER EXTREMITY DEBRIDEMENT, EXCISION OF LT LEG SKIN TUMOR WITH FULL THICKNESS SKIN GRAFT  RECONSTRUCTION;  Surgeon: Molina Jackson MD;  Location: [facility];  Service: Plastics   • TRANSURETHRAL RESECTION OF BLADDER TUMOR N/A 4/10/2017    Procedure: CYSTOSCOPY WITH CLOT EVACUATION;  Surgeon: Vel Rogel MD;  Location: [facility] OR;  Service:        Social History     Social History   • Marital status:      Spouse name: N/A   • Number of children: 4   • Years of education: N/A     Occupational History   • retired      Social History Main Topics   • Smoking status: Former Smoker     Packs/day: 0.50     Years: 30.00     Types: Cigarettes, Pipe, Cigars   • Smokeless tobacco: Never Used      Comment: 25 YR AGO   • Alcohol use No      Comment: caffeine use   • Drug use: No   • Sexual activity: Not on file     Other Topics Concern   • Not on file     Social History Narrative       Review of Systems   Constitution: Negative for chills, decreased appetite and night sweats.   HENT: Negative for ear discharge, ear pain, hearing loss, nosebleeds and sore throat.    Eyes: Negative for blurred vision, double vision and pain.   Cardiovascular: Negative for cyanosis.   Respiratory: Negative for hemoptysis and sputum production.    Endocrine: Negative for cold intolerance and heat intolerance.   Hematologic/Lymphatic: Negative for adenopathy.   Skin: Negative for dry skin, itching, nail changes, rash and suspicious lesions.   Musculoskeletal: Negative for arthritis, gout, muscle cramps, muscle weakness, myalgias and neck pain.   Gastrointestinal: Negative for anorexia, constipation, diarrhea, dysphagia, hematemesis and jaundice.   Genitourinary: Negative for dysuria, flank pain, frequency, hematuria and nocturia.   Neurological: Negative for numbness, paresthesias and seizures.   Psychiatric/Behavioral: Negative for altered mental status, hallucinations, hypervigilance, suicidal ideas and thoughts of violence.   Allergic/Immunologic: Negative for persistent infections.         ECG 12  "Lead  Date/Time: 9/26/2017 12:18 PM  Performed by: SHARON JACKSON III.  Authorized by: SHARON JACKSON III   Comparison: compared with previous ECG   Similar to previous ECG  Rhythm: sinus rhythm  Rate: normal  Conduction: 1st degree  ST Segments: ST segments normal  T Waves: T waves normal  QRS axis: normal  Other: no other findings  Other findings: PRWP  Clinical impression: non-specific ECG               Objective:     Vitals:    09/26/17 1038   BP: 152/78   Pulse: 67   Weight: 163 lb (73.9 kg)   Height: 70\" (177.8 cm)         Physical Exam   Constitutional: He is oriented to person, place, and time. He appears well-developed and well-nourished. No distress.   HENT:   Head: Normocephalic and atraumatic.   Nose: Nose normal.   Mouth/Throat: Oropharynx is clear and moist.   Eyes: Conjunctivae and EOM are normal. Pupils are equal, round, and reactive to light. Right eye exhibits no discharge. Left eye exhibits no discharge.   Neck: Normal range of motion. Neck supple. No tracheal deviation present. No thyromegaly present.   Cardiovascular: Normal rate, regular rhythm, S1 normal, S2 normal, normal heart sounds and normal pulses.  Exam reveals no S3.    Pulmonary/Chest: Effort normal and breath sounds normal. No stridor. No respiratory distress. He exhibits no tenderness.   Abdominal: Soft. Bowel sounds are normal. He exhibits no distension and no mass. There is no tenderness. There is no rebound and no guarding.   Musculoskeletal: Normal range of motion. He exhibits no tenderness or deformity.   Lymphadenopathy:     He has no cervical adenopathy.   Neurological: He is alert and oriented to person, place, and time. He has normal reflexes.   Skin: Skin is warm and dry. No rash noted. He is not diaphoretic. No erythema.   Psychiatric: He has a normal mood and affect. Thought content normal.       Lab Review:                 Assessment:          Diagnosis Plan   1. Atherosclerosis of native coronary artery of native " heart without angina pectoris  ECG 12 Lead    hydrALAZINE (APRESOLINE) 25 MG tablet   2. Cardiomyopathy  ECG 12 Lead    hydrALAZINE (APRESOLINE) 25 MG tablet   3. Chronic combined systolic and diastolic heart failure  ECG 12 Lead    hydrALAZINE (APRESOLINE) 25 MG tablet   4. Chronic coronary artery disease     5. Mixed hyperlipidemia     6. Essential hypertension  hydrALAZINE (APRESOLINE) 25 MG tablet          Plan:       1. Heart Failure  Assessment  • NYHA class I - There is no limitation of physical activity. Physical activity does not cause fatigue, palpitations or shortness of breath.  • ACE inhibitor not prescribed for medical reasons  • Beta blocker not prescribed for medical reasons  • Diuretics not prescribed for medical reasons  • Angiotensin receptor blocker (ARB) not prescribed for medical reasons    Plan  • The patient has received heart failure education on the following topics: dietary sodium restriction, medication instructions, minimizing or avoiding NSAID use, symptom management, physical activity, weight monitoring and minimizing alcohol intake  • The heart failure care plan was discussed with the patient today including: up-titrating HF medications  • Since the patient was taken off of ACE  inhibition due to renal function we will initiate hydralazine.    Subjective/Objective    • Physical exam findings negative for S3 gallop.    2. Coronary Artery Disease  Assessment  • The patient has no angina    Plan  • Lifestyle modifications discussed include adhering to a heart healthy diet, avoidance of tobacco products, maintenance of a healthy weight, medication compliance, regular exercise and regular monitoring of cholesterol and blood pressure    Subjective - Objective  • There is a history of previous coronary artery bypass graft      3.  Hypertension-we'll follow his blood pressure with the adjustment of his hydralazine  4.  Hyperlipidemia-we will continue his lipid-lowering therapy  5.  History of  severe GI bleed earlier this year-as noted above patient is not a candidate for either antiplatelet or anticoagulant therapy.    Thank you very much for allowing us to participate in the care of this pleasant patient.  Please don't hesitate to call if I can be of assistance in any way.      Current Outpatient Prescriptions:   •  finasteride (PROSCAR) 5 MG tablet, Take 1 tablet by mouth daily., Disp: , Rfl:   •  IRON PO, Take  by mouth., Disp: , Rfl:   •  pantoprazole (PROTONIX) 40 MG EC tablet, Take 1 tablet by mouth Daily., Disp: 90 tablet, Rfl: 2  •  simvastatin (ZOCOR) 80 MG tablet, TAKE 1 TABLET DAILY IN THE EVENING, Disp: 90 tablet, Rfl: 2  •  tamsulosin (FLOMAX) 0.4 MG capsule, Take 1 capsule by mouth every night., Disp: , Rfl:

## 2017-10-17 NOTE — OUTREACH NOTE
Pt. Was seen by urologist 10/16 and had F/C changed. Plans are to change this every 30 days. Reviewed s/s of UTI No other questions or concerns voiced at this time. Reviewed Gaps in Care and need to schedule Annual Wellness Visit. Plans to flu shot tomorrow.

## 2017-10-21 NOTE — ED PROVIDER NOTES
" EMERGENCY DEPARTMENT ENCOUNTER    CHIEF COMPLAINT  Chief Complaint: Chung Catheter Problem  History given by: Patient, Spouse  History limited by: N/A  Room Number: 35/35  PMD: Charles Rose MD      HPI:  Pt reports that he has a urinary chung catheter in place chronically due to prior h/o bladder cancer. Pt reports that his chung catheter is replaced every three days and was last replaced about 2 days ago. Pt presents to the ED c/o decreased urinary output into the chung catheter since about 15:00 today. Pt states that he has also had lower abdominal pain, but denies nausea, vomiting, diarrhea, dyspnea, and chest pain. There are no other complaints at this time.      Dr. Rogel - urologist      Location: [facility]   Radiation: None  Quality: \"decreased urinary output into the chung catheter\"  Intensity/Severity: Moderate  Duration: Onset at about 15:00 today  Onset quality: Abrupt  Timing: Constant  Progression: Unchanged  Aggravating Factors: Nothing  Alleviating Factors: Nothing  Previous Episodes: None  Treatment before arrival: None  Associated Symptoms: Lower abd pain       PAST MEDICAL HISTORY  Active Ambulatory Problems     Diagnosis Date Noted   • Calculus of kidney 03/22/2016   • Hypertension 03/22/2016   • Hyperlipidemia 03/22/2016   • Gout 03/22/2016   • Type 2 diabetes mellitus with hyperglycemia 03/22/2016   • Atherosclerosis of coronary artery 03/22/2016   • Malignant neoplasm of urinary bladder 03/30/2016   • Hyperglycemia 04/03/2012   • Microscopic hematuria 02/13/2017   • Chronic kidney disease, stage 3 02/13/2017   • Iron deficiency anemia due to chronic blood loss 02/13/2017   • BPH (benign prostatic hyperplasia) 02/13/2017   • Colon polyps 02/16/2017   • Diverticulosis 02/16/2017   • Angiodysplasia of colon 02/16/2017   • Atrial fibrillation 02/16/2017   • 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   • Pulmonary HTN 03/20/2017   • Cardiomyopathy 03/20/2017   • Chronic " coronary artery disease 04/20/2017   • Medicare annual wellness visit, initial 06/20/2017     Resolved Ambulatory Problems     Diagnosis Date Noted   • Effusion of elbow 03/22/2016   • Acute renal impairment 03/22/2016   • Near syncope 02/12/2017   • Acute posthemorrhagic anemia 02/13/2017   • Acute respiratory failure with hypoxia 02/28/2017   • Pleural effusion, bilateral 03/17/2017   • Gross hematuria 04/08/2017     Past Medical History:   Diagnosis Date   • Acute arthritis    • Acute renal insufficiency    • Anemia    • Angiodysplasia    • Atherosclerosis of native coronary artery of native heart without angina pectoris    • Atopic dermatitis    • BPH (benign prostatic hyperplasia)    • Cardiomyopathy 3/20/2017   • Carotid artery disease    • CKD (chronic kidney disease)    • Coronary artery disease    • Diabetes mellitus, type II    • Edema    • Elbow pain    • Gout    • Hematuria    • Hyperlipidemia    • Hypertension    • Joint effusion of elbow    • Lightheadedness    • Malignant neoplasm of bladder    • Near syncope    • Nephrolithiasis    • Neuralgia    • Psychogenic dysuria    • Pulmonary HTN 3/20/2017   • Renal insufficiency syndrome    • Skin avulsion    • Urinary retention    • UTI (urinary tract infection)          PAST SURGICAL HISTORY  Past Surgical History:   Procedure Laterality Date   • CAROTID ENDARTERECTOMY Bilateral    • CATARACT EXTRACTION Bilateral    • COLONOSCOPY      Complete   • COLONOSCOPY N/A 2/15/2017    Procedure: COLONOSCOPY to cecum and TI with cold and hot snare polypectomies;  Surgeon: Jae Rosado MD;  Location: [facility] ENDOSCOPY;  Service:    • CORONARY ARTERY BYPASS GRAFT  1980'S & 2003    2 different surgeries BOTH SURGERY 3 VESSELS DONE   • ENDOSCOPY N/A 2/15/2017    Procedure: ESOPHAGOGASTRODUODENOSCOPY with biopsies;  Surgeon: Jae Rosado MD;  Location: [facility] ENDOSCOPY;  Service:    • GALLBLADDER SURGERY     • KS DRAIN LOWER LEG DEEP ABSC/HEMATOMA Left 7/14/2016     Procedure: LOWER EXTREMITY DEBRIDEMENT, EXCISION OF LT LEG SKIN TUMOR WITH FULL THICKNESS SKIN GRAFT RECONSTRUCTION;  Surgeon: Molina Jackson MD;  Location: [facility] OR;  Service: Plastics   • TRANSURETHRAL RESECTION OF BLADDER TUMOR N/A 4/10/2017    Procedure: CYSTOSCOPY WITH CLOT EVACUATION;  Surgeon: Vel Rogel MD;  Location: [facility] MAIN OR;  Service:          FAMILY HISTORY  Family History   Problem Relation Age of Onset   • Heart failure Mother      congestive   • Lung cancer Father    • Diabetes Brother      mellitus         SOCIAL HISTORY  Social History     Social History   • Marital status:      Spouse name: N/A   • Number of children: 4   • Years of education: N/A     Occupational History   • retired      Social History Main Topics   • Smoking status: Former Smoker     Packs/day: 0.50     Years: 30.00     Types: Cigarettes, Pipe, Cigars   • Smokeless tobacco: Never Used      Comment: 25 YR AGO   • Alcohol use No      Comment: caffeine use   • Drug use: No   • Sexual activity: Defer     Other Topics Concern   • Not on file     Social History Narrative   • No narrative on file         ALLERGIES  Iodine and Keflex [cephalexin]        REVIEW OF SYSTEMS  Review of Systems   Constitutional: Negative for chills.   HENT: Negative for congestion, rhinorrhea and sore throat.    Eyes: Negative for pain.   Respiratory: Negative for cough and shortness of breath.    Cardiovascular: Negative for chest pain, palpitations and leg swelling.   Gastrointestinal: Positive for abdominal pain. Negative for diarrhea, nausea and vomiting.   Genitourinary: Positive for decreased urine volume (decreased urinary output into the chung catheter).   Musculoskeletal: Negative for myalgias, neck pain and neck stiffness.   Skin: Negative for rash.   Neurological: Negative for dizziness, speech difficulty, weakness, light-headedness, numbness and headaches.   Psychiatric/Behavioral: Negative.    All other systems  reviewed and are negative.           PHYSICAL EXAM  ED Triage Vitals   Temp Heart Rate Resp BP SpO2   10/1934 10/1934 10/1934 10/21/17 1957 10/1934   99.3 °F (37.4 °C) 113 18 153/82 96 % WNL      Temp src Heart Rate Source Patient Position BP Location FiO2 (%)   10/1934 10/1934 10/21/17 1957 10/21/17 1957 --   Tympanic Monitor Lying Left arm          Physical Exam   Constitutional: He is oriented to person, place, and time. No distress.   HENT:   Head: Normocephalic.   Mouth/Throat: Mucous membranes are normal.   Eyes: EOM are normal. Pupils are equal, round, and reactive to light.   Neck: Normal range of motion. Neck supple.   Cardiovascular: Normal rate, regular rhythm and normal heart sounds.    Pulmonary/Chest: Effort normal and breath sounds normal. No respiratory distress. He has no decreased breath sounds. He has no wheezes. He has no rhonchi. He has no rales.   Abdominal: Soft. There is tenderness (mild) in the suprapubic area. There is guarding. There is no rebound.   Genitourinary:   Genitourinary Comments: Pt has a chung catheter in place. There is no urinary output from the chung catheter.    Musculoskeletal: Normal range of motion.   There are chronic venous stasis changes present to the BLEs.    Neurological: He is alert and oriented to person, place, and time. He has normal sensation.   Skin: Skin is warm and dry.   Psychiatric: Mood and affect normal.   Nursing note and vitals reviewed.            PROCEDURES  Procedures            PROGRESS AND CONSULTS  ED Course     7:59 PM:  Will attempt to flush pt's chung catheter.     8:13 PM:  Rechecked pt. Pt's chung catheter was flushed. The catheter is now draining urine. Pt reports that he feels significantly better. On re-evaluation, pt's abdomen is soft and nontender. Pt was advised to f/u with the urologist/PMD. RTER warnings given. Pt agrees with plan for discharge.    8:23 PM:  Pt's case was d/w Dr. Bains (ER  physician). After a bedside evaluation, he agrees with the course of care.           MEDICAL DECISION MAKING      MDM  Number of Diagnoses or Management Options  Patient Progress  Patient progress: stable             DIAGNOSIS  Final diagnoses:   Malfunction of Flores catheter, initial encounter         DISPOSITION  Pt discharged.    DISCHARGE    Patient discharged in stable condition.    Reviewed implications of results, diagnosis, meds, responsibility to follow up, warning signs and symptoms of possible worsening, potential complications and reasons to return to ER.    Patient/Family voiced understanding of above instructions.    Discussed plan for discharge, as there is no emergent indication for admission.  Pt/family is agreeable and understands need for follow up and repeat testing.  Pt is aware that discharge does not mean that nothing is wrong but it indicates no emergency is present that requires admission and they must continue care with follow-up as given below or physician of their choice.     FOLLOW-UP  Charles Rose MD  [id number] [id number]  [phone]    Schedule an appointment as soon as possible for a visit in 3 days                Latest Documented Vital Signs:  As of 8:25 PM  BP- 153/82 HR- 113 Temp- 99.3 °F (37.4 °C) (Tympanic) O2 sat- 96%        --  Documentation assistance provided by carmen Dover for Mr. Yahir Boone PA-C.  Information recorded by the carmen was done at my direction and has been verified and validated by me.         Nadya Dover  10/21/17 2025       MATHEW Triplett  10/21/17 2041

## 2017-10-22 NOTE — ED PROVIDER NOTES
Patient presents to the ER with decreased urine output from his indwelling chung catheter since earlier today. On arrival, catheter was irrigated and is now flowing properly. Exam is unremarkable. I agree with the plan to discharge.        I supervised care provided by the midlevel provider.    We have discussed this patient's history, physical exam, and treatment plan.   I have reviewed the note and personally saw and examined the patient and agree with the plan of care.    Documentation assistance provided by carmen Mack for .  Information recorded by the carmen was done at my direction and has been verified and validated by me.       Martita Mack  10/21/17 [id number]       Thompson Bains MD  10/21/17 0326

## 2017-10-22 NOTE — ED NOTES
Pt here for chung catheter malfunction - pt had chung placed on 10/16, history of bladder cancer, went through radiation for such.  Pt has not had any output from catheter since 1500 today.  Pt states pain in lower abdomen 8/10 due to fullness.       Kiana Mantilla RN  10/21/17 2020

## 2017-10-22 NOTE — ED NOTES
Flores catheter irrigated with normal saline.  Return of hazy straw birgit urine with mucous and a small blood clot. 500cc immediate return.  Pt states feeling much better       Harley Low RN  10/21/17 2020

## 2017-10-25 NOTE — PROGRESS NOTES
"Imtiaz Jones is a 88 y.o. male.  Seen 10/25/2017    Assessment/Plan   Problem List Items Addressed This Visit        Genitourinary    Malignant neoplasm of urinary bladder - Primary           Patient with indwelling catheter no evidence of obstruction or blood will have routine follow-up with his urologist  Flu vaccine given today      Subjective     Chief Complaint   Patient presents with   • [facility] follow up to catether issues     Social History   Substance Use Topics   • Smoking status: Former Smoker     Packs/day: 0.50     Years: 30.00     Types: Cigarettes, Pipe, Cigars   • Smokeless tobacco: Never Used      Comment: 25 YR AGO   • Alcohol use No      Comment: caffeine use       History of Present Illness   She comes in because he's had an obstruction of his catheter and to be related to blood clot he has his catheter changed every 30 days through his urology office since he has history of bladder cancer has no acute concerns today and is feeling fine  The following portions of the patient's history were reviewed and updated as appropriate:PMHroutine: Social history , Past Medical History, Surgical history , Allergies, Current Medications, Active Problem List and Health Maintenance    Review of Systems   Constitutional: Negative.    Gastrointestinal: Negative.    Genitourinary: Negative.    Psychiatric/Behavioral: Negative.        Objective   Vitals:    10/25/17 0846   BP: 162/56   BP Location: Right arm   Patient Position: Sitting   Cuff Size: Adult   Pulse: 90   Temp: 98 °F (36.7 °C)   TempSrc: Oral   SpO2: 97%   Weight: 164 lb 6.4 oz (74.6 kg)   Height: 70\" (177.8 cm)     Body mass index is 23.59 kg/(m^2).  Physical Exam   Constitutional: He appears well-developed and well-nourished.   HENT:   Head: Normocephalic and atraumatic.   Abdominal: Soft. Bowel sounds are normal. He exhibits no distension. There is no tenderness.   Genitourinary:   Genitourinary Comments: Indwelling catheter with leg " bag billy urine no blood seen   Musculoskeletal: He exhibits edema.   Skin: Skin is dry.   Psychiatric: He has a normal mood and affect. His behavior is normal. Judgment and thought content normal.   Vitals reviewed.    Reviewed Data:  No visits with results within 1 Month(s) from this visit.  Latest known visit with results is:    Admission on 08/13/2017, Discharged on 08/13/2017   Component Date Value Ref Range Status   • Glucose 08/13/2017 109* 65 - 99 mg/dL Final   • BUN 08/13/2017 41* 8 - 23 mg/dL Final   • Creatinine 08/13/2017 1.49* 0.76 - 1.27 mg/dL Final   • Sodium 08/13/2017 143  136 - 145 mmol/L Final   • Potassium 08/13/2017 4.2  3.5 - 5.2 mmol/L Final   • Chloride 08/13/2017 104  98 - 107 mmol/L Final   • CO2 08/13/2017 22.9  22.0 - 29.0 mmol/L Final   • Calcium 08/13/2017 10.2  8.6 - 10.5 mg/dL Final   • eGFR Non African Amer 08/13/2017 45* >60 mL/min/1.73 Final   • BUN/Creatinine Ratio 08/13/2017 27.5* 7.0 - 25.0 Final   • Anion Gap 08/13/2017 16.1  mmol/L Final   • Color, UA 08/13/2017 Yellow  Yellow, Straw Final   • Appearance, UA 08/13/2017 Clear  Clear Final   • pH, UA 08/13/2017 5.5  5.0 - 8.0 Final   • Specific Gravity, UA 08/13/2017 1.010  1.005 - 1.030 Final   • Glucose, UA 08/13/2017 Negative  Negative Final   • Ketones, UA 08/13/2017 Negative  Negative Final   • Bilirubin, UA 08/13/2017 Negative  Negative Final   • Blood, UA 08/13/2017 Large (3+)* Negative Final   • Protein, UA 08/13/2017 Negative  Negative Final   • Leuk Esterase, UA 08/13/2017 Negative  Negative Final   • Nitrite, UA 08/13/2017 Negative  Negative Final   • Urobilinogen, UA 08/13/2017 0.2 E.U./dL  0.2 - 1.0 E.U./dL Final   • RBC, UA 08/13/2017 21-30* None Seen, 0-2 /HPF Final   • WBC, UA 08/13/2017 0-2  None Seen, 0-2 /HPF Final   • Bacteria, UA 08/13/2017 None Seen  None Seen /HPF Final   • Squamous Epithelial Cells, UA 08/13/2017 0-2  None Seen, 0-2 /HPF Final   • Hyaline Casts, UA 08/13/2017 0-2  None Seen /LPF Final    • Methodology 08/13/2017 Automated Microscopy   Final

## 2017-11-29 NOTE — TELEPHONE ENCOUNTER
Patient called stating that he went to his urologist yesterday and he was advised to take Lasix.  Patient has a bottle of furosemide 20 mg.  Patient stated that he was advised to take this for the swelling in his legs and testicles.  Patient stated that Dr. Rose told him to not take the Lasix.  Patient wanted to know what he should do.  Please advise.

## 2017-11-29 NOTE — TELEPHONE ENCOUNTER
I called and informed the pt Dr. Varela said it was fine to take the lasix per his urologist, 20mg 1 qd

## 2017-12-19 PROBLEM — N50.819 TESTICLE PAIN: Status: ACTIVE | Noted: 2017-01-01

## 2017-12-19 NOTE — PROGRESS NOTES
Imtiaz Jones is a 88 y.o. male.      Assessment/Plan   Problem List Items Addressed This Visit        Cardiovascular and Mediastinum    Hypertension - Primary (Chronic)    Relevant Orders    Comprehensive Metabolic Panel    Hyperlipidemia (Chronic)       Nervous and Auditory    Testicle pain           Patient follow-up results of CMP determine ongoing management of hypertension as will call with results and decide if ongoing furosemide as appropriate continue present dose of simvastatin for hyperlipidemia.  Tramadol for testicular pain.  Follow-up as needed or 3 months      Chief Complaint   Patient presents with   • follow up to hypertension   • follow up to hyperlipidemia   Testicle pain  Social History   Substance Use Topics   • Smoking status: Former Smoker     Packs/day: 0.50     Years: 30.00     Types: Cigarettes, Pipe, Cigars   • Smokeless tobacco: Never Used      Comment: 25 YR AGO   • Alcohol use No      Comment: caffeine use       History of Present Illness   She comes in for follow-up of hypertension hyperlipidemia somewhat discussion regarding whether or not he's been taking hydralazine diffuses now he has been taking furosemide intermittently he has no chest pain or shortness of breath no cough he is to continue with his statin therapy for hyperlipidemia he has recently seen his urologist for urinary tract infection and was placed on Bactrim.  Like pain reliever for his testicles because it hurts when he sits down he has no fever and feels otherwise fairly comfortable  The following portions of the patient's history were reviewed and updated as appropriate:PMHroutine: Social history , Past Medical History, Allergies, Current Medications, Active Problem List and Health Maintenance    Review of Systems   Constitutional: Negative.    HENT: Negative.    Respiratory: Negative.    Cardiovascular: Positive for leg swelling. Negative for chest pain.   Gastrointestinal: Negative.    Genitourinary: Positive  "for testicular pain. Negative for genital sores and penile pain.        Indwelling catheter   Psychiatric/Behavioral: Negative.        Objective   Vitals:    12/19/17 1242   BP: 146/62   BP Location: Right arm   Patient Position: Sitting   Cuff Size: Adult   Pulse: 72   Temp: 98.2 °F (36.8 °C)   TempSrc: Oral   SpO2: 97%   Weight: 74.5 kg (164 lb 3.2 oz)   Height: 177.8 cm (70\")     Body mass index is 23.56 kg/(m^2).  Physical Exam   Constitutional: He is oriented to person, place, and time. He appears well-developed and well-nourished.   HENT:   Head: Normocephalic and atraumatic.   Cardiovascular: Normal rate and regular rhythm.    Pulmonary/Chest: Effort normal and breath sounds normal.   Abdominal: Soft. Bowel sounds are normal. There is no tenderness.   Musculoskeletal: Normal range of motion. He exhibits edema.   Neurological: He is alert and oriented to person, place, and time.   Psychiatric: He has a normal mood and affect. His behavior is normal. Judgment and thought content normal.   Nursing note and vitals reviewed.    Reviewed Data:  No visits with results within 1 Month(s) from this visit.  Latest known visit with results is:    Admission on 08/13/2017, Discharged on 08/13/2017   Component Date Value Ref Range Status   • Glucose 08/13/2017 109* 65 - 99 mg/dL Final   • BUN 08/13/2017 41* 8 - 23 mg/dL Final   • Creatinine 08/13/2017 1.49* 0.76 - 1.27 mg/dL Final   • Sodium 08/13/2017 143  136 - 145 mmol/L Final   • Potassium 08/13/2017 4.2  3.5 - 5.2 mmol/L Final   • Chloride 08/13/2017 104  98 - 107 mmol/L Final   • CO2 08/13/2017 22.9  22.0 - 29.0 mmol/L Final   • Calcium 08/13/2017 10.2  8.6 - 10.5 mg/dL Final   • eGFR Non African Amer 08/13/2017 45* >60 mL/min/1.73 Final   • BUN/Creatinine Ratio 08/13/2017 27.5* 7.0 - 25.0 Final   • Anion Gap 08/13/2017 16.1  mmol/L Final   • Color, UA 08/13/2017 Yellow  Yellow, Straw Final   • Appearance,  08/13/2017 Clear  Clear Final   • pH,  08/13/2017 5.5  " 5.0 - 8.0 Final   • Specific Gravity, UA 08/13/2017 1.010  1.005 - 1.030 Final   • Glucose, UA 08/13/2017 Negative  Negative Final   • Ketones, UA 08/13/2017 Negative  Negative Final   • Bilirubin, UA 08/13/2017 Negative  Negative Final   • Blood, UA 08/13/2017 Large (3+)* Negative Final   • Protein, UA 08/13/2017 Negative  Negative Final   • Leuk Esterase, UA 08/13/2017 Negative  Negative Final   • Nitrite, UA 08/13/2017 Negative  Negative Final   • Urobilinogen, UA 08/13/2017 0.2 E.U./dL  0.2 - 1.0 E.U./dL Final   • RBC, UA 08/13/2017 21-30* None Seen, 0-2 /HPF Final   • WBC, UA 08/13/2017 0-2  None Seen, 0-2 /HPF Final   • Bacteria, UA 08/13/2017 None Seen  None Seen /HPF Final   • Squamous Epithelial Cells, UA 08/13/2017 0-2  None Seen, 0-2 /HPF Final   • Hyaline Casts, UA 08/13/2017 0-2  None Seen /LPF Final   • Methodology 08/13/2017 Automated Microscopy   Final

## 2017-12-20 NOTE — TELEPHONE ENCOUNTER
----- Message from Charles Rose MD sent at 12/20/2017 12:05 PM EST -----  Worsening kidney function recommend consultation follow up with nephrologist

## 2017-12-20 NOTE — TELEPHONE ENCOUNTER
Patient called stating that he spoke to Dr. Rose yesterday about hydralazine.  He received this yesterday in the mail.  Patient wanted to know if he should take this medication (medication ordered by Dr. Knowles).  Please advise.

## 2018-01-01 ENCOUNTER — OFFICE VISIT (OUTPATIENT)
Dept: CARDIOLOGY | Facility: CLINIC | Age: 83
End: 2018-01-01

## 2018-01-01 ENCOUNTER — TELEPHONE (OUTPATIENT)
Dept: INTERNAL MEDICINE | Facility: CLINIC | Age: 83
End: 2018-01-01

## 2018-01-01 ENCOUNTER — TELEPHONE (OUTPATIENT)
Dept: CARDIOLOGY | Facility: CLINIC | Age: 83
End: 2018-01-01

## 2018-01-01 ENCOUNTER — HOSPITAL ENCOUNTER (INPATIENT)
Facility: HOSPITAL | Age: 83
LOS: 4 days | Discharge: HOME HEALTH | End: 2018-01-06
Attending: EMERGENCY MEDICINE | Admitting: INTERNAL MEDICINE

## 2018-01-01 ENCOUNTER — APPOINTMENT (OUTPATIENT)
Dept: GENERAL RADIOLOGY | Facility: HOSPITAL | Age: 83
End: 2018-01-01

## 2018-01-01 ENCOUNTER — EPISODE CHANGES (OUTPATIENT)
Dept: CASE MANAGEMENT | Facility: OTHER | Age: 83
End: 2018-01-01

## 2018-01-01 ENCOUNTER — OFFICE VISIT (OUTPATIENT)
Dept: INTERNAL MEDICINE | Facility: CLINIC | Age: 83
End: 2018-01-01

## 2018-01-01 ENCOUNTER — APPOINTMENT (OUTPATIENT)
Dept: CARDIOLOGY | Facility: HOSPITAL | Age: 83
End: 2018-01-01
Attending: INTERNAL MEDICINE

## 2018-01-01 ENCOUNTER — APPOINTMENT (OUTPATIENT)
Dept: CT IMAGING | Facility: HOSPITAL | Age: 83
End: 2018-01-01

## 2018-01-01 ENCOUNTER — APPOINTMENT (OUTPATIENT)
Dept: GENERAL RADIOLOGY | Facility: HOSPITAL | Age: 83
End: 2018-01-01
Attending: INTERNAL MEDICINE

## 2018-01-01 ENCOUNTER — LAB REQUISITION (OUTPATIENT)
Dept: LAB | Facility: HOSPITAL | Age: 83
End: 2018-01-01

## 2018-01-01 ENCOUNTER — HOSPITAL ENCOUNTER (INPATIENT)
Facility: HOSPITAL | Age: 83
LOS: 2 days | Discharge: HOME HEALTH | End: 2018-02-04
Attending: EMERGENCY MEDICINE | Admitting: INTERNAL MEDICINE

## 2018-01-01 ENCOUNTER — HOSPITAL ENCOUNTER (INPATIENT)
Facility: HOSPITAL | Age: 83
LOS: 1 days | End: 2018-02-23
Attending: EMERGENCY MEDICINE | Admitting: INTERNAL MEDICINE

## 2018-01-01 VITALS
WEIGHT: 137.4 LBS | DIASTOLIC BLOOD PRESSURE: 60 MMHG | HEIGHT: 70 IN | SYSTOLIC BLOOD PRESSURE: 90 MMHG | BODY MASS INDEX: 19.67 KG/M2 | HEART RATE: 87 BPM

## 2018-01-01 VITALS
SYSTOLIC BLOOD PRESSURE: 110 MMHG | HEIGHT: 70 IN | OXYGEN SATURATION: 92 % | RESPIRATION RATE: 16 BRPM | TEMPERATURE: 97.9 F | HEART RATE: 85 BPM | BODY MASS INDEX: 26.51 KG/M2 | DIASTOLIC BLOOD PRESSURE: 70 MMHG | WEIGHT: 185.19 LBS

## 2018-01-01 VITALS
SYSTOLIC BLOOD PRESSURE: 52 MMHG | OXYGEN SATURATION: 99 % | DIASTOLIC BLOOD PRESSURE: 26 MMHG | HEIGHT: 67 IN | RESPIRATION RATE: 34 BRPM | BODY MASS INDEX: 21 KG/M2 | WEIGHT: 133.82 LBS | TEMPERATURE: 98.2 F

## 2018-01-01 VITALS
BODY MASS INDEX: 20.3 KG/M2 | TEMPERATURE: 97.9 F | HEART RATE: 98 BPM | WEIGHT: 141.8 LBS | DIASTOLIC BLOOD PRESSURE: 68 MMHG | HEIGHT: 70 IN | OXYGEN SATURATION: 96 % | SYSTOLIC BLOOD PRESSURE: 114 MMHG

## 2018-01-01 VITALS
WEIGHT: 147 LBS | HEIGHT: 70 IN | OXYGEN SATURATION: 96 % | DIASTOLIC BLOOD PRESSURE: 58 MMHG | RESPIRATION RATE: 18 BRPM | TEMPERATURE: 97.8 F | HEART RATE: 78 BPM | SYSTOLIC BLOOD PRESSURE: 139 MMHG | BODY MASS INDEX: 21.05 KG/M2

## 2018-01-01 DIAGNOSIS — J18.9 PNEUMONIA OF LEFT LOWER LOBE DUE TO INFECTIOUS ORGANISM: ICD-10-CM

## 2018-01-01 DIAGNOSIS — I15.1 HYPERTENSION SECONDARY TO OTHER RENAL DISORDERS: Chronic | ICD-10-CM

## 2018-01-01 DIAGNOSIS — Z74.09 IMPAIRED FUNCTIONAL MOBILITY, BALANCE, GAIT, AND ENDURANCE: ICD-10-CM

## 2018-01-01 DIAGNOSIS — D64.9 CHRONIC ANEMIA: ICD-10-CM

## 2018-01-01 DIAGNOSIS — K59.00 CONSTIPATION, UNSPECIFIED CONSTIPATION TYPE: ICD-10-CM

## 2018-01-01 DIAGNOSIS — N28.89 HYPERTENSION SECONDARY TO OTHER RENAL DISORDERS: Chronic | ICD-10-CM

## 2018-01-01 DIAGNOSIS — R53.1 GENERALIZED WEAKNESS: ICD-10-CM

## 2018-01-01 DIAGNOSIS — I25.10 CHRONIC CORONARY ARTERY DISEASE: ICD-10-CM

## 2018-01-01 DIAGNOSIS — I21.3 ACUTE ST ELEVATION MYOCARDIAL INFARCTION (STEMI), UNSPECIFIED ARTERY (HCC): Primary | ICD-10-CM

## 2018-01-01 DIAGNOSIS — Z00.00 ENCOUNTER FOR GENERAL ADULT MEDICAL EXAMINATION WITHOUT ABNORMAL FINDINGS: ICD-10-CM

## 2018-01-01 DIAGNOSIS — N18.9 ACUTE ON CHRONIC RENAL INSUFFICIENCY: ICD-10-CM

## 2018-01-01 DIAGNOSIS — R65.21 SEPTIC SHOCK (HCC): Primary | ICD-10-CM

## 2018-01-01 DIAGNOSIS — A41.9 SEPSIS, DUE TO UNSPECIFIED ORGANISM: Primary | ICD-10-CM

## 2018-01-01 DIAGNOSIS — A41.51 SEPSIS DUE TO ESCHERICHIA COLI (HCC): ICD-10-CM

## 2018-01-01 DIAGNOSIS — N39.0 ACUTE UTI: Primary | ICD-10-CM

## 2018-01-01 DIAGNOSIS — N39.0 ACUTE UTI: ICD-10-CM

## 2018-01-01 DIAGNOSIS — N17.9 ACUTE RENAL FAILURE SUPERIMPOSED ON CHRONIC KIDNEY DISEASE, UNSPECIFIED CKD STAGE, UNSPECIFIED ACUTE RENAL FAILURE TYPE (HCC): ICD-10-CM

## 2018-01-01 DIAGNOSIS — N18.30 CHRONIC KIDNEY DISEASE, STAGE 3 (HCC): Chronic | ICD-10-CM

## 2018-01-01 DIAGNOSIS — I25.10 CORONARY ARTERY DISEASE INVOLVING NATIVE CORONARY ARTERY OF NATIVE HEART WITHOUT ANGINA PECTORIS: ICD-10-CM

## 2018-01-01 DIAGNOSIS — K92.2 GASTROINTESTINAL HEMORRHAGE, UNSPECIFIED GASTROINTESTINAL HEMORRHAGE TYPE: ICD-10-CM

## 2018-01-01 DIAGNOSIS — N18.30 CHRONIC KIDNEY DISEASE, STAGE 3 (HCC): Primary | Chronic | ICD-10-CM

## 2018-01-01 DIAGNOSIS — I44.2 TRANSIENT COMPLETE HEART BLOCK (HCC): ICD-10-CM

## 2018-01-01 DIAGNOSIS — I50.42 CHRONIC COMBINED SYSTOLIC AND DIASTOLIC HEART FAILURE (HCC): Chronic | ICD-10-CM

## 2018-01-01 DIAGNOSIS — A41.9 SEPTIC SHOCK (HCC): Primary | ICD-10-CM

## 2018-01-01 DIAGNOSIS — I42.9 CARDIOMYOPATHY, UNSPECIFIED TYPE (HCC): Chronic | ICD-10-CM

## 2018-01-01 DIAGNOSIS — L98.491: ICD-10-CM

## 2018-01-01 DIAGNOSIS — R77.8 ELEVATED TROPONIN: ICD-10-CM

## 2018-01-01 DIAGNOSIS — R91.8 PULMONARY NODULES: ICD-10-CM

## 2018-01-01 DIAGNOSIS — N18.9 ACUTE RENAL FAILURE SUPERIMPOSED ON CHRONIC KIDNEY DISEASE, UNSPECIFIED CKD STAGE, UNSPECIFIED ACUTE RENAL FAILURE TYPE (HCC): ICD-10-CM

## 2018-01-01 DIAGNOSIS — N28.9 ACUTE ON CHRONIC RENAL INSUFFICIENCY: ICD-10-CM

## 2018-01-01 DIAGNOSIS — N17.9 ACUTE RENAL FAILURE, UNSPECIFIED ACUTE RENAL FAILURE TYPE (HCC): ICD-10-CM

## 2018-01-01 LAB
ABO + RH BLD: NORMAL
ABO + RH BLD: NORMAL
ABO GROUP BLD: NORMAL
ALBUMIN SERPL-MCNC: 2.6 G/DL (ref 3.5–5.2)
ALBUMIN SERPL-MCNC: 2.6 G/DL (ref 3.5–5.2)
ALBUMIN SERPL-MCNC: 2.7 G/DL (ref 3.5–5.2)
ALBUMIN SERPL-MCNC: 2.8 G/DL (ref 3.5–5.2)
ALBUMIN SERPL-MCNC: 2.9 G/DL (ref 3.5–5.2)
ALBUMIN SERPL-MCNC: 3 G/DL (ref 3.5–5.2)
ALBUMIN SERPL-MCNC: 3.2 G/DL (ref 3.5–5.2)
ALBUMIN SERPL-MCNC: 3.5 G/DL (ref 3.5–5.2)
ALBUMIN/GLOB SERPL: 0.7 G/DL
ALBUMIN/GLOB SERPL: 0.8 G/DL
ALBUMIN/GLOB SERPL: 0.9 G/DL
ALP SERPL-CCNC: 171 U/L (ref 39–117)
ALP SERPL-CCNC: 80 U/L (ref 39–117)
ALP SERPL-CCNC: 84 U/L (ref 39–117)
ALP SERPL-CCNC: 85 U/L (ref 39–117)
ALP SERPL-CCNC: 89 U/L (ref 39–117)
ALP SERPL-CCNC: 89 U/L (ref 39–117)
ALP SERPL-CCNC: 95 U/L (ref 39–117)
ALP SERPL-CCNC: 97 U/L (ref 39–117)
ALT SERPL W P-5'-P-CCNC: 10 U/L (ref 1–41)
ALT SERPL W P-5'-P-CCNC: 17 U/L (ref 1–41)
ALT SERPL W P-5'-P-CCNC: 231 U/L (ref 1–41)
ALT SERPL W P-5'-P-CCNC: 262 U/L (ref 1–41)
ALT SERPL W P-5'-P-CCNC: 278 U/L (ref 1–41)
ALT SERPL W P-5'-P-CCNC: 286 U/L (ref 1–41)
ALT SERPL W P-5'-P-CCNC: 61 U/L (ref 1–41)
ALT SERPL-CCNC: 18 U/L (ref 1–41)
ANION GAP SERPL CALCULATED.3IONS-SCNC: 13 MMOL/L
ANION GAP SERPL CALCULATED.3IONS-SCNC: 13.4 MMOL/L
ANION GAP SERPL CALCULATED.3IONS-SCNC: 15.8 MMOL/L
ANION GAP SERPL CALCULATED.3IONS-SCNC: 16.9 MMOL/L
ANION GAP SERPL CALCULATED.3IONS-SCNC: 18.1 MMOL/L
ANION GAP SERPL CALCULATED.3IONS-SCNC: 18.3 MMOL/L
ANION GAP SERPL CALCULATED.3IONS-SCNC: 18.4 MMOL/L
ANION GAP SERPL CALCULATED.3IONS-SCNC: 20.9 MMOL/L
ANION GAP SERPL CALCULATED.3IONS-SCNC: 21.3 MMOL/L
ANION GAP SERPL CALCULATED.3IONS-SCNC: 9.5 MMOL/L
ANISOCYTOSIS BLD QL: NORMAL
ANTI-D: NORMAL
ARTERIAL PATENCY WRIST A: ABNORMAL
ARTERIAL PATENCY WRIST A: POSITIVE
AST SERPL-CCNC: 102 U/L (ref 1–40)
AST SERPL-CCNC: 116 U/L (ref 1–40)
AST SERPL-CCNC: 20 U/L (ref 1–40)
AST SERPL-CCNC: 208 U/L (ref 1–40)
AST SERPL-CCNC: 21 U/L (ref 1–40)
AST SERPL-CCNC: 212 U/L (ref 1–40)
AST SERPL-CCNC: 23 U/L (ref 1–40)
AST SERPL-CCNC: 347 U/L (ref 1–40)
ATMOSPHERIC PRESS: 755.3 MMHG
ATMOSPHERIC PRESS: 756.4 MMHG
B PERT DNA SPEC QL NAA+PROBE: NOT DETECTED
BACTERIA BLD CULT: ABNORMAL
BACTERIA SPEC AEROBE CULT: ABNORMAL
BACTERIA SPEC AEROBE CULT: NORMAL
BACTERIA SPEC AEROBE CULT: NORMAL
BACTERIA UR QL AUTO: ABNORMAL /HPF
BASE EXCESS BLDA CALC-SCNC: -13.2 MMOL/L (ref 0–2)
BASE EXCESS BLDA CALC-SCNC: -20.9 MMOL/L (ref 0–2)
BASOPHILS # BLD AUTO: 0.01 10*3/MM3 (ref 0–0.2)
BASOPHILS # BLD AUTO: 0.02 10*3/MM3 (ref 0–0.2)
BASOPHILS # BLD AUTO: 0.04 10*3/MM3 (ref 0–0.2)
BASOPHILS NFR BLD AUTO: 0 % (ref 0–1.5)
BASOPHILS NFR BLD AUTO: 0.1 % (ref 0–1.5)
BASOPHILS NFR BLD AUTO: 0.2 % (ref 0–1.5)
BASOPHILS NFR BLD AUTO: 0.2 % (ref 0–1.5)
BASOPHILS NFR BLD AUTO: 0.3 % (ref 0–1.5)
BASOPHILS NFR BLD AUTO: 0.3 % (ref 0–1.5)
BDY SITE: ABNORMAL
BDY SITE: ABNORMAL
BH BB BLOOD EXPIRATION DATE: NORMAL
BH BB BLOOD EXPIRATION DATE: NORMAL
BH BB BLOOD TYPE BARCODE: 600
BH BB BLOOD TYPE BARCODE: 600
BH BB DISPENSE STATUS: NORMAL
BH BB DISPENSE STATUS: NORMAL
BH BB PRODUCT CODE: NORMAL
BH BB PRODUCT CODE: NORMAL
BH BB UNIT NUMBER: NORMAL
BH BB UNIT NUMBER: NORMAL
BH CV ECHO MEAS - ACS: 1.7 CM
BH CV ECHO MEAS - AO MEAN PG (FULL): 1 MMHG
BH CV ECHO MEAS - AO MEAN PG: 3 MMHG
BH CV ECHO MEAS - AO ROOT AREA (BSA CORRECTED): 1.5
BH CV ECHO MEAS - AO ROOT AREA: 6.2 CM^2
BH CV ECHO MEAS - AO ROOT DIAM: 2.8 CM
BH CV ECHO MEAS - AO V2 MAX: 118 CM/SEC
BH CV ECHO MEAS - AO V2 MEAN: 82.8 CM/SEC
BH CV ECHO MEAS - AO V2 VTI: 24.4 CM
BH CV ECHO MEAS - ASC AORTA: 2.4 CM
BH CV ECHO MEAS - AVA(I,A): 2.5 CM^2
BH CV ECHO MEAS - AVA(I,D): 2.5 CM^2
BH CV ECHO MEAS - BSA(HAYCOCK): 1.9 M^2
BH CV ECHO MEAS - BSA: 1.9 M^2
BH CV ECHO MEAS - BZI_BMI: 23.5 KILOGRAMS/M^2
BH CV ECHO MEAS - BZI_METRIC_HEIGHT: 177.8 CM
BH CV ECHO MEAS - BZI_METRIC_WEIGHT: 74.4 KG
BH CV ECHO MEAS - CONTRAST EF (2CH): 23.8 ML/M^2
BH CV ECHO MEAS - CONTRAST EF 4CH: 29.4 ML/M^2
BH CV ECHO MEAS - EDV(CUBED): 117.6 ML
BH CV ECHO MEAS - EDV(MOD-SP2): 84 ML
BH CV ECHO MEAS - EDV(MOD-SP4): 136 ML
BH CV ECHO MEAS - EDV(TEICH): 112.8 ML
BH CV ECHO MEAS - EF(CUBED): 37 %
BH CV ECHO MEAS - EF(MOD-SP2): 23.8 %
BH CV ECHO MEAS - EF(MOD-SP4): 29.4 %
BH CV ECHO MEAS - EF(TEICH): 30.3 %
BH CV ECHO MEAS - ESV(CUBED): 74.1 ML
BH CV ECHO MEAS - ESV(MOD-SP2): 64 ML
BH CV ECHO MEAS - ESV(MOD-SP4): 96 ML
BH CV ECHO MEAS - ESV(TEICH): 78.6 ML
BH CV ECHO MEAS - FS: 14.3 %
BH CV ECHO MEAS - IVS/LVPW: 1
BH CV ECHO MEAS - IVSD: 1.1 CM
BH CV ECHO MEAS - LAT PEAK E' VEL: 7 CM/SEC
BH CV ECHO MEAS - LV DIASTOLIC VOL/BSA (35-75): 70.9 ML/M^2
BH CV ECHO MEAS - LV MASS(C)D: 200.5 GRAMS
BH CV ECHO MEAS - LV MASS(C)DI: 104.5 GRAMS/M^2
BH CV ECHO MEAS - LV MEAN PG: 2 MMHG
BH CV ECHO MEAS - LV SYSTOLIC VOL/BSA (12-30): 50 ML/M^2
BH CV ECHO MEAS - LV V1 MAX: 96 CM/SEC
BH CV ECHO MEAS - LV V1 MEAN: 60.3 CM/SEC
BH CV ECHO MEAS - LV V1 VTI: 19.2 CM
BH CV ECHO MEAS - LVIDD: 4.9 CM
BH CV ECHO MEAS - LVIDS: 4.2 CM
BH CV ECHO MEAS - LVLD AP2: 6.6 CM
BH CV ECHO MEAS - LVLD AP4: 8.2 CM
BH CV ECHO MEAS - LVLS AP2: 6.6 CM
BH CV ECHO MEAS - LVLS AP4: 7.1 CM
BH CV ECHO MEAS - LVOT AREA (M): 3.1 CM^2
BH CV ECHO MEAS - LVOT AREA: 3.1 CM^2
BH CV ECHO MEAS - LVOT DIAM: 2 CM
BH CV ECHO MEAS - LVPWD: 1.1 CM
BH CV ECHO MEAS - MED PEAK E' VEL: 5 CM/SEC
BH CV ECHO MEAS - MR MAX PG: 82.8 MMHG
BH CV ECHO MEAS - MR MAX VEL: 455 CM/SEC
BH CV ECHO MEAS - MV A DUR: 0.11 SEC
BH CV ECHO MEAS - MV A MAX VEL: 149 CM/SEC
BH CV ECHO MEAS - MV DEC SLOPE: 796 CM/SEC^2
BH CV ECHO MEAS - MV DEC TIME: 0.14 SEC
BH CV ECHO MEAS - MV E MAX VEL: 288.5 CM/SEC
BH CV ECHO MEAS - MV E/A: 1.9
BH CV ECHO MEAS - MV MEAN PG: 5 MMHG
BH CV ECHO MEAS - MV P1/2T MAX VEL: 136 CM/SEC
BH CV ECHO MEAS - MV P1/2T: 50 MSEC
BH CV ECHO MEAS - MV V2 MEAN: 103 CM/SEC
BH CV ECHO MEAS - MV V2 VTI: 39.6 CM
BH CV ECHO MEAS - MVA P1/2T LCG: 1.6 CM^2
BH CV ECHO MEAS - MVA(P1/2T): 4.4 CM^2
BH CV ECHO MEAS - MVA(VTI): 1.5 CM^2
BH CV ECHO MEAS - PA ACC SLOPE: 8.2 CM/SEC^2
BH CV ECHO MEAS - PA ACC TIME: 0.11 SEC
BH CV ECHO MEAS - PA MAX PG (FULL): 1.4 MMHG
BH CV ECHO MEAS - PA MAX PG: 4.7 MMHG
BH CV ECHO MEAS - PA PR(ACCEL): 31.3 MMHG
BH CV ECHO MEAS - PA V2 MAX: 108 CM/SEC
BH CV ECHO MEAS - PI END-D VEL: 121 CM/SEC
BH CV ECHO MEAS - PVA(V,A): 2.1 CM^2
BH CV ECHO MEAS - PVA(V,D): 2.1 CM^2
BH CV ECHO MEAS - QP/QS: 0.68
BH CV ECHO MEAS - RAP SYSTOLE: 15 MMHG
BH CV ECHO MEAS - RV MAX PG: 3.3 MMHG
BH CV ECHO MEAS - RV MEAN PG: 1 MMHG
BH CV ECHO MEAS - RV V1 MAX: 91 CM/SEC
BH CV ECHO MEAS - RV V1 MEAN: 51.8 CM/SEC
BH CV ECHO MEAS - RV V1 VTI: 16.2 CM
BH CV ECHO MEAS - RVOT AREA: 2.5 CM^2
BH CV ECHO MEAS - RVOT DIAM: 1.8 CM
BH CV ECHO MEAS - RVSP: 42.2 MMHG
BH CV ECHO MEAS - SI(AO): 78.3 ML/M^2
BH CV ECHO MEAS - SI(CUBED): 22.7 ML/M^2
BH CV ECHO MEAS - SI(LVOT): 31.4 ML/M^2
BH CV ECHO MEAS - SI(MOD-SP2): 10.4 ML/M^2
BH CV ECHO MEAS - SI(MOD-SP4): 20.8 ML/M^2
BH CV ECHO MEAS - SI(TEICH): 17.8 ML/M^2
BH CV ECHO MEAS - SV(AO): 150.2 ML
BH CV ECHO MEAS - SV(CUBED): 43.6 ML
BH CV ECHO MEAS - SV(LVOT): 60.3 ML
BH CV ECHO MEAS - SV(MOD-SP2): 20 ML
BH CV ECHO MEAS - SV(MOD-SP4): 40 ML
BH CV ECHO MEAS - SV(RVOT): 41.2 ML
BH CV ECHO MEAS - SV(TEICH): 34.2 ML
BH CV ECHO MEAS - TAPSE (>1.6): 1 CM2
BH CV ECHO MEAS - TR MAX VEL: 261 CM/SEC
BH CV VAS BP RIGHT ARM: NORMAL MMHG
BH CV XLRA - RV BASE: 3.2 CM
BH CV XLRA - TDI S': 5 CM/SEC
BILIRUB SERPL-MCNC: 0.3 MG/DL (ref 0.1–1.2)
BILIRUB SERPL-MCNC: 0.4 MG/DL (ref 0.1–1.2)
BILIRUB SERPL-MCNC: 0.5 MG/DL (ref 0.1–1.2)
BILIRUB SERPL-MCNC: 0.5 MG/DL (ref 0.1–1.2)
BILIRUB UR QL STRIP: NEGATIVE
BLD GP AB SCN SERPL QL: NEGATIVE
BUN BLD-MCNC: 45 MG/DL (ref 8–23)
BUN BLD-MCNC: 55 MG/DL (ref 8–23)
BUN BLD-MCNC: 56 MG/DL (ref 8–23)
BUN BLD-MCNC: 59 MG/DL (ref 8–23)
BUN BLD-MCNC: 60 MG/DL (ref 8–23)
BUN BLD-MCNC: 61 MG/DL (ref 8–23)
BUN BLD-MCNC: 61 MG/DL (ref 8–23)
BUN BLD-MCNC: 64 MG/DL (ref 8–23)
BUN BLD-MCNC: 67 MG/DL (ref 8–23)
BUN BLD-MCNC: 80 MG/DL (ref 8–23)
BUN SERPL-MCNC: 47 MG/DL (ref 8–23)
BUN/CREAT SERPL: 19.6 (ref 7–25)
BUN/CREAT SERPL: 21.2 (ref 7–25)
BUN/CREAT SERPL: 23.3 (ref 7–25)
BUN/CREAT SERPL: 23.3 (ref 7–25)
BUN/CREAT SERPL: 25.1 (ref 7–25)
BUN/CREAT SERPL: 25.3 (ref 7–25)
BUN/CREAT SERPL: 25.9 (ref 7–25)
BUN/CREAT SERPL: 26.7 (ref 7–25)
BUN/CREAT SERPL: 27.9 (ref 7–25)
BUN/CREAT SERPL: 31.4 (ref 7–25)
BUN/CREAT SERPL: 34.7 (ref 7–25)
C DIFF TOX GENS STL QL NAA+PROBE: NEGATIVE
C PNEUM DNA NPH QL NAA+NON-PROBE: NOT DETECTED
CALCIUM SERPL-MCNC: 9 MG/DL (ref 8.6–10.5)
CALCIUM SPEC-SCNC: 6.7 MG/DL (ref 8.6–10.5)
CALCIUM SPEC-SCNC: 8 MG/DL (ref 8.6–10.5)
CALCIUM SPEC-SCNC: 8.1 MG/DL (ref 8.6–10.5)
CALCIUM SPEC-SCNC: 8.1 MG/DL (ref 8.6–10.5)
CALCIUM SPEC-SCNC: 8.3 MG/DL (ref 8.6–10.5)
CALCIUM SPEC-SCNC: 8.4 MG/DL (ref 8.6–10.5)
CALCIUM SPEC-SCNC: 8.5 MG/DL (ref 8.6–10.5)
CALCIUM SPEC-SCNC: 9 MG/DL (ref 8.6–10.5)
CALCIUM SPEC-SCNC: 9.1 MG/DL (ref 8.6–10.5)
CALCIUM SPEC-SCNC: 9.7 MG/DL (ref 8.6–10.5)
CHLORIDE SERPL-SCNC: 100 MMOL/L (ref 98–107)
CHLORIDE SERPL-SCNC: 102 MMOL/L (ref 98–107)
CHLORIDE SERPL-SCNC: 103 MMOL/L (ref 98–107)
CHLORIDE SERPL-SCNC: 106 MMOL/L (ref 98–107)
CHLORIDE SERPL-SCNC: 108 MMOL/L (ref 98–107)
CHLORIDE SERPL-SCNC: 94 MMOL/L (ref 98–107)
CHLORIDE SERPL-SCNC: 95 MMOL/L (ref 98–107)
CHLORIDE SERPL-SCNC: 97 MMOL/L (ref 98–107)
CHLORIDE SERPL-SCNC: 98 MMOL/L (ref 98–107)
CHLORIDE UR-SCNC: 55 MMOL/L
CLARITY UR: ABNORMAL
CO2 SERPL-SCNC: 12.7 MMOL/L (ref 22–29)
CO2 SERPL-SCNC: 18.1 MMOL/L (ref 22–29)
CO2 SERPL-SCNC: 19.7 MMOL/L (ref 22–29)
CO2 SERPL-SCNC: 19.9 MMOL/L (ref 22–29)
CO2 SERPL-SCNC: 20.2 MMOL/L (ref 22–29)
CO2 SERPL-SCNC: 23 MMOL/L (ref 22–29)
CO2 SERPL-SCNC: 24.1 MMOL/L (ref 22–29)
CO2 SERPL-SCNC: 24.5 MMOL/L (ref 22–29)
CO2 SERPL-SCNC: 24.6 MMOL/L (ref 22–29)
CO2 SERPL-SCNC: 26.6 MMOL/L (ref 22–29)
CO2 SERPL-SCNC: 28.1 MMOL/L (ref 22–29)
COLOR UR: ABNORMAL
COLOR UR: YELLOW
CREAT BLD-MCNC: 1.76 MG/DL (ref 0.76–1.27)
CREAT BLD-MCNC: 1.94 MG/DL (ref 0.76–1.27)
CREAT BLD-MCNC: 2.21 MG/DL (ref 0.76–1.27)
CREAT BLD-MCNC: 2.21 MG/DL (ref 0.76–1.27)
CREAT BLD-MCNC: 2.3 MG/DL (ref 0.76–1.27)
CREAT BLD-MCNC: 2.4 MG/DL (ref 0.76–1.27)
CREAT BLD-MCNC: 2.47 MG/DL (ref 0.76–1.27)
CREAT BLD-MCNC: 2.57 MG/DL (ref 0.76–1.27)
CREAT BLD-MCNC: 2.59 MG/DL (ref 0.76–1.27)
CREAT BLD-MCNC: 3.19 MG/DL (ref 0.76–1.27)
CREAT SERPL-MCNC: 2.02 MG/DL (ref 0.76–1.27)
CREAT UR-MCNC: 54.3 MG/DL
CROSSMATCH INTERPRETATION: NORMAL
CROSSMATCH INTERPRETATION: NORMAL
D DIMER PPP FEU-MCNC: 15.68 MCGFEU/ML (ref 0–0.49)
D-LACTATE SERPL-SCNC: 1.9 MMOL/L (ref 0.5–2)
D-LACTATE SERPL-SCNC: 1.9 MMOL/L (ref 0.5–2)
D-LACTATE SERPL-SCNC: 13.1 MMOL/L (ref 0.5–2)
D-LACTATE SERPL-SCNC: 15.5 MMOL/L (ref 0.5–2)
D-LACTATE SERPL-SCNC: 3.3 MMOL/L (ref 0.5–2)
D-LACTATE SERPL-SCNC: 8.5 MMOL/L (ref 0.5–2)
DEPRECATED RDW RBC AUTO: 50.5 FL (ref 37–54)
DEPRECATED RDW RBC AUTO: 51.1 FL (ref 37–54)
DEPRECATED RDW RBC AUTO: 51.6 FL (ref 37–54)
DEPRECATED RDW RBC AUTO: 51.6 FL (ref 37–54)
DEPRECATED RDW RBC AUTO: 51.8 FL (ref 37–54)
DEPRECATED RDW RBC AUTO: 55.1 FL (ref 37–54)
DEPRECATED RDW RBC AUTO: 56.2 FL (ref 37–54)
DEPRECATED RDW RBC AUTO: 56.7 FL (ref 37–54)
DEPRECATED RDW RBC AUTO: 63.8 FL (ref 37–54)
DEPRECATED RDW RBC AUTO: 65.6 FL (ref 37–54)
E/E' RATIO: 78
EOSINOPHIL # BLD AUTO: 0 10*3/MM3 (ref 0–0.7)
EOSINOPHIL # BLD AUTO: 0.01 10*3/MM3 (ref 0–0.7)
EOSINOPHIL # BLD AUTO: 0.01 10*3/MM3 (ref 0–0.7)
EOSINOPHIL # BLD AUTO: 0.02 10*3/MM3 (ref 0–0.7)
EOSINOPHIL # BLD AUTO: 0.04 10*3/MM3 (ref 0–0.7)
EOSINOPHIL # BLD AUTO: 0.05 10*3/MM3 (ref 0–0.7)
EOSINOPHIL # BLD AUTO: 0.06 10*3/MM3 (ref 0–0.7)
EOSINOPHIL NFR BLD AUTO: 0 % (ref 0.3–6.2)
EOSINOPHIL NFR BLD AUTO: 0.1 % (ref 0.3–6.2)
EOSINOPHIL NFR BLD AUTO: 0.1 % (ref 0.3–6.2)
EOSINOPHIL NFR BLD AUTO: 0.2 % (ref 0.3–6.2)
EOSINOPHIL NFR BLD AUTO: 0.4 % (ref 0.3–6.2)
EOSINOPHIL NFR BLD AUTO: 0.6 % (ref 0.3–6.2)
EOSINOPHIL NFR BLD AUTO: 0.6 % (ref 0.3–6.2)
ERYTHROCYTE [DISTWIDTH] IN BLOOD BY AUTOMATED COUNT: 16.5 % (ref 11.5–14.5)
ERYTHROCYTE [DISTWIDTH] IN BLOOD BY AUTOMATED COUNT: 16.5 % (ref 11.5–14.5)
ERYTHROCYTE [DISTWIDTH] IN BLOOD BY AUTOMATED COUNT: 16.7 % (ref 11.5–14.5)
ERYTHROCYTE [DISTWIDTH] IN BLOOD BY AUTOMATED COUNT: 16.7 % (ref 11.5–14.5)
ERYTHROCYTE [DISTWIDTH] IN BLOOD BY AUTOMATED COUNT: 16.9 % (ref 11.5–14.5)
ERYTHROCYTE [DISTWIDTH] IN BLOOD BY AUTOMATED COUNT: 17.9 % (ref 11.5–14.5)
ERYTHROCYTE [DISTWIDTH] IN BLOOD BY AUTOMATED COUNT: 18.3 % (ref 11.5–14.5)
ERYTHROCYTE [DISTWIDTH] IN BLOOD BY AUTOMATED COUNT: 18.3 % (ref 11.5–14.5)
ERYTHROCYTE [DISTWIDTH] IN BLOOD BY AUTOMATED COUNT: 19.9 % (ref 11.5–14.5)
ERYTHROCYTE [DISTWIDTH] IN BLOOD BY AUTOMATED COUNT: 20.3 % (ref 11.5–14.5)
FERRITIN SERPL-MCNC: 408.4 NG/ML (ref 30–400)
FIBRINOGEN PPP-MCNC: 487 MG/DL (ref 219–464)
FLUAV H1 2009 PAND RNA NPH QL NAA+PROBE: NOT DETECTED
FLUAV H1 HA GENE NPH QL NAA+PROBE: NOT DETECTED
FLUAV H3 RNA NPH QL NAA+PROBE: NOT DETECTED
FLUAV SUBTYP SPEC NAA+PROBE: NOT DETECTED
FLUBV RNA ISLT QL NAA+PROBE: NOT DETECTED
FSP PPP LA-ACNC: ABNORMAL
GAS FLOW AIRWAY: 15 LPM
GAS FLOW AIRWAY: 2 LPM
GFR SERPL CREATININE-BSD FRML MDRD: 18 ML/MIN/1.73
GFR SERPL CREATININE-BSD FRML MDRD: 23 ML/MIN/1.73
GFR SERPL CREATININE-BSD FRML MDRD: 24 ML/MIN/1.73
GFR SERPL CREATININE-BSD FRML MDRD: 25 ML/MIN/1.73
GFR SERPL CREATININE-BSD FRML MDRD: 26 ML/MIN/1.73
GFR SERPL CREATININE-BSD FRML MDRD: 27 ML/MIN/1.73
GFR SERPL CREATININE-BSD FRML MDRD: 28 ML/MIN/1.73
GFR SERPL CREATININE-BSD FRML MDRD: 28 ML/MIN/1.73
GFR SERPL CREATININE-BSD FRML MDRD: 33 ML/MIN/1.73
GFR SERPL CREATININE-BSD FRML MDRD: 37 ML/MIN/1.73
GLOBULIN SER CALC-MCNC: 3.8 GM/DL
GLOBULIN UR ELPH-MCNC: 3.3 GM/DL
GLOBULIN UR ELPH-MCNC: 3.5 GM/DL
GLOBULIN UR ELPH-MCNC: 3.5 GM/DL
GLOBULIN UR ELPH-MCNC: 3.6 GM/DL
GLOBULIN UR ELPH-MCNC: 3.9 GM/DL
GLOBULIN UR ELPH-MCNC: 4.3 GM/DL
GLOBULIN UR ELPH-MCNC: 4.4 GM/DL
GLUCOSE BLD-MCNC: 104 MG/DL (ref 65–99)
GLUCOSE BLD-MCNC: 104 MG/DL (ref 65–99)
GLUCOSE BLD-MCNC: 105 MG/DL (ref 65–99)
GLUCOSE BLD-MCNC: 108 MG/DL (ref 65–99)
GLUCOSE BLD-MCNC: 118 MG/DL (ref 65–99)
GLUCOSE BLD-MCNC: 124 MG/DL (ref 65–99)
GLUCOSE BLD-MCNC: 216 MG/DL (ref 65–99)
GLUCOSE BLD-MCNC: 51 MG/DL (ref 65–99)
GLUCOSE BLD-MCNC: 91 MG/DL (ref 65–99)
GLUCOSE BLD-MCNC: 96 MG/DL (ref 65–99)
GLUCOSE BLDC GLUCOMTR-MCNC: 103 MG/DL (ref 70–130)
GLUCOSE BLDC GLUCOMTR-MCNC: 104 MG/DL (ref 70–130)
GLUCOSE BLDC GLUCOMTR-MCNC: 107 MG/DL (ref 70–130)
GLUCOSE BLDC GLUCOMTR-MCNC: 110 MG/DL (ref 70–130)
GLUCOSE BLDC GLUCOMTR-MCNC: 117 MG/DL (ref 70–130)
GLUCOSE BLDC GLUCOMTR-MCNC: 119 MG/DL (ref 70–130)
GLUCOSE BLDC GLUCOMTR-MCNC: 127 MG/DL (ref 70–130)
GLUCOSE BLDC GLUCOMTR-MCNC: 130 MG/DL (ref 70–130)
GLUCOSE BLDC GLUCOMTR-MCNC: 131 MG/DL (ref 70–130)
GLUCOSE BLDC GLUCOMTR-MCNC: 136 MG/DL (ref 70–130)
GLUCOSE BLDC GLUCOMTR-MCNC: 144 MG/DL (ref 70–130)
GLUCOSE BLDC GLUCOMTR-MCNC: 158 MG/DL (ref 70–130)
GLUCOSE BLDC GLUCOMTR-MCNC: 64 MG/DL (ref 70–130)
GLUCOSE BLDC GLUCOMTR-MCNC: 80 MG/DL (ref 70–130)
GLUCOSE BLDC GLUCOMTR-MCNC: 84 MG/DL (ref 70–130)
GLUCOSE BLDC GLUCOMTR-MCNC: 84 MG/DL (ref 70–130)
GLUCOSE BLDC GLUCOMTR-MCNC: 89 MG/DL (ref 70–130)
GLUCOSE BLDC GLUCOMTR-MCNC: 91 MG/DL (ref 70–130)
GLUCOSE BLDC GLUCOMTR-MCNC: 91 MG/DL (ref 70–130)
GLUCOSE SERPL-MCNC: 183 MG/DL (ref 65–99)
GLUCOSE UR STRIP-MCNC: NEGATIVE MG/DL
HADV DNA SPEC NAA+PROBE: NOT DETECTED
HCO3 BLDA-SCNC: 10.2 MMOL/L (ref 22–28)
HCO3 BLDA-SCNC: 6.9 MMOL/L (ref 22–28)
HCOV 229E RNA SPEC QL NAA+PROBE: NOT DETECTED
HCOV HKU1 RNA SPEC QL NAA+PROBE: NOT DETECTED
HCOV NL63 RNA SPEC QL NAA+PROBE: NOT DETECTED
HCOV OC43 RNA SPEC QL NAA+PROBE: NOT DETECTED
HCT VFR BLD AUTO: 27 % (ref 40.4–52.2)
HCT VFR BLD AUTO: 27.7 % (ref 40.4–52.2)
HCT VFR BLD AUTO: 27.8 % (ref 40.4–52.2)
HCT VFR BLD AUTO: 27.9 % (ref 40.4–52.2)
HCT VFR BLD AUTO: 28.4 % (ref 40.4–52.2)
HCT VFR BLD AUTO: 28.9 % (ref 40.4–52.2)
HCT VFR BLD AUTO: 29.2 % (ref 40.4–52.2)
HCT VFR BLD AUTO: 30.8 % (ref 40.4–52.2)
HCT VFR BLD AUTO: 32.8 % (ref 40.4–52.2)
HCT VFR BLD AUTO: 34.9 % (ref 40.4–52.2)
HGB BLD-MCNC: 10 G/DL (ref 13.7–17.6)
HGB BLD-MCNC: 10 G/DL (ref 13.7–17.6)
HGB BLD-MCNC: 7.9 G/DL (ref 13.7–17.6)
HGB BLD-MCNC: 8.3 G/DL (ref 13.7–17.6)
HGB BLD-MCNC: 8.3 G/DL (ref 13.7–17.6)
HGB BLD-MCNC: 8.4 G/DL (ref 13.7–17.6)
HGB BLD-MCNC: 8.5 G/DL (ref 13.7–17.6)
HGB BLD-MCNC: 8.6 G/DL (ref 13.7–17.6)
HGB BLD-MCNC: 8.8 G/DL (ref 13.7–17.6)
HGB BLD-MCNC: 9.3 G/DL (ref 13.7–17.6)
HGB UR QL STRIP.AUTO: ABNORMAL
HMPV RNA NPH QL NAA+NON-PROBE: NOT DETECTED
HOLD SPECIMEN: NORMAL
HPIV1 RNA SPEC QL NAA+PROBE: NOT DETECTED
HPIV2 RNA SPEC QL NAA+PROBE: NOT DETECTED
HPIV3 RNA NPH QL NAA+PROBE: NOT DETECTED
HPIV4 P GENE NPH QL NAA+PROBE: NOT DETECTED
HYALINE CASTS UR QL AUTO: ABNORMAL /LPF
IGA SERPL-MCNC: 316 MG/DL (ref 61–437)
IGG SERPL-MCNC: 926 MG/DL (ref 700–1600)
IGM SERPL-MCNC: 86 MG/DL (ref 15–143)
IMM GRANULOCYTES # BLD: 0.02 10*3/MM3 (ref 0–0.03)
IMM GRANULOCYTES # BLD: 0.03 10*3/MM3 (ref 0–0.03)
IMM GRANULOCYTES # BLD: 0.03 10*3/MM3 (ref 0–0.03)
IMM GRANULOCYTES # BLD: 0.04 10*3/MM3 (ref 0–0.03)
IMM GRANULOCYTES # BLD: 0.13 10*3/MM3 (ref 0–0.03)
IMM GRANULOCYTES # BLD: 0.13 10*3/MM3 (ref 0–0.03)
IMM GRANULOCYTES # BLD: 0.28 10*3/MM3 (ref 0–0.03)
IMM GRANULOCYTES NFR BLD: 0.1 % (ref 0–0.5)
IMM GRANULOCYTES NFR BLD: 0.2 % (ref 0–0.5)
IMM GRANULOCYTES NFR BLD: 0.3 % (ref 0–0.5)
IMM GRANULOCYTES NFR BLD: 0.3 % (ref 0–0.5)
IMM GRANULOCYTES NFR BLD: 0.4 % (ref 0–0.5)
IMM GRANULOCYTES NFR BLD: 0.4 % (ref 0–0.5)
IMM GRANULOCYTES NFR BLD: 0.6 % (ref 0–0.5)
IMM GRANULOCYTES NFR BLD: 1.4 % (ref 0–0.5)
IMM GRANULOCYTES NFR BLD: 2.3 % (ref 0–0.5)
INR PPP: 1.4 (ref 0.9–1.1)
INR PPP: 3.45 (ref 0.9–1.1)
INTERPRETATION UR IFE-IMP: NORMAL
IRON 24H UR-MRATE: 22 MCG/DL (ref 59–158)
IRON SATN MFR SERPL: 10 % (ref 20–50)
KETONES UR QL STRIP: NEGATIVE
LEFT ATRIUM VOLUME INDEX: 27 ML/M2
LEUKOCYTE ESTERASE UR QL STRIP.AUTO: ABNORMAL
LV EF 2D ECHO EST: 30 %
LYMPHOCYTES # BLD AUTO: 0.44 10*3/MM3 (ref 0.9–4.8)
LYMPHOCYTES # BLD AUTO: 0.6 10*3/MM3 (ref 0.9–4.8)
LYMPHOCYTES # BLD AUTO: 0.79 10*3/MM3 (ref 0.9–4.8)
LYMPHOCYTES # BLD AUTO: 0.86 10*3/MM3 (ref 0.9–4.8)
LYMPHOCYTES # BLD AUTO: 0.91 10*3/MM3 (ref 0.9–4.8)
LYMPHOCYTES # BLD AUTO: 0.96 10*3/MM3 (ref 0.9–4.8)
LYMPHOCYTES # BLD AUTO: 1.07 10*3/MM3 (ref 0.9–4.8)
LYMPHOCYTES # BLD AUTO: 1.09 10*3/MM3 (ref 0.9–4.8)
LYMPHOCYTES # BLD AUTO: 1.48 10*3/MM3 (ref 0.9–4.8)
LYMPHOCYTES # BLD MANUAL: 0.75 10*3/MM3 (ref 0.9–4.8)
LYMPHOCYTES NFR BLD AUTO: 10.8 % (ref 19.6–45.3)
LYMPHOCYTES NFR BLD AUTO: 11.5 % (ref 19.6–45.3)
LYMPHOCYTES NFR BLD AUTO: 12.3 % (ref 19.6–45.3)
LYMPHOCYTES NFR BLD AUTO: 12.3 % (ref 19.6–45.3)
LYMPHOCYTES NFR BLD AUTO: 2.1 % (ref 19.6–45.3)
LYMPHOCYTES NFR BLD AUTO: 4.1 % (ref 19.6–45.3)
LYMPHOCYTES NFR BLD AUTO: 4.8 % (ref 19.6–45.3)
LYMPHOCYTES NFR BLD AUTO: 6 % (ref 19.6–45.3)
LYMPHOCYTES NFR BLD AUTO: 9.1 % (ref 19.6–45.3)
LYMPHOCYTES NFR BLD MANUAL: 2 % (ref 19.6–45.3)
LYMPHOCYTES NFR BLD MANUAL: 5 % (ref 5–12)
M PNEUMO IGG SER IA-ACNC: NOT DETECTED
MAGNESIUM SERPL-MCNC: 2.2 MG/DL (ref 1.6–2.4)
MAGNESIUM SERPL-MCNC: 2.2 MG/DL (ref 1.6–2.4)
MAGNESIUM SERPL-MCNC: 2.3 MG/DL (ref 1.6–2.4)
MAGNESIUM SERPL-MCNC: 2.3 MG/DL (ref 1.6–2.4)
MAXIMAL PREDICTED HEART RATE: 131 BPM
MCH RBC QN AUTO: 25 PG (ref 27–32.7)
MCH RBC QN AUTO: 25.3 PG (ref 27–32.7)
MCH RBC QN AUTO: 25.3 PG (ref 27–32.7)
MCH RBC QN AUTO: 25.4 PG (ref 27–32.7)
MCH RBC QN AUTO: 25.4 PG (ref 27–32.7)
MCH RBC QN AUTO: 25.5 PG (ref 27–32.7)
MCH RBC QN AUTO: 25.8 PG (ref 27–32.7)
MCH RBC QN AUTO: 26 PG (ref 27–32.7)
MCHC RBC AUTO-ENTMCNC: 28.5 G/DL (ref 32.6–36.4)
MCHC RBC AUTO-ENTMCNC: 28.7 G/DL (ref 32.6–36.4)
MCHC RBC AUTO-ENTMCNC: 29.8 G/DL (ref 32.6–36.4)
MCHC RBC AUTO-ENTMCNC: 29.9 G/DL (ref 32.6–36.4)
MCHC RBC AUTO-ENTMCNC: 29.9 G/DL (ref 32.6–36.4)
MCHC RBC AUTO-ENTMCNC: 30.1 G/DL (ref 32.6–36.4)
MCHC RBC AUTO-ENTMCNC: 30.1 G/DL (ref 32.6–36.4)
MCHC RBC AUTO-ENTMCNC: 30.2 G/DL (ref 32.6–36.4)
MCHC RBC AUTO-ENTMCNC: 30.5 G/DL (ref 32.6–36.4)
MCHC RBC AUTO-ENTMCNC: 30.7 G/DL (ref 32.6–36.4)
MCV RBC AUTO: 83.5 FL (ref 79.8–96.2)
MCV RBC AUTO: 83.7 FL (ref 79.8–96.2)
MCV RBC AUTO: 83.9 FL (ref 79.8–96.2)
MCV RBC AUTO: 84.6 FL (ref 79.8–96.2)
MCV RBC AUTO: 84.8 FL (ref 79.8–96.2)
MCV RBC AUTO: 85.3 FL (ref 79.8–96.2)
MCV RBC AUTO: 85.4 FL (ref 79.8–96.2)
MCV RBC AUTO: 85.5 FL (ref 79.8–96.2)
MCV RBC AUTO: 88.6 FL (ref 79.8–96.2)
MCV RBC AUTO: 88.8 FL (ref 79.8–96.2)
MODALITY: ABNORMAL
MODALITY: ABNORMAL
MONOCYTES # BLD AUTO: 0.02 10*3/MM3 (ref 0.2–1.2)
MONOCYTES # BLD AUTO: 0.74 10*3/MM3 (ref 0.2–1.2)
MONOCYTES # BLD AUTO: 0.81 10*3/MM3 (ref 0.2–1.2)
MONOCYTES # BLD AUTO: 0.94 10*3/MM3 (ref 0.2–1.2)
MONOCYTES # BLD AUTO: 0.94 10*3/MM3 (ref 0.2–1.2)
MONOCYTES # BLD AUTO: 0.99 10*3/MM3 (ref 0.2–1.2)
MONOCYTES # BLD AUTO: 1.06 10*3/MM3 (ref 0.2–1.2)
MONOCYTES # BLD AUTO: 1.13 10*3/MM3 (ref 0.2–1.2)
MONOCYTES # BLD AUTO: 1.36 10*3/MM3 (ref 0.2–1.2)
MONOCYTES # BLD AUTO: 1.87 10*3/MM3 (ref 0.2–1.2)
MONOCYTES NFR BLD AUTO: 0.1 % (ref 5–12)
MONOCYTES NFR BLD AUTO: 10.1 % (ref 5–12)
MONOCYTES NFR BLD AUTO: 11.9 % (ref 5–12)
MONOCYTES NFR BLD AUTO: 12.5 % (ref 5–12)
MONOCYTES NFR BLD AUTO: 5 % (ref 5–12)
MONOCYTES NFR BLD AUTO: 6.2 % (ref 5–12)
MONOCYTES NFR BLD AUTO: 7 % (ref 5–12)
MONOCYTES NFR BLD AUTO: 9.4 % (ref 5–12)
MONOCYTES NFR BLD AUTO: 9.4 % (ref 5–12)
NEUTROPHILS # BLD AUTO: 12.5 10*3/MM3 (ref 1.9–8.1)
NEUTROPHILS # BLD AUTO: 13.35 10*3/MM3 (ref 1.9–8.1)
NEUTROPHILS # BLD AUTO: 14.65 10*3/MM3 (ref 1.9–8.1)
NEUTROPHILS # BLD AUTO: 20.39 10*3/MM3 (ref 1.9–8.1)
NEUTROPHILS # BLD AUTO: 34.69 10*3/MM3 (ref 1.9–8.1)
NEUTROPHILS # BLD AUTO: 6.02 10*3/MM3 (ref 1.9–8.1)
NEUTROPHILS # BLD AUTO: 6.63 10*3/MM3 (ref 1.9–8.1)
NEUTROPHILS # BLD AUTO: 7.09 10*3/MM3 (ref 1.9–8.1)
NEUTROPHILS # BLD AUTO: 8.78 10*3/MM3 (ref 1.9–8.1)
NEUTROPHILS # BLD AUTO: 9.11 10*3/MM3 (ref 1.9–8.1)
NEUTROPHILS NFR BLD AUTO: 74.7 % (ref 42.7–76)
NEUTROPHILS NFR BLD AUTO: 75.5 % (ref 42.7–76)
NEUTROPHILS NFR BLD AUTO: 75.9 % (ref 42.7–76)
NEUTROPHILS NFR BLD AUTO: 76.2 % (ref 42.7–76)
NEUTROPHILS NFR BLD AUTO: 83 % (ref 42.7–76)
NEUTROPHILS NFR BLD AUTO: 86.3 % (ref 42.7–76)
NEUTROPHILS NFR BLD AUTO: 87.3 % (ref 42.7–76)
NEUTROPHILS NFR BLD AUTO: 89.9 % (ref 42.7–76)
NEUTROPHILS NFR BLD AUTO: 97.2 % (ref 42.7–76)
NEUTROPHILS NFR BLD MANUAL: 75 % (ref 42.7–76)
NEUTS BAND NFR BLD MANUAL: 18 % (ref 0–5)
NITRITE UR QL STRIP: NEGATIVE
NITRITE UR QL STRIP: POSITIVE
NRBC BLD MANUAL-RTO: 0 /100 WBC (ref 0–0)
NT-PROBNP SERPL-MCNC: ABNORMAL PG/ML (ref 0–1800)
NT-PROBNP SERPL-MCNC: ABNORMAL PG/ML (ref 0–1800)
OVALOCYTES BLD QL SMEAR: NORMAL
PCO2 BLDA: 17.2 MM HG (ref 35–45)
PCO2 BLDA: 21.7 MM HG (ref 35–45)
PH BLDA: 7.11 PH UNITS (ref 7.35–7.45)
PH BLDA: 7.38 PH UNITS (ref 7.35–7.45)
PH UR STRIP.AUTO: 5.5 [PH] (ref 5–8)
PH UR STRIP.AUTO: 6.5 [PH] (ref 5–8)
PH UR STRIP.AUTO: 6.5 [PH] (ref 5–8)
PH UR STRIP.AUTO: 7.5 [PH] (ref 5–8)
PHOSPHATE SERPL-MCNC: 3.3 MG/DL (ref 2.5–4.5)
PHOSPHATE SERPL-MCNC: 3.3 MG/DL (ref 2.5–4.5)
PHOSPHATE SERPL-MCNC: 3.8 MG/DL (ref 2.5–4.5)
PLAT MORPH BLD: NORMAL
PLATELET # BLD AUTO: 171 10*3/MM3 (ref 140–500)
PLATELET # BLD AUTO: 181 10*3/MM3 (ref 140–500)
PLATELET # BLD AUTO: 203 10*3/MM3 (ref 140–500)
PLATELET # BLD AUTO: 224 10*3/MM3 (ref 140–500)
PLATELET # BLD AUTO: 227 10*3/MM3 (ref 140–500)
PLATELET # BLD AUTO: 241 10*3/MM3 (ref 140–500)
PLATELET # BLD AUTO: 257 10*3/MM3 (ref 140–500)
PLATELET # BLD AUTO: 270 10*3/MM3 (ref 140–500)
PLATELET # BLD AUTO: 282 10*3/MM3 (ref 140–500)
PLATELET # BLD AUTO: 312 10*3/MM3 (ref 140–500)
PMV BLD AUTO: 10.1 FL (ref 6–12)
PMV BLD AUTO: 10.1 FL (ref 6–12)
PMV BLD AUTO: 10.3 FL (ref 6–12)
PMV BLD AUTO: 10.4 FL (ref 6–12)
PMV BLD AUTO: 10.4 FL (ref 6–12)
PMV BLD AUTO: 10.6 FL (ref 6–12)
PMV BLD AUTO: 10.7 FL (ref 6–12)
PMV BLD AUTO: 11 FL (ref 6–12)
PMV BLD AUTO: 9.5 FL (ref 6–12)
PMV BLD AUTO: 9.6 FL (ref 6–12)
PO2 BLDA: 131.5 MM HG (ref 80–100)
PO2 BLDA: 99.2 MM HG (ref 80–100)
POTASSIUM BLD-SCNC: 3.7 MMOL/L (ref 3.5–5.2)
POTASSIUM BLD-SCNC: 3.8 MMOL/L (ref 3.5–5.2)
POTASSIUM BLD-SCNC: 4.2 MMOL/L (ref 3.5–5.2)
POTASSIUM BLD-SCNC: 4.3 MMOL/L (ref 3.5–5.2)
POTASSIUM BLD-SCNC: 4.4 MMOL/L (ref 3.5–5.2)
POTASSIUM BLD-SCNC: 4.7 MMOL/L (ref 3.5–5.2)
POTASSIUM BLD-SCNC: 5.5 MMOL/L (ref 3.5–5.2)
POTASSIUM SERPL-SCNC: 4.3 MMOL/L (ref 3.5–5.2)
PROCALCITONIN SERPL-MCNC: 16.46 NG/ML (ref 0.1–0.25)
PROCALCITONIN SERPL-MCNC: 2.42 NG/ML (ref 0.1–0.25)
PROT PATTERN SERPL IFE-IMP: NORMAL
PROT SERPL-MCNC: 6.1 G/DL (ref 6–8.5)
PROT SERPL-MCNC: 6.1 G/DL (ref 6–8.5)
PROT SERPL-MCNC: 6.2 G/DL (ref 6–8.5)
PROT SERPL-MCNC: 6.2 G/DL (ref 6–8.5)
PROT SERPL-MCNC: 7.1 G/DL (ref 6–8.5)
PROT SERPL-MCNC: 7.3 G/DL (ref 6–8.5)
PROT UR QL STRIP: ABNORMAL
PROTHROMBIN TIME: 16.7 SECONDS (ref 11.7–14.2)
PROTHROMBIN TIME: 34.2 SECONDS (ref 11.7–14.2)
RBC # BLD AUTO: 3.12 10*6/MM3 (ref 4.6–6)
RBC # BLD AUTO: 3.22 10*6/MM3 (ref 4.6–6)
RBC # BLD AUTO: 3.26 10*6/MM3 (ref 4.6–6)
RBC # BLD AUTO: 3.29 10*6/MM3 (ref 4.6–6)
RBC # BLD AUTO: 3.38 10*6/MM3 (ref 4.6–6)
RBC # BLD AUTO: 3.4 10*6/MM3 (ref 4.6–6)
RBC # BLD AUTO: 3.45 10*6/MM3 (ref 4.6–6)
RBC # BLD AUTO: 3.68 10*6/MM3 (ref 4.6–6)
RBC # BLD AUTO: 3.84 10*6/MM3 (ref 4.6–6)
RBC # BLD AUTO: 3.94 10*6/MM3 (ref 4.6–6)
RBC # UR: ABNORMAL /HPF
REF LAB TEST METHOD: ABNORMAL
RH BLD: NEGATIVE
RHINOVIRUS RNA SPEC NAA+PROBE: NOT DETECTED
RSV RNA NPH QL NAA+NON-PROBE: NOT DETECTED
SAO2 % BLDCOA: 97.8 % (ref 92–99)
SAO2 % BLDCOA: 97.8 % (ref 92–99)
SCAN SLIDE: NORMAL
SET MECH RESP RATE: 24
SET MECH RESP RATE: 32
SODIUM BLD-SCNC: 133 MMOL/L (ref 136–145)
SODIUM BLD-SCNC: 138 MMOL/L (ref 136–145)
SODIUM BLD-SCNC: 140 MMOL/L (ref 136–145)
SODIUM BLD-SCNC: 141 MMOL/L (ref 136–145)
SODIUM BLD-SCNC: 141 MMOL/L (ref 136–145)
SODIUM BLD-SCNC: 142 MMOL/L (ref 136–145)
SODIUM BLD-SCNC: 142 MMOL/L (ref 136–145)
SODIUM BLD-SCNC: 144 MMOL/L (ref 136–145)
SODIUM SERPL-SCNC: 141 MMOL/L (ref 136–145)
SODIUM UR-SCNC: 84 MMOL/L
SP GR UR STRIP: 1.01 (ref 1–1.03)
SP GR UR STRIP: 1.02 (ref 1–1.03)
SQUAMOUS #/AREA URNS HPF: ABNORMAL /HPF
STRESS TARGET HR: 111 BPM
TIBC SERPL-MCNC: 221 MCG/DL
TRANSFERRIN SERPL-MCNC: 148 MG/DL (ref 200–360)
TROPONIN T SERPL-MCNC: 0.34 NG/ML (ref 0–0.03)
TROPONIN T SERPL-MCNC: 0.34 NG/ML (ref 0–0.03)
TROPONIN T SERPL-MCNC: 0.42 NG/ML (ref 0–0.03)
TROPONIN T SERPL-MCNC: 0.84 NG/ML (ref 0–0.03)
TROPONIN T SERPL-MCNC: 1.22 NG/ML (ref 0–0.03)
UNIT  ABO: NORMAL
UNIT  ABO: NORMAL
UNIT  RH: NORMAL
UNIT  RH: NORMAL
URATE SERPL-MCNC: 11.7 MG/DL (ref 3.4–7)
URATE SERPL-MCNC: 11.7 MG/DL (ref 3.4–7)
URATE SERPL-MCNC: 12.3 MG/DL (ref 3.4–7)
UROBILINOGEN UR QL STRIP: ABNORMAL
VANCOMYCIN SERPL-MCNC: 16 MCG/ML (ref 5–40)
WBC MORPH BLD: NORMAL
WBC NRBC COR # BLD: 10.57 10*3/MM3 (ref 4.5–10.7)
WBC NRBC COR # BLD: 12.06 10*3/MM3 (ref 4.5–10.7)
WBC NRBC COR # BLD: 14.49 10*3/MM3 (ref 4.5–10.7)
WBC NRBC COR # BLD: 15.26 10*3/MM3 (ref 4.5–10.7)
WBC NRBC COR # BLD: 16.3 10*3/MM3 (ref 4.5–10.7)
WBC NRBC COR # BLD: 20.99 10*3/MM3 (ref 4.5–10.7)
WBC NRBC COR # BLD: 37.3 10*3/MM3 (ref 4.5–10.7)
WBC NRBC COR # BLD: 7.93 10*3/MM3 (ref 4.5–10.7)
WBC NRBC COR # BLD: 8.88 10*3/MM3 (ref 4.5–10.7)
WBC NRBC COR # BLD: 9.31 10*3/MM3 (ref 4.5–10.7)
WBC UR QL AUTO: ABNORMAL /HPF
WHOLE BLOOD HOLD SPECIMEN: NORMAL
WHOLE BLOOD HOLD SPECIMEN: NORMAL

## 2018-01-01 PROCEDURE — 80053 COMPREHEN METABOLIC PANEL: CPT | Performed by: INTERNAL MEDICINE

## 2018-01-01 PROCEDURE — 82803 BLOOD GASES ANY COMBINATION: CPT

## 2018-01-01 PROCEDURE — 99285 EMERGENCY DEPT VISIT HI MDM: CPT

## 2018-01-01 PROCEDURE — 80053 COMPREHEN METABOLIC PANEL: CPT | Performed by: EMERGENCY MEDICINE

## 2018-01-01 PROCEDURE — 87493 C DIFF AMPLIFIED PROBE: CPT | Performed by: INTERNAL MEDICINE

## 2018-01-01 PROCEDURE — 87186 SC STD MICRODIL/AGAR DIL: CPT | Performed by: NURSE PRACTITIONER

## 2018-01-01 PROCEDURE — 25010000002 PERFLUTREN (DEFINITY) 8.476 MG IN SODIUM CHLORIDE 0.9 % 10 ML INJECTION: Performed by: INTERNAL MEDICINE

## 2018-01-01 PROCEDURE — 93005 ELECTROCARDIOGRAM TRACING: CPT | Performed by: INTERNAL MEDICINE

## 2018-01-01 PROCEDURE — 83605 ASSAY OF LACTIC ACID: CPT | Performed by: INTERNAL MEDICINE

## 2018-01-01 PROCEDURE — 97110 THERAPEUTIC EXERCISES: CPT

## 2018-01-01 PROCEDURE — 94799 UNLISTED PULMONARY SVC/PX: CPT

## 2018-01-01 PROCEDURE — 25010000002 PIPERACILLIN SOD-TAZOBACTAM PER 1 G: Performed by: INTERNAL MEDICINE

## 2018-01-01 PROCEDURE — 85025 COMPLETE CBC W/AUTO DIFF WBC: CPT | Performed by: INTERNAL MEDICINE

## 2018-01-01 PROCEDURE — 81001 URINALYSIS AUTO W/SCOPE: CPT | Performed by: UROLOGY

## 2018-01-01 PROCEDURE — 25010000002 ENOXAPARIN PER 10 MG: Performed by: INTERNAL MEDICINE

## 2018-01-01 PROCEDURE — 82728 ASSAY OF FERRITIN: CPT | Performed by: INTERNAL MEDICINE

## 2018-01-01 PROCEDURE — 97162 PT EVAL MOD COMPLEX 30 MIN: CPT

## 2018-01-01 PROCEDURE — 25010000002 PIPERACILLIN SOD-TAZOBACTAM PER 1 G: Performed by: EMERGENCY MEDICINE

## 2018-01-01 PROCEDURE — 25010000002 HEPARIN (PORCINE) PER 1000 UNITS: Performed by: INTERNAL MEDICINE

## 2018-01-01 PROCEDURE — 80202 ASSAY OF VANCOMYCIN: CPT | Performed by: INTERNAL MEDICINE

## 2018-01-01 PROCEDURE — 71045 X-RAY EXAM CHEST 1 VIEW: CPT

## 2018-01-01 PROCEDURE — 85007 BL SMEAR W/DIFF WBC COUNT: CPT | Performed by: INTERNAL MEDICINE

## 2018-01-01 PROCEDURE — 82436 ASSAY OF URINE CHLORIDE: CPT | Performed by: INTERNAL MEDICINE

## 2018-01-01 PROCEDURE — 5A12012 PERFORMANCE OF CARDIAC OUTPUT, SINGLE, MANUAL: ICD-10-PCS | Performed by: INTERNAL MEDICINE

## 2018-01-01 PROCEDURE — G0009 ADMIN PNEUMOCOCCAL VACCINE: HCPCS | Performed by: INTERNAL MEDICINE

## 2018-01-01 PROCEDURE — 87086 URINE CULTURE/COLONY COUNT: CPT | Performed by: INTERNAL MEDICINE

## 2018-01-01 PROCEDURE — 84145 PROCALCITONIN (PCT): CPT | Performed by: INTERNAL MEDICINE

## 2018-01-01 PROCEDURE — 84484 ASSAY OF TROPONIN QUANT: CPT | Performed by: INTERNAL MEDICINE

## 2018-01-01 PROCEDURE — 25010000002 DOBUTAMINE PER 250 MG: Performed by: INTERNAL MEDICINE

## 2018-01-01 PROCEDURE — 81001 URINALYSIS AUTO W/SCOPE: CPT | Performed by: EMERGENCY MEDICINE

## 2018-01-01 PROCEDURE — 25010000002 EPINEPHRINE PF 1 MG/10ML SOLUTION PREFILLED SYRINGE: Performed by: INTERNAL MEDICINE

## 2018-01-01 PROCEDURE — 80048 BASIC METABOLIC PNL TOTAL CA: CPT | Performed by: INTERNAL MEDICINE

## 2018-01-01 PROCEDURE — 87186 SC STD MICRODIL/AGAR DIL: CPT | Performed by: EMERGENCY MEDICINE

## 2018-01-01 PROCEDURE — 85025 COMPLETE CBC W/AUTO DIFF WBC: CPT | Performed by: NURSE PRACTITIONER

## 2018-01-01 PROCEDURE — 99214 OFFICE O/P EST MOD 30 MIN: CPT | Performed by: FAMILY MEDICINE

## 2018-01-01 PROCEDURE — 84550 ASSAY OF BLOOD/URIC ACID: CPT | Performed by: INTERNAL MEDICINE

## 2018-01-01 PROCEDURE — 99291 CRITICAL CARE FIRST HOUR: CPT

## 2018-01-01 PROCEDURE — 25010000002 PNEUMOCOCCAL VAC POLYVALENT PER 0.5 ML: Performed by: INTERNAL MEDICINE

## 2018-01-01 PROCEDURE — 83540 ASSAY OF IRON: CPT | Performed by: INTERNAL MEDICINE

## 2018-01-01 PROCEDURE — 93005 ELECTROCARDIOGRAM TRACING: CPT | Performed by: EMERGENCY MEDICINE

## 2018-01-01 PROCEDURE — 85610 PROTHROMBIN TIME: CPT | Performed by: NURSE PRACTITIONER

## 2018-01-01 PROCEDURE — 83735 ASSAY OF MAGNESIUM: CPT | Performed by: INTERNAL MEDICINE

## 2018-01-01 PROCEDURE — 87040 BLOOD CULTURE FOR BACTERIA: CPT | Performed by: EMERGENCY MEDICINE

## 2018-01-01 PROCEDURE — 90732 PPSV23 VACC 2 YRS+ SUBQ/IM: CPT | Performed by: INTERNAL MEDICINE

## 2018-01-01 PROCEDURE — 25010000002 IRON SUCROSE PER 1 MG: Performed by: INTERNAL MEDICINE

## 2018-01-01 PROCEDURE — 80053 COMPREHEN METABOLIC PANEL: CPT | Performed by: NURSE PRACTITIONER

## 2018-01-01 PROCEDURE — 82570 ASSAY OF URINE CREATININE: CPT | Performed by: INTERNAL MEDICINE

## 2018-01-01 PROCEDURE — 87633 RESP VIRUS 12-25 TARGETS: CPT | Performed by: EMERGENCY MEDICINE

## 2018-01-01 PROCEDURE — 84484 ASSAY OF TROPONIN QUANT: CPT | Performed by: EMERGENCY MEDICINE

## 2018-01-01 PROCEDURE — 85379 FIBRIN DEGRADATION QUANT: CPT | Performed by: INTERNAL MEDICINE

## 2018-01-01 PROCEDURE — 82962 GLUCOSE BLOOD TEST: CPT

## 2018-01-01 PROCEDURE — 86920 COMPATIBILITY TEST SPIN: CPT

## 2018-01-01 PROCEDURE — 84100 ASSAY OF PHOSPHORUS: CPT | Performed by: INTERNAL MEDICINE

## 2018-01-01 PROCEDURE — 87798 DETECT AGENT NOS DNA AMP: CPT | Performed by: EMERGENCY MEDICINE

## 2018-01-01 PROCEDURE — 93306 TTE W/DOPPLER COMPLETE: CPT | Performed by: INTERNAL MEDICINE

## 2018-01-01 PROCEDURE — 85025 COMPLETE CBC W/AUTO DIFF WBC: CPT | Performed by: EMERGENCY MEDICINE

## 2018-01-01 PROCEDURE — 99233 SBSQ HOSP IP/OBS HIGH 50: CPT | Performed by: INTERNAL MEDICINE

## 2018-01-01 PROCEDURE — 87581 M.PNEUMON DNA AMP PROBE: CPT | Performed by: EMERGENCY MEDICINE

## 2018-01-01 PROCEDURE — 84466 ASSAY OF TRANSFERRIN: CPT | Performed by: INTERNAL MEDICINE

## 2018-01-01 PROCEDURE — 63710000001 DIPHENHYDRAMINE PER 50 MG: Performed by: INTERNAL MEDICINE

## 2018-01-01 PROCEDURE — 99214 OFFICE O/P EST MOD 30 MIN: CPT | Performed by: NURSE PRACTITIONER

## 2018-01-01 PROCEDURE — 25010000002 VANCOMYCIN 10 G RECONSTITUTED SOLUTION: Performed by: EMERGENCY MEDICINE

## 2018-01-01 PROCEDURE — 99222 1ST HOSP IP/OBS MODERATE 55: CPT | Performed by: INTERNAL MEDICINE

## 2018-01-01 PROCEDURE — 25010000002 EPINEPHRINE PF 1 MG/10ML SOLUTION PREFILLED SYRINGE

## 2018-01-01 PROCEDURE — 93010 ELECTROCARDIOGRAM REPORT: CPT | Performed by: INTERNAL MEDICINE

## 2018-01-01 PROCEDURE — 87486 CHLMYD PNEUM DNA AMP PROBE: CPT | Performed by: EMERGENCY MEDICINE

## 2018-01-01 PROCEDURE — 83880 ASSAY OF NATRIURETIC PEPTIDE: CPT | Performed by: EMERGENCY MEDICINE

## 2018-01-01 PROCEDURE — B548ZZA ULTRASONOGRAPHY OF SUPERIOR VENA CAVA, GUIDANCE: ICD-10-PCS | Performed by: INTERNAL MEDICINE

## 2018-01-01 PROCEDURE — 86334 IMMUNOFIX E-PHORESIS SERUM: CPT | Performed by: INTERNAL MEDICINE

## 2018-01-01 PROCEDURE — 87086 URINE CULTURE/COLONY COUNT: CPT | Performed by: NURSE PRACTITIONER

## 2018-01-01 PROCEDURE — 86922 COMPATIBILITY TEST ANTIGLOB: CPT

## 2018-01-01 PROCEDURE — 36600 WITHDRAWAL OF ARTERIAL BLOOD: CPT

## 2018-01-01 PROCEDURE — 81001 URINALYSIS AUTO W/SCOPE: CPT | Performed by: NURSE PRACTITIONER

## 2018-01-01 PROCEDURE — 83880 ASSAY OF NATRIURETIC PEPTIDE: CPT | Performed by: INTERNAL MEDICINE

## 2018-01-01 PROCEDURE — 86901 BLOOD TYPING SEROLOGIC RH(D): CPT | Performed by: NURSE PRACTITIONER

## 2018-01-01 PROCEDURE — 87086 URINE CULTURE/COLONY COUNT: CPT | Performed by: EMERGENCY MEDICINE

## 2018-01-01 PROCEDURE — 74176 CT ABD & PELVIS W/O CONTRAST: CPT

## 2018-01-01 PROCEDURE — 87150 DNA/RNA AMPLIFIED PROBE: CPT | Performed by: EMERGENCY MEDICINE

## 2018-01-01 PROCEDURE — 02HV33Z INSERTION OF INFUSION DEVICE INTO SUPERIOR VENA CAVA, PERCUTANEOUS APPROACH: ICD-10-PCS | Performed by: INTERNAL MEDICINE

## 2018-01-01 PROCEDURE — 83605 ASSAY OF LACTIC ACID: CPT | Performed by: EMERGENCY MEDICINE

## 2018-01-01 PROCEDURE — 86870 RBC ANTIBODY IDENTIFICATION: CPT | Performed by: NURSE PRACTITIONER

## 2018-01-01 PROCEDURE — 85610 PROTHROMBIN TIME: CPT | Performed by: INTERNAL MEDICINE

## 2018-01-01 PROCEDURE — 92950 HEART/LUNG RESUSCITATION CPR: CPT

## 2018-01-01 PROCEDURE — 85362 FIBRIN DEGRADATION PRODUCTS: CPT | Performed by: INTERNAL MEDICINE

## 2018-01-01 PROCEDURE — 86900 BLOOD TYPING SEROLOGIC ABO: CPT | Performed by: NURSE PRACTITIONER

## 2018-01-01 PROCEDURE — 99232 SBSQ HOSP IP/OBS MODERATE 35: CPT | Performed by: INTERNAL MEDICINE

## 2018-01-01 PROCEDURE — 99223 1ST HOSP IP/OBS HIGH 75: CPT | Performed by: INTERNAL MEDICINE

## 2018-01-01 PROCEDURE — 93306 TTE W/DOPPLER COMPLETE: CPT

## 2018-01-01 PROCEDURE — 25010000002 PIPERACILLIN SOD-TAZOBACTAM PER 1 G: Performed by: NURSE PRACTITIONER

## 2018-01-01 PROCEDURE — 83735 ASSAY OF MAGNESIUM: CPT | Performed by: EMERGENCY MEDICINE

## 2018-01-01 PROCEDURE — 99284 EMERGENCY DEPT VISIT MOD MDM: CPT

## 2018-01-01 PROCEDURE — 86850 RBC ANTIBODY SCREEN: CPT | Performed by: NURSE PRACTITIONER

## 2018-01-01 PROCEDURE — 86335 IMMUNFIX E-PHORSIS/URINE/CSF: CPT | Performed by: INTERNAL MEDICINE

## 2018-01-01 PROCEDURE — 85384 FIBRINOGEN ACTIVITY: CPT | Performed by: INTERNAL MEDICINE

## 2018-01-01 PROCEDURE — 84300 ASSAY OF URINE SODIUM: CPT | Performed by: INTERNAL MEDICINE

## 2018-01-01 PROCEDURE — 93000 ELECTROCARDIOGRAM COMPLETE: CPT | Performed by: NURSE PRACTITIONER

## 2018-01-01 RX ORDER — TAMSULOSIN HYDROCHLORIDE 0.4 MG/1
CAPSULE ORAL
COMMUNITY
Start: 2018-01-01 | End: 2018-01-01

## 2018-01-01 RX ORDER — ATORVASTATIN CALCIUM 20 MG/1
40 TABLET, FILM COATED ORAL DAILY
Status: DISCONTINUED | OUTPATIENT
Start: 2018-01-01 | End: 2018-01-01 | Stop reason: HOSPADM

## 2018-01-01 RX ORDER — ZOLPIDEM TARTRATE 5 MG/1
5 TABLET ORAL NIGHTLY PRN
Status: DISCONTINUED | OUTPATIENT
Start: 2018-01-01 | End: 2018-01-01 | Stop reason: HOSPADM

## 2018-01-01 RX ORDER — PHENYLEPHRINE HCL IN 0.9% NACL 0.5 MG/5ML
.5-3 SYRINGE (ML) INTRAVENOUS
Status: DISCONTINUED | OUTPATIENT
Start: 2018-01-01 | End: 2018-01-01 | Stop reason: HOSPADM

## 2018-01-01 RX ORDER — SODIUM CHLORIDE 0.9 % (FLUSH) 0.9 %
10 SYRINGE (ML) INJECTION AS NEEDED
Status: DISCONTINUED | OUTPATIENT
Start: 2018-01-01 | End: 2018-01-01 | Stop reason: HOSPADM

## 2018-01-01 RX ORDER — ACETAMINOPHEN 325 MG/1
650 TABLET ORAL EVERY 4 HOURS PRN
Status: DISCONTINUED | OUTPATIENT
Start: 2018-01-01 | End: 2018-01-01 | Stop reason: HOSPADM

## 2018-01-01 RX ORDER — SIMVASTATIN 80 MG
80 TABLET ORAL EVERY EVENING
COMMUNITY

## 2018-01-01 RX ORDER — TRAMADOL HYDROCHLORIDE 50 MG/1
25 TABLET ORAL EVERY 8 HOURS PRN
COMMUNITY

## 2018-01-01 RX ORDER — NICOTINE POLACRILEX 4 MG
15 LOZENGE BUCCAL
Status: DISCONTINUED | OUTPATIENT
Start: 2018-01-01 | End: 2018-01-01 | Stop reason: HOSPADM

## 2018-01-01 RX ORDER — DIPHENHYDRAMINE HCL 25 MG
50 CAPSULE ORAL ONCE
Status: COMPLETED | OUTPATIENT
Start: 2018-01-01 | End: 2018-01-01

## 2018-01-01 RX ORDER — ISOSORBIDE MONONITRATE 30 MG/1
30 TABLET, EXTENDED RELEASE ORAL
Status: DISCONTINUED | OUTPATIENT
Start: 2018-01-01 | End: 2018-01-01 | Stop reason: HOSPADM

## 2018-01-01 RX ORDER — DOBUTAMINE HYDROCHLORIDE 100 MG/100ML
2-20 INJECTION INTRAVENOUS
Status: DISCONTINUED | OUTPATIENT
Start: 2018-01-01 | End: 2018-01-01

## 2018-01-01 RX ORDER — ATROPINE SULFATE 1 MG/ML
INJECTION, SOLUTION INTRAMUSCULAR; INTRAVENOUS; SUBCUTANEOUS
Status: COMPLETED | OUTPATIENT
Start: 2018-01-01 | End: 2018-01-01

## 2018-01-01 RX ORDER — DEXTROSE MONOHYDRATE 25 G/50ML
25 INJECTION, SOLUTION INTRAVENOUS
Status: DISCONTINUED | OUTPATIENT
Start: 2018-01-01 | End: 2018-01-01 | Stop reason: HOSPADM

## 2018-01-01 RX ORDER — DOPAMINE HYDROCHLORIDE 160 MG/100ML
2-20 INJECTION, SOLUTION INTRAVENOUS
Status: DISCONTINUED | OUTPATIENT
Start: 2018-01-01 | End: 2018-01-01 | Stop reason: HOSPADM

## 2018-01-01 RX ORDER — TRAMADOL HYDROCHLORIDE 50 MG/1
TABLET ORAL
Qty: 45 TABLET | Refills: 0 | OUTPATIENT
Start: 2018-01-01 | End: 2018-01-01 | Stop reason: SDUPTHER

## 2018-01-01 RX ORDER — DEXTROSE MONOHYDRATE 25 G/50ML
INJECTION, SOLUTION INTRAVENOUS
Status: COMPLETED
Start: 2018-01-01 | End: 2018-01-01

## 2018-01-01 RX ORDER — AMOXICILLIN AND CLAVULANATE POTASSIUM 500; 125 MG/1; MG/1
1 TABLET, FILM COATED ORAL EVERY 12 HOURS SCHEDULED
Qty: 1 TABLET | Refills: 0 | Status: SHIPPED | OUTPATIENT
Start: 2018-01-01 | End: 2018-01-01

## 2018-01-01 RX ORDER — BISACODYL 10 MG
10 SUPPOSITORY, RECTAL RECTAL DAILY
Status: DISCONTINUED | OUTPATIENT
Start: 2018-01-01 | End: 2018-01-01

## 2018-01-01 RX ORDER — DEXTROSE, SODIUM CHLORIDE, SODIUM LACTATE, POTASSIUM CHLORIDE, AND CALCIUM CHLORIDE 5; .6; .31; .03; .02 G/100ML; G/100ML; G/100ML; G/100ML; G/100ML
125 INJECTION, SOLUTION INTRAVENOUS CONTINUOUS
Status: DISCONTINUED | OUTPATIENT
Start: 2018-01-01 | End: 2018-01-01 | Stop reason: HOSPADM

## 2018-01-01 RX ORDER — SENNA AND DOCUSATE SODIUM 50; 8.6 MG/1; MG/1
2 TABLET, FILM COATED ORAL 2 TIMES DAILY
Status: DISCONTINUED | OUTPATIENT
Start: 2018-01-01 | End: 2018-01-01 | Stop reason: HOSPADM

## 2018-01-01 RX ORDER — HEPARIN SODIUM 5000 [USP'U]/ML
5000 INJECTION, SOLUTION INTRAVENOUS; SUBCUTANEOUS EVERY 12 HOURS SCHEDULED
Status: DISCONTINUED | OUTPATIENT
Start: 2018-01-01 | End: 2018-01-01 | Stop reason: HOSPADM

## 2018-01-01 RX ORDER — ONDANSETRON 2 MG/ML
4 INJECTION INTRAMUSCULAR; INTRAVENOUS EVERY 6 HOURS PRN
Status: DISCONTINUED | OUTPATIENT
Start: 2018-01-01 | End: 2018-01-01 | Stop reason: HOSPADM

## 2018-01-01 RX ORDER — ISOSORBIDE MONONITRATE 30 MG/1
30 TABLET, EXTENDED RELEASE ORAL
Qty: 30 TABLET | Refills: 0 | Status: SHIPPED | OUTPATIENT
Start: 2018-01-01 | End: 2018-01-01 | Stop reason: SDUPTHER

## 2018-01-01 RX ORDER — TORSEMIDE 20 MG/1
40 TABLET ORAL DAILY
Status: DISCONTINUED | OUTPATIENT
Start: 2018-01-01 | End: 2018-01-01 | Stop reason: HOSPADM

## 2018-01-01 RX ORDER — ASPIRIN 81 MG/1
81 TABLET ORAL DAILY
Qty: 100 TABLET | Refills: 0 | Status: SHIPPED | OUTPATIENT
Start: 2018-01-01

## 2018-01-01 RX ORDER — SODIUM CHLORIDE 9 MG/ML
125 INJECTION, SOLUTION INTRAVENOUS CONTINUOUS
Status: DISCONTINUED | OUTPATIENT
Start: 2018-01-01 | End: 2018-01-01

## 2018-01-01 RX ORDER — TRAMADOL HYDROCHLORIDE 50 MG/1
50 TABLET ORAL EVERY 8 HOURS PRN
Status: DISCONTINUED | OUTPATIENT
Start: 2018-01-01 | End: 2018-01-01 | Stop reason: HOSPADM

## 2018-01-01 RX ORDER — FINASTERIDE 5 MG/1
TABLET, FILM COATED ORAL
COMMUNITY
Start: 2018-01-01 | End: 2018-01-01

## 2018-01-01 RX ORDER — ASPIRIN 81 MG/1
81 TABLET ORAL DAILY
Status: DISCONTINUED | OUTPATIENT
Start: 2018-01-01 | End: 2018-01-01 | Stop reason: HOSPADM

## 2018-01-01 RX ORDER — SENNA AND DOCUSATE SODIUM 50; 8.6 MG/1; MG/1
2 TABLET, FILM COATED ORAL 2 TIMES DAILY
Qty: 30 TABLET | Refills: 1 | Status: SHIPPED | OUTPATIENT
Start: 2018-01-01

## 2018-01-01 RX ORDER — AMOXICILLIN AND CLAVULANATE POTASSIUM 500; 125 MG/1; MG/1
1 TABLET, FILM COATED ORAL EVERY 12 HOURS SCHEDULED
Status: DISCONTINUED | OUTPATIENT
Start: 2018-01-01 | End: 2018-01-01 | Stop reason: HOSPADM

## 2018-01-01 RX ORDER — TORSEMIDE 20 MG/1
20 TABLET ORAL DAILY
Qty: 60 TABLET | Refills: 0 | Status: SHIPPED | OUTPATIENT
Start: 2018-01-01

## 2018-01-01 RX ORDER — DIPHENHYDRAMINE HCL 25 MG
25 CAPSULE ORAL NIGHTLY PRN
Status: DISCONTINUED | OUTPATIENT
Start: 2018-01-01 | End: 2018-01-01 | Stop reason: HOSPADM

## 2018-01-01 RX ORDER — HEPARIN SODIUM 5000 [USP'U]/ML
5000 INJECTION, SOLUTION INTRAVENOUS; SUBCUTANEOUS EVERY 12 HOURS SCHEDULED
Status: DISCONTINUED | OUTPATIENT
Start: 2018-01-01 | End: 2018-01-01

## 2018-01-01 RX ORDER — LISINOPRIL 20 MG/1
TABLET ORAL
COMMUNITY
Start: 2018-01-01 | End: 2018-01-01

## 2018-01-01 RX ORDER — SODIUM CHLORIDE 0.9 % (FLUSH) 0.9 %
1-10 SYRINGE (ML) INJECTION AS NEEDED
Status: DISCONTINUED | OUTPATIENT
Start: 2018-01-01 | End: 2018-01-01 | Stop reason: HOSPADM

## 2018-01-01 RX ORDER — FAMOTIDINE 10 MG/ML
20 INJECTION, SOLUTION INTRAVENOUS DAILY
Status: DISCONTINUED | OUTPATIENT
Start: 2018-01-01 | End: 2018-01-01 | Stop reason: HOSPADM

## 2018-01-01 RX ORDER — ACETAMINOPHEN 325 MG/1
650 TABLET ORAL ONCE
Status: COMPLETED | OUTPATIENT
Start: 2018-01-01 | End: 2018-01-01

## 2018-01-01 RX ORDER — AMOXICILLIN AND CLAVULANATE POTASSIUM 500; 125 MG/1; MG/1
1 TABLET, FILM COATED ORAL EVERY 12 HOURS SCHEDULED
Qty: 10 TABLET | Refills: 0 | Status: SHIPPED | OUTPATIENT
Start: 2018-01-01 | End: 2018-01-01

## 2018-01-01 RX ORDER — TORSEMIDE 20 MG/1
40 TABLET ORAL DAILY
Qty: 60 TABLET | Refills: 0 | Status: ON HOLD | OUTPATIENT
Start: 2018-01-01 | End: 2018-01-01

## 2018-01-01 RX ORDER — TRAMADOL HYDROCHLORIDE 50 MG/1
TABLET ORAL
Qty: 45 TABLET | Refills: 0 | Status: SHIPPED | OUTPATIENT
Start: 2018-01-01 | End: 2018-01-01 | Stop reason: SDUPTHER

## 2018-01-01 RX ORDER — ISOSORBIDE MONONITRATE 30 MG/1
30 TABLET, EXTENDED RELEASE ORAL
Qty: 30 TABLET | Refills: 3 | Status: SHIPPED | OUTPATIENT
Start: 2018-01-01

## 2018-01-01 RX ADMIN — AMOXICILLIN AND CLAVULANATE POTASSIUM 500 MG: 500; 125 TABLET, FILM COATED ORAL at 20:32

## 2018-01-01 RX ADMIN — DOCUSATE SODIUM -SENNOSIDES 2 TABLET: 50; 8.6 TABLET, COATED ORAL at 20:49

## 2018-01-01 RX ADMIN — ISOSORBIDE MONONITRATE 30 MG: 30 TABLET ORAL at 08:37

## 2018-01-01 RX ADMIN — ZOLPIDEM TARTRATE 5 MG: 5 TABLET ORAL at 23:29

## 2018-01-01 RX ADMIN — VANCOMYCIN HYDROCHLORIDE 1250 MG: 10 INJECTION, POWDER, LYOPHILIZED, FOR SOLUTION INTRAVENOUS at 02:19

## 2018-01-01 RX ADMIN — Medication 0.5 MCG/KG/MIN: at 10:03

## 2018-01-01 RX ADMIN — ZOLPIDEM TARTRATE 5 MG: 5 TABLET ORAL at 22:09

## 2018-01-01 RX ADMIN — HEPARIN SODIUM 5000 UNITS: 5000 INJECTION, SOLUTION INTRAVENOUS; SUBCUTANEOUS at 20:49

## 2018-01-01 RX ADMIN — ATROPINE SULFATE 1 MG: 1 INJECTION, SOLUTION INTRAMUSCULAR; INTRAVENOUS; SUBCUTANEOUS at 10:36

## 2018-01-01 RX ADMIN — DOBUTAMINE HYDROCHLORIDE 12 MCG/KG/MIN: 100 INJECTION INTRAVENOUS at 10:00

## 2018-01-01 RX ADMIN — ISOSORBIDE MONONITRATE 30 MG: 30 TABLET ORAL at 09:27

## 2018-01-01 RX ADMIN — ZOLPIDEM TARTRATE 5 MG: 5 TABLET ORAL at 21:49

## 2018-01-01 RX ADMIN — TAZOBACTAM SODIUM AND PIPERACILLIN SODIUM 3.38 G: 375; 3 INJECTION, SOLUTION INTRAVENOUS at 12:57

## 2018-01-01 RX ADMIN — ASPIRIN 81 MG: 81 TABLET ORAL at 09:27

## 2018-01-01 RX ADMIN — ENOXAPARIN SODIUM 70 MG: 80 INJECTION SUBCUTANEOUS at 08:38

## 2018-01-01 RX ADMIN — SODIUM BICARBONATE 75 ML/HR: 84 INJECTION, SOLUTION INTRAVENOUS at 17:19

## 2018-01-01 RX ADMIN — ZOLPIDEM TARTRATE 5 MG: 5 TABLET ORAL at 20:32

## 2018-01-01 RX ADMIN — SODIUM CHLORIDE 1000 ML: 9 INJECTION, SOLUTION INTRAVENOUS at 12:39

## 2018-01-01 RX ADMIN — TORSEMIDE 40 MG: 20 TABLET ORAL at 13:59

## 2018-01-01 RX ADMIN — HEPARIN SODIUM 5000 UNITS: 5000 INJECTION, SOLUTION INTRAVENOUS; SUBCUTANEOUS at 09:27

## 2018-01-01 RX ADMIN — TAZOBACTAM SODIUM AND PIPERACILLIN SODIUM 3.38 G: 375; 3 INJECTION, SOLUTION INTRAVENOUS at 05:04

## 2018-01-01 RX ADMIN — PERFLUTREN 3 ML: 6.52 INJECTION, SUSPENSION INTRAVENOUS at 10:40

## 2018-01-01 RX ADMIN — ASPIRIN 81 MG: 81 TABLET ORAL at 10:00

## 2018-01-01 RX ADMIN — SODIUM CHLORIDE 2001 ML: 9 INJECTION, SOLUTION INTRAVENOUS at 02:00

## 2018-01-01 RX ADMIN — ENOXAPARIN SODIUM 70 MG: 80 INJECTION SUBCUTANEOUS at 11:22

## 2018-01-01 RX ADMIN — METOPROLOL TARTRATE 6.25 MG: 25 TABLET ORAL at 08:31

## 2018-01-01 RX ADMIN — Medication 0.3 MCG/KG/MIN: at 09:44

## 2018-01-01 RX ADMIN — IRON SUCROSE 300 MG: 20 INJECTION, SOLUTION INTRAVENOUS at 10:45

## 2018-01-01 RX ADMIN — TAZOBACTAM SODIUM AND PIPERACILLIN SODIUM 3.38 G: 375; 3 INJECTION, SOLUTION INTRAVENOUS at 09:27

## 2018-01-01 RX ADMIN — TAZOBACTAM SODIUM AND PIPERACILLIN SODIUM 3.38 G: 375; 3 INJECTION, SOLUTION INTRAVENOUS at 16:50

## 2018-01-01 RX ADMIN — EPINEPHRINE 0.03 MCG/KG/MIN: 1 INJECTION INTRAMUSCULAR; INTRAVENOUS; SUBCUTANEOUS at 10:45

## 2018-01-01 RX ADMIN — Medication 0.3 MCG/KG/MIN: at 03:37

## 2018-01-01 RX ADMIN — IRON SUCROSE 300 MG: 20 INJECTION, SOLUTION INTRAVENOUS at 09:53

## 2018-01-01 RX ADMIN — AMOXICILLIN AND CLAVULANATE POTASSIUM 500 MG: 500; 125 TABLET, FILM COATED ORAL at 21:44

## 2018-01-01 RX ADMIN — ISOSORBIDE MONONITRATE 30 MG: 30 TABLET ORAL at 08:30

## 2018-01-01 RX ADMIN — POLYETHYLENE GLYCOL 3350 17 G: 17 POWDER, FOR SOLUTION ORAL at 09:27

## 2018-01-01 RX ADMIN — DEXTROSE MONOHYDRATE 25 G: 25 INJECTION, SOLUTION INTRAVENOUS at 08:05

## 2018-01-01 RX ADMIN — TAZOBACTAM SODIUM AND PIPERACILLIN SODIUM 3.38 G: 375; 3 INJECTION, SOLUTION INTRAVENOUS at 20:50

## 2018-01-01 RX ADMIN — HEPARIN SODIUM 5000 UNITS: 5000 INJECTION, SOLUTION INTRAVENOUS; SUBCUTANEOUS at 08:31

## 2018-01-01 RX ADMIN — TRAMADOL HYDROCHLORIDE 50 MG: 50 TABLET, FILM COATED ORAL at 20:49

## 2018-01-01 RX ADMIN — ASPIRIN 81 MG: 81 TABLET ORAL at 11:22

## 2018-01-01 RX ADMIN — ATORVASTATIN CALCIUM 40 MG: 20 TABLET, FILM COATED ORAL at 08:30

## 2018-01-01 RX ADMIN — TAZOBACTAM SODIUM AND PIPERACILLIN SODIUM 3.38 G: 375; 3 INJECTION, SOLUTION INTRAVENOUS at 21:39

## 2018-01-01 RX ADMIN — ACETAMINOPHEN 650 MG: 325 TABLET ORAL at 09:53

## 2018-01-01 RX ADMIN — SODIUM CHLORIDE 1000 ML: 9 INJECTION, SOLUTION INTRAVENOUS at 03:38

## 2018-01-01 RX ADMIN — AZITHROMYCIN DIHYDRATE 500 MG: 500 INJECTION, POWDER, LYOPHILIZED, FOR SOLUTION INTRAVENOUS at 06:57

## 2018-01-01 RX ADMIN — AMOXICILLIN AND CLAVULANATE POTASSIUM 500 MG: 500; 125 TABLET, FILM COATED ORAL at 08:54

## 2018-01-01 RX ADMIN — SODIUM CHLORIDE 1000 ML: 9 INJECTION, SOLUTION INTRAVENOUS at 06:58

## 2018-01-01 RX ADMIN — METOPROLOL TARTRATE 6.25 MG: 25 TABLET ORAL at 20:49

## 2018-01-01 RX ADMIN — TRAMADOL HYDROCHLORIDE 50 MG: 50 TABLET, FILM COATED ORAL at 21:39

## 2018-01-01 RX ADMIN — ASPIRIN 81 MG: 81 TABLET ORAL at 08:37

## 2018-01-01 RX ADMIN — DIPHENHYDRAMINE HYDROCHLORIDE 25 MG: 25 CAPSULE ORAL at 20:49

## 2018-01-01 RX ADMIN — ENOXAPARIN SODIUM 70 MG: 80 INJECTION SUBCUTANEOUS at 12:59

## 2018-01-01 RX ADMIN — ISOSORBIDE MONONITRATE 30 MG: 30 TABLET ORAL at 08:54

## 2018-01-01 RX ADMIN — IRON SUCROSE 300 MG: 20 INJECTION, SOLUTION INTRAVENOUS at 11:17

## 2018-01-01 RX ADMIN — TAZOBACTAM SODIUM AND PIPERACILLIN SODIUM 3.38 G: 375; 3 INJECTION, SOLUTION INTRAVENOUS at 01:50

## 2018-01-01 RX ADMIN — TAZOBACTAM SODIUM AND PIPERACILLIN SODIUM 3.38 G: 375; 3 INJECTION, SOLUTION INTRAVENOUS at 08:32

## 2018-01-01 RX ADMIN — Medication 0.06 MCG/KG/MIN: at 08:05

## 2018-01-01 RX ADMIN — HEPARIN SODIUM 5000 UNITS: 5000 INJECTION, SOLUTION INTRAVENOUS; SUBCUTANEOUS at 21:39

## 2018-01-01 RX ADMIN — SODIUM CHLORIDE 125 ML/HR: 9 INJECTION, SOLUTION INTRAVENOUS at 04:59

## 2018-01-01 RX ADMIN — ATORVASTATIN CALCIUM 40 MG: 20 TABLET, FILM COATED ORAL at 09:27

## 2018-01-01 RX ADMIN — DOCUSATE SODIUM -SENNOSIDES 2 TABLET: 50; 8.6 TABLET, COATED ORAL at 09:26

## 2018-01-01 RX ADMIN — TAZOBACTAM SODIUM AND PIPERACILLIN SODIUM 3.38 G: 375; 3 INJECTION, SOLUTION INTRAVENOUS at 20:30

## 2018-01-01 RX ADMIN — SODIUM BICARBONATE 50 MEQ: 84 INJECTION, SOLUTION INTRAVENOUS at 11:29

## 2018-01-01 RX ADMIN — TAZOBACTAM SODIUM AND PIPERACILLIN SODIUM 3.38 G: 375; 3 INJECTION, SOLUTION INTRAVENOUS at 11:17

## 2018-01-01 RX ADMIN — ENOXAPARIN SODIUM 70 MG: 80 INJECTION SUBCUTANEOUS at 10:29

## 2018-01-01 RX ADMIN — SODIUM CHLORIDE 232 ML: 9 INJECTION, SOLUTION INTRAVENOUS at 05:45

## 2018-01-01 RX ADMIN — PNEUMOCOCCAL VACCINE POLYVALENT 0.5 ML
25; 25; 25; 25; 25; 25; 25; 25; 25; 25; 25; 25; 25; 25; 25; 25; 25; 25; 25; 25; 25; 25; 25 INJECTION, SOLUTION INTRAMUSCULAR; SUBCUTANEOUS at 16:19

## 2018-01-01 RX ADMIN — DIPHENHYDRAMINE HYDROCHLORIDE 25 MG: 25 CAPSULE ORAL at 21:39

## 2018-01-01 RX ADMIN — VASOPRESSIN 0.03 UNITS/MIN: 20 INJECTION INTRAVENOUS at 09:37

## 2018-01-01 RX ADMIN — TAZOBACTAM SODIUM AND PIPERACILLIN SODIUM 3.38 G: 375; 3 INJECTION, SOLUTION INTRAVENOUS at 04:34

## 2018-01-01 RX ADMIN — AMOXICILLIN AND CLAVULANATE POTASSIUM 500 MG: 500; 125 TABLET, FILM COATED ORAL at 10:59

## 2018-01-01 RX ADMIN — ASPIRIN 81 MG: 81 TABLET ORAL at 09:53

## 2018-01-01 RX ADMIN — TORSEMIDE 40 MG: 20 TABLET ORAL at 08:54

## 2018-01-01 RX ADMIN — ASPIRIN 81 MG: 81 TABLET ORAL at 08:54

## 2018-01-01 RX ADMIN — AMOXICILLIN AND CLAVULANATE POTASSIUM 500 MG: 500; 125 TABLET, FILM COATED ORAL at 09:53

## 2018-01-01 RX ADMIN — SODIUM CHLORIDE 1000 ML: 9 INJECTION, SOLUTION INTRAVENOUS at 04:00

## 2018-01-01 RX ADMIN — TAZOBACTAM SODIUM AND PIPERACILLIN SODIUM 3.38 G: 375; 3 INJECTION, SOLUTION INTRAVENOUS at 13:27

## 2018-01-01 RX ADMIN — SODIUM CHLORIDE, SODIUM LACTATE, POTASSIUM CHLORIDE, CALCIUM CHLORIDE AND DEXTROSE MONOHYDRATE 125 ML/HR: 5; 600; 310; 30; 20 INJECTION, SOLUTION INTRAVENOUS at 09:26

## 2018-01-01 RX ADMIN — SODIUM CHLORIDE 500 ML: 9 INJECTION, SOLUTION INTRAVENOUS at 01:39

## 2018-01-01 RX ADMIN — ISOSORBIDE MONONITRATE 30 MG: 30 TABLET ORAL at 09:53

## 2018-01-01 RX ADMIN — ENOXAPARIN SODIUM 70 MG: 80 INJECTION SUBCUTANEOUS at 11:17

## 2018-01-01 RX ADMIN — TAZOBACTAM SODIUM AND PIPERACILLIN SODIUM 3.38 G: 375; 3 INJECTION, SOLUTION INTRAVENOUS at 01:25

## 2018-01-01 RX ADMIN — Medication 0.02 MCG/KG/MIN: at 01:44

## 2018-01-01 RX ADMIN — SODIUM CHLORIDE 125 ML/HR: 9 INJECTION, SOLUTION INTRAVENOUS at 01:42

## 2018-01-01 RX ADMIN — EPINEPHRINE 1 MG: 0.1 INJECTION, SOLUTION ENDOTRACHEAL; INTRACARDIAC; INTRAVENOUS at 10:37

## 2018-01-01 RX ADMIN — DOBUTAMINE HYDROCHLORIDE 2 MCG/KG/MIN: 100 INJECTION INTRAVENOUS at 08:20

## 2018-01-01 RX ADMIN — SODIUM CHLORIDE 1000 ML: 9 INJECTION, SOLUTION INTRAVENOUS at 04:54

## 2018-01-01 RX ADMIN — ASPIRIN 81 MG: 81 TABLET ORAL at 08:30

## 2018-01-01 RX ADMIN — DIPHENHYDRAMINE HYDROCHLORIDE 50 MG: 25 CAPSULE ORAL at 09:53

## 2018-01-02 PROBLEM — A41.9 SEPSIS (HCC): Status: ACTIVE | Noted: 2018-01-01

## 2018-01-02 PROBLEM — N18.30 CHRONIC KIDNEY DISEASE, STAGE 3 (HCC): Chronic | Status: ACTIVE | Noted: 2017-02-13

## 2018-01-02 NOTE — ED NOTES
Pt to ER via EMS from home. He lives with his wife. He fell earlier tonight (around 2300) and EMS helped him back into bed when he signed a refusal to come to the hospital. Then, recently he called them back to bring him here for dizziness and nausea. Daughter to meet him here.     Kaitlynn Vazquez RN  01/02/18 0240       Kaitlynn Vazquez RN  01/02/18 0245

## 2018-01-02 NOTE — CONSULTS
Patient Name: Imtiaz Jones  :1928  89 y.o.    Date of Admission: 2018  Date of Consultation:  18  Encounter Provider: Carl Knowles III, MD  Place of Service: [facility] CARDIOLOGY  Referring Provider: Leandro Coy MD  Patient Care Team:  Charles Rose MD as PCP - General (Family Medicine)  Martita Grimm RN as Care Coordinator (Population Health)      Chief complaint: Dizziness s/p fall     History of Present Illness:   Mr. Imtiaz Jones is an 89 year old male patient of mine with a history of CAD s/p CABG in  and  (stress test 10/2015 negative), HTN, pulmonary HTN, hyperlipidemia, anemia, CKD, GI bleed, bladder cancer s/p transurethral resection of bladder tumor and type 2 diabetes mellitus. He is a former smoker for 30 years.     He was hospitalized on 2017 for near syncope and it was observed on the cardiac monitor that patient went into atrial fibrillation briefly at this time before converting back to normal sinus rhythm. He was ultimately not anticoagulated at this time for current GI bleed. He had an ECHO which showed moderate tricuspid valve regurgitation, mild-to-moderate mitral valve regurgitation and mild pulmonic valve regurgitation with an EF of 36%. He also had a Holter monitor for 24 hours which showed the predominant rhythm as normal sinus, occasional PACs and nonsustained atrial tachycardia with the longest lasting 4 beats (Average HR: 72. Min HR: 42. Max HR: 103).      He was last seen in the office by me on 2017 for follow-up from cardiac standpoint. Patient reported that nephrology discontinued lisinopril and diuretics and his BP had been running high. Patient denied chest pain, SOB or palpitations at this time. He was in NSR at this time. The plan was to start hydralazine and follow-up in a year.    Patient presented to the ED via EMS s/p fall. He reported falling out of bed at 2200 after he became dizzy and nauseated.  He stated he was unable to rise and called EMS - he denied coming to the ED. He later became dizzy again and called EMS. Patient reported a history of atrial fibrillation - no anticoagulation because of severe reoccuring hematuria and GI bleeds. He denied LOC, chest pain, SOB, or abdominal pain. Labs included Troponin 0.342, Cr 2.3, BUN 45, ALT 61, , Hbg 10, Hct 32.8, Lactate 3.3 and WBC 14.49. CXR showed infiltrate in the left lung base. Patient was admitted by pulmonology for sepsis, pneumonia, UTI and acute renal failure. He had three EKG's performed - it was documented that the second (junctional escape rhythm) and third (atrial fibrillation) were abnormal.     We were consulted for EKG changes and elevated troponin. He currently denies chest pain, palpitations, dizziness or SOB.     Previous Testing:    Holter Monitor 2/17/2017      ECHO 2/17/2017      Stress Test 10/9/2015      Past Medical History:   Diagnosis Date   • Acute arthritis    • Acute renal insufficiency    • Anemia    • Angiodysplasia     of colon   • Atherosclerosis of native coronary artery of native heart without angina pectoris    • Atopic dermatitis    • BPH (benign prostatic hyperplasia)    • Cardiomyopathy 3/20/2017   • Carotid artery disease     HAD ABBEY ENDARTERECTOMY   • CKD (chronic kidney disease)    • Coronary artery disease    • Diabetes mellitus, type II     TYPE 2   • Edema    • Elbow pain    • Gout    • Hematuria     HISTORY REASON WHY SURGERY WAS CANCELED 2 WEEKS AGO   • Hyperlipidemia    • Hypertension     OFF MEDICATION FOR NOW.    • Joint effusion of elbow    • Lightheadedness    • Malignant neoplasm of bladder     ALSO CANCER ON LT LEG   • Near syncope    • Nephrolithiasis    • Neuralgia    • Psychogenic dysuria    • Pulmonary HTN 3/20/2017   • Renal insufficiency syndrome    • Skin avulsion    • Urinary retention    • UTI (urinary tract infection)        Past Surgical History:   Procedure Laterality Date   • CAROTID  ENDARTERECTOMY Bilateral    • CATARACT EXTRACTION Bilateral    • COLONOSCOPY      Complete   • COLONOSCOPY N/A 2/15/2017    Procedure: COLONOSCOPY to cecum and TI with cold and hot snare polypectomies;  Surgeon: Jae Rosado MD;  [facility];  Service:    • CORONARY ARTERY BYPASS GRAFT  1980'S & 2003    2 different surgeries BOTH SURGERY 3 VESSELS DONE   • ENDOSCOPY N/A 2/15/2017    Procedure: ESOPHAGOGASTRODUODENOSCOPY with biopsies;  Surgeon: Jae Rosado MD;  [facility];  Service:    • GALLBLADDER SURGERY     • DE DRAIN LOWER LEG DEEP ABSC/HEMATOMA Left 7/14/2016    Procedure: LOWER EXTREMITY DEBRIDEMENT, EXCISION OF LT LEG SKIN TUMOR WITH FULL THICKNESS SKIN GRAFT RECONSTRUCTION;  Surgeon: Molina Jackson MD;  Location: [facility] MAIN OR;  Service: Plastics   • TRANSURETHRAL RESECTION OF BLADDER TUMOR N/A 4/10/2017    Procedure: CYSTOSCOPY WITH CLOT EVACUATION;  Surgeon: Vel Rogel MD;  Location: [facility] MAIN OR;  Service:          Prior to Admission medications    Medication Sig Start Date End Date Taking? Authorizing Provider   finasteride (PROSCAR) 5 MG tablet Take 1 tablet by mouth daily. 3/19/14   Historical Provider, MD   furosemide (LASIX) 20 MG tablet TAKE 1 TABLET DAILY 12/11/17   Charles Rose MD   hydrALAZINE (APRESOLINE) 25 MG tablet Take 1 tablet by mouth 2 (Two) Times a Day. 9/26/17   Carl Knowles III, MD   IRON PO Take 1 tablet by mouth 3 (Three) Times a Day.    Historical Provider, MD   simvastatin (ZOCOR) 80 MG tablet TAKE 1 TABLET DAILY IN THE EVENING 6/13/16   Sathish Burden MD   tamsulosin (FLOMAX) 0.4 MG capsule Take 1 capsule by mouth every night. 2/13/15   Historical Provider, MD   traMADol (ULTRAM) 50 MG tablet 1/2 pill every 8 hours as needed for testicle pain 12/19/17   Charles Rose MD       Allergies   Allergen Reactions   • Iodine Hives   • Keflex [Cephalexin] Hives       Social History     Social History   • Marital status:       Spouse name: N/A   • Number of children: 4   • Years of education: N/A     Occupational History   • retired      Social History Main Topics   • Smoking status: Former Smoker     Packs/day: 0.50     Years: 30.00     Types: Cigarettes, Pipe, Cigars   • Smokeless tobacco: Never Used      Comment: 25 YR AGO   • Alcohol use No      Comment: caffeine use   • Drug use: No   • Sexual activity: Defer     Other Topics Concern   • None     Social History Narrative       Family History   Problem Relation Age of Onset   • Heart failure Mother      congestive   • Lung cancer Father    • Diabetes Brother      mellitus       REVIEW OF SYSTEMS:   All systems reviewed.  Pertinent positives identified in HPI.  All other systems are negative.      Objective:     Vitals:    01/02/18 0503 01/02/18 0545 01/02/18 0618 01/02/18 0632   BP: 104/56 97/58 94/79 100/56   Patient Position:       Pulse: 80 75 76 77   Resp: 16 16 16 16   Temp:   98.1 °F (36.7 °C)    TempSrc:   Oral    SpO2: 98% 95% 97% 96%   Weight:       Height:         Body mass index is 23.53 kg/(m^2).    Physical Exam:  General Appearance:    Alert, cooperative, in no acute distress   Head:    Normocephalic, without obvious abnormality, atraumatic   Eyes:            Lids and lashes normal, conjunctivae and sclerae normal, no   icterus, no pallor, corneas clear, PERRLA   Ears:    Ears appear intact with no abnormalities noted   Throat:   No oral lesions, no thrush, oral mucosa moist   Neck:   No adenopathy, supple, trachea midline, no thyromegaly, no   carotid bruit, no JVD   Back:     No kyphosis present, no scoliosis present, no skin lesions, erythema or scars, no tenderness to percussion or palpation, range of motion normal   Lungs:     Coarse BS,respirations regular, even and unlabored    Heart:    Regular rhythm and normal rate, normal S1 and S2, no murmur, no gallop, no rub, no click   Chest Wall:    No abnormalities observed   Abdomen:     Normal bowel sounds,  no masses, no organomegaly, soft        non-tender, non-distended, no guarding, no rebound  tenderness   Extremities:   Moves all extremities well, no edema, no cyanosis, no redness   Pulses:   Pulses palpable and equal bilaterally. Normal radial, carotid, femoral, dorsalis pedis and posterior tibial pulses bilaterally. Normal abdominal aorta   Skin:  Psychiatric:   No bleeding, bruising or rash    Alert and oriented x 3, normal mood and affect         Lab Review:       Results from last 7 days  Lab Units 01/02/18  0354   SODIUM mmol/L 141   POTASSIUM mmol/L 4.7   CHLORIDE mmol/L 103   CO2 mmol/L 19.7*   BUN mg/dL 45*   CREATININE mg/dL 2.30*   CALCIUM mg/dL 9.1   BILIRUBIN mg/dL 0.5   ALK PHOS U/L 80   ALT (SGPT) U/L 61*   AST (SGOT) U/L 102*   GLUCOSE mg/dL 124*       Results from last 7 days  Lab Units 01/02/18  0354   TROPONIN T ng/mL 0.342*       Results from last 7 days  Lab Units 01/02/18  0354   WBC 10*3/mm3 14.49*   HEMOGLOBIN g/dL 10.0*   HEMATOCRIT % 32.8*   PLATELETS 10*3/mm3 181           Results from last 7 days  Lab Units 01/02/18  0354   MAGNESIUM mg/dL 2.2           EKG 1/2/2018          EKG 1/2/2018      EKG 1/2/2018      EKG 2/12/2017      I personally viewed and interpreted the patient's EKG/Telemetry data.        Assessment and Plan:       Active Hospital Problems (** Indicates Principal Problem)    Diagnosis Date Noted   • **Sepsis [A41.9] 01/02/2018   • Cardiomyopathy [I42.9] 03/20/2017   • Chronic combined systolic and diastolic heart failure [I50.42] 02/17/2017   • Atrial fibrillation [I48.91] 02/16/2017   • Chronic kidney disease, stage 3 [N18.3] 02/13/2017   • Hypertension [I10] 03/22/2016   • Atherosclerosis of coronary artery [I25.10] 03/22/2016   • Hyperlipidemia [E78.5] 03/22/2016   • Type 2 diabetes mellitus with hyperglycemia [E11.65] 03/22/2016      Resolved Hospital Problems    Diagnosis Date Noted Date Resolved   No resolved problems to display.     1.  Sepsis-continue current  therapy.  2.  Shock-improved with hydration/fluid bolus, and currently on low-dose of norepinephrine, continued.  We'll assess with the echocardiogram decide if further fluid hydration as necessary.  3.  EKG at 4 AM shows anterior ST segment changes consistent with acute ischemia.  Patient currently denies any chest pain.  Troponin was elevated on arrival.  We'll repeat a stat troponin and EKG.  No nitrates or beta blockers due to shock and prior heart block.  We'll add aspirin and Lovenox.  4.  Cardiomyopathy with a prior ejection fraction 36%-we'll repeat an echocardiogram and review  5.  Lactic acidosis-will follow after IV hydration.  6.  Acute on chronic renal insufficiency-I will dose the Lovenox for his current renal function.  7.  History of CAD  8. Leukocytosis  9.  Bradycardia and heart block-currently in sinus rhythm, will follow     ADDENDUM:  Second troponin 0.839, ECG with less anterolateral ST/T changes. Discussed with ICU staff- will continue current therapy at this point; patient is having absolutely no CP. Will ask renal to see- we will have to decide risk/benefit for subsequent cath, but will not take him to cath lab now- no pain and risk/benefit for acute cath unfavorable.        Carl Knowles III, MD  01/02/18  8:40 AM

## 2018-01-02 NOTE — CONSULTS
Referring Provider: Carl Knowles III, MD   Reason for Consultation: VENKATA    Subjective     Chief complaint   Chief Complaint   Patient presents with   • Dizziness   • Nausea       History of present illness:        88 Y/O WM with PMH of CAD S/P CABG, CHF , DMII and bladder CA S/P TURP as well as CKD III with BLN CR 1.5 mg/dl resented that to the emergency room status post fall after he fell dizzy and nauseated .  EMS initially came in and put him back in bed but later on the patient felt that he needed to go to the emergency department.  At time of arrival patient was very hypertensive and his creatinine was 2.3 mg/dL.  We were consulted to help in management of his acute kidney injury.  Patient was initially bolused in the emergency department and started on Levophed.  Flores catheter was exchanged.  Troponin has been rising since admission.  The echocardiogram showed decline of his ejection fraction to 30%.  Chest x-ray showed possible infiltrate in the left lung base and his UA showed many white blood cells.  The patient was started on IV antibiotic.  There is currently off pressors however he remains oliguric since admission.      Past Medical History:   Diagnosis Date   • Acute arthritis    • Acute renal insufficiency    • Anemia    • Angiodysplasia     of colon   • Atherosclerosis of native coronary artery of native heart without angina pectoris    • Atopic dermatitis    • BPH (benign prostatic hyperplasia)    • Cardiomyopathy 3/20/2017   • Carotid artery disease     HAD ABBEY ENDARTERECTOMY   • CKD (chronic kidney disease)    • Coronary artery disease    • Diabetes mellitus, type II     TYPE 2   • Edema    • Elbow pain    • Gout    • Hematuria     HISTORY REASON WHY SURGERY WAS CANCELED 2 WEEKS AGO   • Hyperlipidemia    • Hypertension     OFF MEDICATION FOR NOW.    • Joint effusion of elbow    • Lightheadedness    • Malignant neoplasm of bladder     ALSO CANCER ON LT LEG   • Near syncope    • Nephrolithiasis     • Neuralgia    • Psychogenic dysuria    • Pulmonary HTN 3/20/2017   • Renal insufficiency syndrome    • Skin avulsion    • Urinary retention    • UTI (urinary tract infection)      Past Surgical History:   Procedure Laterality Date   • CAROTID ENDARTERECTOMY Bilateral    • CATARACT EXTRACTION Bilateral    • COLONOSCOPY      Complete   • COLONOSCOPY N/A 2/15/2017    Procedure: COLONOSCOPY to cecum and TI with cold and hot snare polypectomies;  Surgeon: Jae Rosado MD;  Location: [facility] ENDOSCOPY;  Service:    • CORONARY ARTERY BYPASS GRAFT  1980'S & 2003    2 different surgeries BOTH SURGERY 3 VESSELS DONE   • ENDOSCOPY N/A 2/15/2017    Procedure: ESOPHAGOGASTRODUODENOSCOPY with biopsies;  Surgeon: Jae Rosdao MD;  Location: [facility] ENDOSCOPY;  Service:    • GALLBLADDER SURGERY     • [place] DRAIN LOWER LEG DEEP ABSC/HEMATOMA Left 7/14/2016    Procedure: LOWER EXTREMITY DEBRIDEMENT, EXCISION OF LT LEG SKIN TUMOR WITH FULL THICKNESS SKIN GRAFT RECONSTRUCTION;  Surgeon: Molina Jackson MD;  Location: [facility] OR;  Service: Plastics   • TRANSURETHRAL RESECTION OF BLADDER TUMOR N/A 4/10/2017    Procedure: CYSTOSCOPY WITH CLOT EVACUATION;  Surgeon: Vel Rogel MD;  Location: [facility] OR;  Service:      Family History   Problem Relation Age of Onset   • Heart failure Mother      congestive   • Lung cancer Father    • Diabetes Brother      mellitus     Social History   Substance Use Topics   • Smoking status: Former Smoker     Packs/day: 0.50     Years: 30.00     Types: Cigarettes, Pipe, Cigars   • Smokeless tobacco: Never Used      Comment: 25 YR AGO   • Alcohol use No      Comment: caffeine use     Prescriptions Prior to Admission   Medication Sig Dispense Refill Last Dose   • finasteride (PROSCAR) 5 MG tablet Take 1 tablet by mouth daily.   Taking   • furosemide (LASIX) 20 MG tablet TAKE 1 TABLET DAILY 90 tablet 0 Taking   • hydrALAZINE (APRESOLINE) 25 MG tablet Take 1 tablet by mouth 2 (Two)  "Times a Day. 180 tablet 3 Taking   • IRON PO Take 1 tablet by mouth 3 (Three) Times a Day.   Taking   • simvastatin (ZOCOR) 80 MG tablet TAKE 1 TABLET DAILY IN THE EVENING 90 tablet 2 Taking   • tamsulosin (FLOMAX) 0.4 MG capsule Take 1 capsule by mouth every night.   Taking   • traMADol (ULTRAM) 50 MG tablet 1/2 pill every 8 hours as needed for testicle pain 45 tablet 0      Allergies:  Iodine and Keflex [cephalexin]    Review of Systems  14 points  review of system were conducted and they were all negative except what is per history of present illness    Objective     Vital Signs  Temp:  [97.6 °F (36.4 °C)-100.6 °F (38.1 °C)] 97.6 °F (36.4 °C)  Heart Rate:  [49-94] 57  Resp:  [16] 16  BP: ()/(36-79) 106/44    Flowsheet Rows         First Filed Value    Admission Height  177.8 cm (70\") Documented at 01/02/2018 0247    Admission Weight  74.4 kg (164 lb) Documented at 01/02/2018 0247           I/O this shift:  In: 120 [P.O.:120]  Out: -   I/O last 3 completed shifts:  In: 3312 [IV Piggyback:3312]  Out: -     Intake/Output Summary (Last 24 hours) at 01/02/18 1513  Last data filed at 01/02/18 1334   Gross per 24 hour   Intake             3432 ml   Output                0 ml   Net             3432 ml       Physical Exam:     General Appearance:    Alert, cooperative, in no acute distress   Head:    Normocephalic, without obvious abnormality, atraumatic   Eyes:            Lids and lashes normal, conjunctivae and sclerae normal, no   icterus, no pallor, corneas clear, PERRLA   Ears:    Ears appear intact with no abnormalities noted   Throat:   No oral lesions, no thrush, oral mucosa moist   Neck:   No adenopathy, supple, trachea midline, no thyromegaly, no   carotid bruit, no JVD   Back:     No kyphosis present, no scoliosis present, no skin lesions,      erythema or scars, no tenderness to percussion or                   palpation,   range of motion normal   Lungs:    Coarse BS,respirations,respirations regular, " even and                  unlabored    Heart:    Regular rhythm and normal rate, normal S1 and S2, no            murmur, no gallop, no rub, no click   Chest Wall:    No abnormalities observed   Abdomen:     Normal bowel sounds, no masses, no organomegaly, soft        non-tender, non-distended, no guarding, no rebound                tenderness   Rectal:     Deferred   Extremities:   Moves all extremities well, no edema, no cyanosis, no             redness   Pulses:   Pulses palpable and equal bilaterally   Skin:   No bleeding, bruising or rash   Lymph nodes:   No palpable adenopathy   Neurologic:   Cranial nerves 2 - 12 grossly intact, sensation intact, DTR       present and equal bilaterally       Results Review:    Results from last 7 days  Lab Units 01/02/18  0354   SODIUM mmol/L 141   POTASSIUM mmol/L 4.7   CHLORIDE mmol/L 103   CO2 mmol/L 19.7*   BUN mg/dL 45*   CREATININE mg/dL 2.30*   CALCIUM mg/dL 9.1   BILIRUBIN mg/dL 0.5   ALK PHOS U/L 80   ALT (SGPT) U/L 61*   AST (SGOT) U/L 102*   GLUCOSE mg/dL 124*       Estimated Creatinine Clearance: 22.9 mL/min (by C-G formula based on Cr of 2.3).      Results from last 7 days  Lab Units 01/02/18  0354   MAGNESIUM mg/dL 2.2         Results from last 7 days  Lab Units 01/02/18  0354   WBC 10*3/mm3 14.49*   HEMOGLOBIN g/dL 10.0*   PLATELETS 10*3/mm3 181             Active Medications    aspirin 81 mg Oral Daily   enoxaparin 1 mg/kg Subcutaneous Q24H   piperacillin-tazobactam 3.375 g Intravenous Q8H   IV Fluids 1000 mL + additives 75 mL/hr Intravenous Once       norepinephrine 0.02-0.3 mcg/kg/min Last Rate: Stopped (01/02/18 1131)          Labs , imaging and prior records were reviewed  [place] with staff, patient and Dr. Burch and Dr Knowles      Assessment/Plan     1. Acute kidney injury on chronic kidney disease stage III: Most likely due to decreased renal perfusion due to decreased renal blood flow resulting from probably NSTEMI and possibly volume depletion.  Patient  is currently off of pressors.  He would benefit from gentle IV hydration.  UA showed many white blood cells, urine culture is pending.  The pyelonephritis is systems certainly a possibility we'll continue IV antibiotic for now.  Discussed with Dr Burch and Dr Knowles.  We'll obtain urine lites as well.  Will keep Flores in place.  We'll adjust all medications for creatinine clearance less than 30 mL/m/m²    2. CKD III with BLN  With BLN CR of 1.5 mg/dl  3.  Acute cardiac ischemia with a rise of his troponin: Currently on Lovenox.  Echocardiogram showed the ejection fraction declined to 30% from 36% earlier this year.  Cardiology holding on a cardiac catheter for now.  4.  Congestive heart failure with ejection fraction 30%  5.  Lactic acidosis  6.  Sepsis and hypotension    Mark Gu MD  01/02/18  3:13 PM

## 2018-01-02 NOTE — H&P
Patient Care Team:  Charles Rose MD as PCP - General (Family Medicine)  Martita Grimm RN as Care Coordinator (Population Health)    Chief complaint:  Fall    History of present illness:  This is an 89 -year-old male patient with history of CHF, bladder cancer with chronic Flores who presented to the ED because of falls.  He reported that he has not been feeling well over the last 2 days with lightheadedness, fatigue and decreased by mouth intake.     He was trying to get out of the bed around 11 PM when he fell down and could not go back to bed.  He did not lose consciousness or had any seizure activities.  He called EMS and they placed him back in bed.  He woke up again around 3 AM and fell out of the bed again.  Therefore EMS brought him to the ED.    He denies chest pain, shortness of breath or cough.  He has a chronic Flores catheter as stated above and was last changed 3 weeks ago.      In ED, patient was noted to have significant arrhythmia and multiple EKG were performed as shown below.  He had a low-grade temperature 100.6°F.  Reportedly, urine sediments and possibly was seen in the Flores bag and therefore Flores catheter was replaced.  By the time I inflated the patient, he did have very little dark brown urine in the Flores catheter.    Echo 2/17/17:  EF=36%; Grade II diastolic dysfunction; RVSP 78;     Labs reviewed:  Cr= 2.3 (baseline 1.5); ERK=028; Hb=9 (baseline 10); WBC=14.5; Trop= 0.3; Lactic acid=3.3     Review of Systems:  Constitutional: No fever or chills.   ENMT: No sinus congestion  Cardiovascular: No chest pain, palpitation or leg swelling  Respiratory: Denies dyspnea or cough.  He does not use oxygen at baseline however he has been requiring 4 L of oxygen since admission  Gastrointestinal: No constipation, diarrhea or abdominal pain   Neurology: No headache reported weakness and dizziness.  Musculoskeletal: No joints pain, stiffness or swelling.   Psychiatry: No  depression.  Lymphatic: No swollen glands.  Integumentary: Itching.     History  Past Medical History:   Diagnosis Date   • Acute arthritis    • Acute renal insufficiency    • Anemia    • Angiodysplasia     of colon   • Atherosclerosis of native coronary artery of native heart without angina pectoris    • Atopic dermatitis    • BPH (benign prostatic hyperplasia)    • Cardiomyopathy 3/20/2017   • Carotid artery disease     HAD ABBEY ENDARTERECTOMY   • CKD (chronic kidney disease)    • Coronary artery disease    • Diabetes mellitus, type II     TYPE 2   • Edema    • Elbow pain    • Gout    • Hematuria     HISTORY REASON WHY SURGERY WAS CANCELED 2 WEEKS AGO   • Hyperlipidemia    • Hypertension     OFF MEDICATION FOR NOW.    • Joint effusion of elbow    • Lightheadedness    • Malignant neoplasm of bladder     ALSO CANCER ON LT LEG   • Near syncope    • Nephrolithiasis    • Neuralgia    • Psychogenic dysuria    • Pulmonary HTN 3/20/2017   • Renal insufficiency syndrome    • Skin avulsion    • Urinary retention    • UTI (urinary tract infection)      Past Surgical History:   Procedure Laterality Date   • CAROTID ENDARTERECTOMY Bilateral    • CATARACT EXTRACTION Bilateral    • COLONOSCOPY      Complete   • COLONOSCOPY N/A 2/15/2017    Procedure: COLONOSCOPY to cecum and TI with cold and hot snare polypectomies;  Surgeon: Jae Rosado MD;  Location: [facility] ENDOSCOPY;  Service:    • CORONARY ARTERY BYPASS GRAFT  1980'S & 2003    2 different surgeries BOTH SURGERY 3 VESSELS DONE   • ENDOSCOPY N/A 2/15/2017    Procedure: ESOPHAGOGASTRODUODENOSCOPY with biopsies;  Surgeon: Jae Rosado MD;  Location: [facility] ENDOSCOPY;  Service:    • GALLBLADDER SURGERY     • [place] DRAIN LOWER LEG DEEP ABSC/HEMATOMA Left 7/14/2016    Procedure: LOWER EXTREMITY DEBRIDEMENT, EXCISION OF LT LEG SKIN TUMOR WITH FULL THICKNESS SKIN GRAFT RECONSTRUCTION;  Surgeon: Molina Jackson MD;  Location: [facility] OR;  Service: Plastics   •  TRANSURETHRAL RESECTION OF BLADDER TUMOR N/A 4/10/2017    Procedure: CYSTOSCOPY WITH CLOT EVACUATION;  Surgeon: Vel Rogel MD;  Location: [facility] OR;  Service:      Family History   Problem Relation Age of Onset   • Heart failure Mother      congestive   • Lung cancer Father    • Diabetes Brother      mellitus     Social History   Substance Use Topics   • Smoking status: Former Smoker     Packs/day: 0.50     Years: 30.00     Types: Cigarettes, Pipe, Cigars   • Smokeless tobacco: Never Used      Comment: 25 YR AGO   • Alcohol use No      Comment: caffeine use       (Not in a hospital admission)  Allergies:  Iodine and Keflex [cephalexin]    Vital Signs  Temp:  [100.6 °F (38.1 °C)] 100.6 °F (38.1 °C)  Heart Rate:  [49-94] 75  Resp:  [16] 16  BP: ()/(36-58) 97/58    Physical Exam:  Constitutional: Not in acute distress.  Eyes: Injected conjunctiva, EOMI.  ENMT: [place] 3. Dry tongue  Heart: RRR, no murmur  Lungs: Increased air entry at the bases.  No wheezing or crackles:          Abdomen: Obese. Soft. No tenderness or dullness.  Extremities: No cyanosis, clubbing but grade 2 pitting edema. Moves all extremities.  Neuro: Conscious, alert, oriented x3  Psych: Appropriate mood and affect.    Integumentary: No rash but scratch marks on his abdomen  Lymphatic: No palpable cervical or supraclavicular lymph nodes.            Diagnostic imaging:  I personally and independently reviewed the following images:     Bilateral pleural effusion, actually improved compared to prior testing      Bilateral pleural effusion      T-wave inversion in V2, V3         Complete AV block      Assessment and Plan:  1. NSTEMI  2. Shock, septic and cardiac  3. UTI, likely  4. 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 Systolic CHF with mild exacerbation  6. Bilateral pleural effusion  7. Acute hypoxic respiratory failure  8. Elevated transaminase, likely due to CHF/sepsis  9. Lactic acidosis  10. Chronic anemia    · Patient is  critically ill with likely and no infarct and septic/cardiogenic shock.    · Bedside ultrasound showed dilated RV with significantly reduced EF about 25%.  IVC was 2.1 cm with lack of significant respiratory collapse (<50%).  There is bilateral pleural effusion.  ·   · Antibiotics for UTI.  Doubt pneumonia.  But this would cover both.    · Pressors to keep map >65.  He has already received 2.3 L IV fluid    · Serial lactic acid    · Follow-up troponin.    · Echocardiogram    · Cardiology consulted.    Discussed code status with the patient and his wife.  They wish for full resuscitation but not prolonged life support.    I discussed the patients findings and my recommendations with patient, family, nursing staff and Dr. Crawley.     Leandro Coy MD  01/02/18  6:15 AM    Time: Critical care 38 min        EMR Dragon/Transcription disclaimer:   Much of this encounter note is an electronic transcription/translation of spoken language to printed text. The electronic translation of spoken language may permit erroneous, or at times, nonsensical words or phrases to be inadvertently transcribed; Although I have reviewed the note for such errors, some may still exist.

## 2018-01-02 NOTE — NURSING NOTE
Elevated troponin lab reported to Jovanna Baker and Rita, RNs for Dr Knowles.  She will forward this finding to Dr Knowles. //ADDENDUM: elevated troponin is expected finding per Dr Knowles this morning, no further testing required at this time after this noon troponin.  Patient is asymptomatic, denies chest pain or discomfort.  Will continue to monitor.

## 2018-01-02 NOTE — ED PROVIDER NOTES
EMERGENCY DEPARTMENT ENCOUNTER    CHIEF COMPLAINT  Chief Complaint: Dizziness  History given by: Pt  History limited by: Nothing  Room Number: 18/18  PMD: Charles Rose MD      HPI:  Pt is a 89 y.o. male who presents complaining of dizziness causing a fall tonight at 2200. Pt reports he fell out of bed last night at 2200, and was unable rise. He denies LOC or any trauma or injury. He reports calling the EMS, but decided not to come to the ED. Later in the night, he reports he called EMS again for dizziness and nausea. Pt denies neck pain, SOA, cough, CP, or abd pain. Pt reports a hx of afib, HTN    Duration:  6 hours  Onset: gradual  Timing: constant  Quality: dizziness  Intensity/Severity: moderate  Progression: unchanged  Associated Symptoms: nausea  Aggravating Factors: none  Alleviating Factors: none  Previous Episodes: Pt reports a hx of afib and HTN  Treatment before arrival: Pt called EMS two times PTA    PAST MEDICAL HISTORY  Active Ambulatory Problems     Diagnosis Date Noted   • Calculus of kidney 03/22/2016   • Hypertension 03/22/2016   • Hyperlipidemia 03/22/2016   • Gout 03/22/2016   • Type 2 diabetes mellitus with hyperglycemia 03/22/2016   • Atherosclerosis of coronary artery 03/22/2016   • Malignant neoplasm of urinary bladder 03/30/2016   • Hyperglycemia 04/03/2012   • Microscopic hematuria 02/13/2017   • Chronic kidney disease, stage 3 02/13/2017   • Iron deficiency anemia due to chronic blood loss 02/13/2017   • BPH (benign prostatic hyperplasia) 02/13/2017   • Colon polyps 02/16/2017   • Diverticulosis 02/16/2017   • Angiodysplasia of colon 02/16/2017   • Atrial fibrillation 02/16/2017   • 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   • Pulmonary HTN 03/20/2017   • Cardiomyopathy 03/20/2017   • Chronic coronary artery disease 04/20/2017   • Medicare annual wellness visit, initial 06/20/2017   • Testicle pain 12/19/2017     Resolved Ambulatory Problems     Diagnosis Date  Noted   • Effusion of elbow 03/22/2016   • Acute renal impairment 03/22/2016   • Near syncope 02/12/2017   • Acute posthemorrhagic anemia 02/13/2017   • Acute respiratory failure with hypoxia 02/28/2017   • Pleural effusion, bilateral 03/17/2017   • Gross hematuria 04/08/2017     Past Medical History:   Diagnosis Date   • Acute arthritis    • Acute renal insufficiency    • Anemia    • Angiodysplasia    • Atherosclerosis of native coronary artery of native heart without angina pectoris    • Atopic dermatitis    • BPH (benign prostatic hyperplasia)    • Cardiomyopathy 3/20/2017   • Carotid artery disease    • CKD (chronic kidney disease)    • Coronary artery disease    • Diabetes mellitus, type II    • Edema    • Elbow pain    • Gout    • Hematuria    • Hyperlipidemia    • Hypertension    • Joint effusion of elbow    • Lightheadedness    • Malignant neoplasm of bladder    • Near syncope    • Nephrolithiasis    • Neuralgia    • Psychogenic dysuria    • Pulmonary HTN 3/20/2017   • Renal insufficiency syndrome    • Skin avulsion    • Urinary retention    • UTI (urinary tract infection)        PAST SURGICAL HISTORY  Past Surgical History:   Procedure Laterality Date   • CAROTID ENDARTERECTOMY Bilateral    • CATARACT EXTRACTION Bilateral    • COLONOSCOPY      Complete   • COLONOSCOPY N/A 2/15/2017    Procedure: COLONOSCOPY to cecum and TI with cold and hot snare polypectomies;  Surgeon: Jae Rosado MD;  Location: [facility] ENDOSCOPY;  Service:    • CORONARY ARTERY BYPASS GRAFT  1980'S & 2003    2 different surgeries BOTH SURGERY 3 VESSELS DONE   • ENDOSCOPY N/A 2/15/2017    Procedure: ESOPHAGOGASTRODUODENOSCOPY with biopsies;  Surgeon: Jae Rosado MD;  Location: [facility] ENDOSCOPY;  Service:    • GALLBLADDER SURGERY     • KY DRAIN LOWER LEG DEEP ABSC/HEMATOMA Left 7/14/2016    Procedure: LOWER EXTREMITY DEBRIDEMENT, EXCISION OF LT LEG SKIN TUMOR WITH FULL THICKNESS SKIN GRAFT RECONSTRUCTION;  Surgeon: Molina Torres  MD Renetta;  Location: [facility] OR;  Service: Plastics   • TRANSURETHRAL RESECTION OF BLADDER TUMOR N/A 4/10/2017    Procedure: CYSTOSCOPY WITH CLOT EVACUATION;  Surgeon: Vel Rogel MD;  Location: [facility] OR;  Service:        FAMILY HISTORY  Family History   Problem Relation Age of Onset   • Heart failure Mother      congestive   • Lung cancer Father    • Diabetes Brother      mellitus       SOCIAL HISTORY  Social History     Social History   • Marital status:      Spouse name: N/A   • Number of children: 4   • Years of education: N/A     Occupational History   • retired      Social History Main Topics   • Smoking status: Former Smoker     Packs/day: 0.50     Years: 30.00     Types: Cigarettes, Pipe, Cigars   • Smokeless tobacco: Never Used      Comment: 25 YR AGO   • Alcohol use No      Comment: caffeine use   • Drug use: No   • Sexual activity: Defer     Other Topics Concern   • Not on file     Social History Narrative       ALLERGIES  Iodine and Keflex [cephalexin]    REVIEW OF SYSTEMS  Review of Systems   Constitutional: Negative.  Negative for chills and fever.   HENT: Negative.  Negative for sore throat.    Eyes: Negative.    Respiratory: Negative.  Negative for cough.    Cardiovascular: Negative.  Negative for chest pain.   Gastrointestinal: Positive for nausea.   Genitourinary: Negative.  Negative for dysuria.   Musculoskeletal: Negative.  Negative for back pain.   Skin: Negative.  Negative for rash.   Neurological: Positive for dizziness. Negative for headaches.       PHYSICAL EXAM  ED Triage Vitals   Temp Heart Rate Resp BP SpO2   01/02/18 0243 01/02/18 0243 01/02/18 0243 01/02/18 0243 01/02/18 0243   100.6 °F (38.1 °C) 94 16 106/44 95 %      Temp src Heart Rate Source Patient Position BP Location FiO2 (%)   01/02/18 0243 01/02/18 0243 01/02/18 0243 -- --   Tympanic Monitor Lying         Physical Exam   Constitutional: He is oriented to person, place, and time and well-developed,  well-nourished, and in no distress.   HENT:   Head: Normocephalic and atraumatic.   Mouth/Throat: Mucous membranes are dry.   Eyes: EOM are normal. Pupils are equal, round, and reactive to light.   Neck: Normal range of motion. Neck supple.   Cardiovascular: Normal rate, regular rhythm and normal heart sounds.    Pulmonary/Chest: Effort normal and breath sounds normal. No respiratory distress.   Abdominal: Soft. There is no tenderness. There is no rebound and no guarding.   Genitourinary:   Genitourinary Comments: Flores Catheter in place   Musculoskeletal: Normal range of motion. He exhibits no edema.   Neurological: He is alert and oriented to person, place, and time. He has normal sensation and normal strength.   Skin: Skin is warm and dry.   Chronic skin changes to BLE   Psychiatric: Mood and affect normal.   Nursing note and vitals reviewed.      LAB RESULTS  Lab Results (last 24 hours)     Procedure Component Value Units Date/Time    CBC & Differential [[id number]] Collected:  01/02/18 0354    Specimen:  Blood Updated:  01/02/18 0402    Narrative:       The following orders were created for panel order CBC & Differential.  Procedure                               Abnormality         Status                     ---------                               -----------         ------                     CBC Auto Differential[[id number]]        Abnormal            Final result                 Please view results for these tests on the individual orders.    Comprehensive Metabolic Panel [[id number]]  (Abnormal) Collected:  01/02/18 0354    Specimen:  Blood Updated:  01/02/18 0424     Glucose 124 (H) mg/dL      BUN 45 (H) mg/dL      Creatinine 2.30 (H) mg/dL      Sodium 141 mmol/L      Potassium 4.7 mmol/L      Chloride 103 mmol/L      CO2 19.7 (L) mmol/L      Calcium 9.1 mg/dL      Total Protein 7.1 g/dL      Albumin 3.20 (L) g/dL      ALT (SGPT) 61 (H) U/L      AST (SGOT) 102 (H) U/L      Alkaline Phosphatase 80 U/L       Total Bilirubin 0.5 mg/dL      eGFR Non African Amer 27 (L) mL/min/1.73      Globulin 3.9 gm/dL      A/G Ratio 0.8 g/dL      BUN/Creatinine Ratio 19.6     Anion Gap 18.3 mmol/L     Narrative:       The MDRD GFR formula is only valid for adults with stable renal function between ages 18 and 70.    Troponin [[id number]]  (Abnormal) Collected:  01/02/18 0354    Specimen:  Blood Updated:  01/02/18 0433     Troponin T 0.342 (C) ng/mL     Narrative:       Troponin T Reference Ranges:  Less than 0.03 ng/mL:    Negative for AMI  0.03 to 0.09 ng/mL:      Indeterminant for AMI  Greater than 0.09 ng/mL: Positive for AMI    Magnesium [[id number]]  (Normal) Collected:  01/02/18 0354    Specimen:  Blood Updated:  01/02/18 0424     Magnesium 2.2 mg/dL     CBC Auto Differential [[id number]]  (Abnormal) Collected:  01/02/18 0354    Specimen:  Blood Updated:  01/02/18 0402     WBC 14.49 (H) 10*3/mm3      RBC 3.84 (L) 10*6/mm3      Hemoglobin 10.0 (L) g/dL      Hematocrit 32.8 (L) %      MCV 85.4 fL      MCH 26.0 (L) pg      MCHC 30.5 (L) g/dL      RDW 16.5 (H) %      RDW-SD 51.6 fl      MPV 10.1 fL      Platelets 181 10*3/mm3      Neutrophil % 86.3 (H) %      Lymphocyte % 4.1 (L) %      Monocyte % 9.4 %      Eosinophil % 0.0 (L) %      Basophil % 0.1 %      Immature Grans % 0.1 %      Neutrophils, Absolute 12.50 (H) 10*3/mm3      Lymphocytes, Absolute 0.60 (L) 10*3/mm3      Monocytes, Absolute 1.36 (H) 10*3/mm3      Eosinophils, Absolute 0.00 10*3/mm3      Basophils, Absolute 0.01 10*3/mm3      Immature Grans, Absolute 0.02 10*3/mm3     Lactic Acid, Plasma [[id number]]  (Abnormal) Collected:  01/02/18 0409    Specimen:  Blood Updated:  01/02/18 0447     Lactate 3.3 (C) mmol/L     Lactic Acid, Reflex Timer [[id number]] Collected:  01/02/18 0409    Specimen:  Blood Updated:  01/02/18 0447    Blood Culture - Blood, [[id number]] Collected:  01/02/18 0411    Specimen:  Blood from Arm, Left Updated:  01/02/18 0418    Respiratory Panel, PCR -  Swab, Nasopharynx [[id number]] Collected:  01/02/18 0411    Specimen:  Swab from Nasopharynx Updated:  01/02/18 0416    Blood Culture - Blood, [[id number]] Collected:  01/02/18 0421    Specimen:  Blood from Arm, Right Updated:  01/02/18 0428          I ordered the above labs and reviewed the results    RADIOLOGY  XR Chest 1 View   Preliminary Result   Findings are concerning for congestive heart failure in the right   clinical setting. Small left, minimal right pleural effusion. Infiltrate   in the left lung base cannot be excluded.                    I ordered the above noted radiological studies. Interpreted by radiologist. Reviewed by me in PACS.       PROCEDURES  Critical Care  Performed by: NEWTON GREEN  Authorized by: NEWTON GREEN     Critical care provider statement:     Critical care time (minutes):  40    Critical care was necessary to treat or prevent imminent or life-threatening deterioration of the following conditions:  Cardiac failure, sepsis and renal failure    Critical care was time spent personally by me on the following activities:  Interpretation of cardiac output measurements, re-evaluation of patient's condition, review of old charts, pulse oximetry, ordering and review of radiographic studies, ordering and performing treatments and interventions, ordering and review of laboratory studies, development of treatment plan with patient or surrogate, discussions with consultants, evaluation of patient's response to treatment, examination of patient and obtaining history from patient or surrogate          EKG           EKG time: 0402  Rhythm/Rate: nsr, 85  P waves and TX: normal P waves, 1st degree AV block  QRS, axis: Poor R wave progression   ST and T waves: non-specific ST T wave changes    Limited by significant artifact     Interpreted Contemporaneously by me, independently viewed  unchanged compared to prior 2/16/17    EKG           EKG time: 0416  Rhythm/Rate: 50, junctional escape rhythm  P  waves and UT: high grade AV block  QRS, axis: RBB pattern   ST and T waves: Repolarization changes secondary to RBB pattern     Interpreted Contemporaneously by me, independently viewed  changed compared to prior done today at 0402    EKG           EKG time: 0433  Rhythm/Rate: afib, 63  P waves and UT: afib  QRS, axis: LAD   ST and T waves: ST changes in anterior leads suggestive of ischemia     Interpreted Contemporaneously by me, independently viewed  changed compared to prior 0416        PROGRESS AND CONSULTS  ED Course     0405 - Shortly after the pt's first EKG was performed, the pt appeared to enter into a new rhythm, so a second EKG was ordered.  Now looks like high grade AV block.      0408 - Lab work ordered for further evaluation.     0412 - EKG ordered.    0420 - Rechecked pt. Pt is resting comfortably.    0424 - Rechecked pt. HR 71, /55.    0431 - Consulted with Dr. Powell ([facility]) who agrees to see the pt in the hospital.     0443 - Rechecked pt. Pt denies any chest pain. HR 61, BP 92/46. Informed pt of his elevated troponin and a third EKG showing atrial fib with some ischemic looking ST segments in anterior leads.     0450 - Rechecked pt. HR 74, BP 95/58. Informed pt of his elevated creatinine and lactate levels. D/w pt the plans to put the pt on abx and admit him to the ICU for further work up.     0452 - IVF ordered.    0509 - Consult placed with pulmonology.     0550 - Consulted with Dr. Coy (pulmonology) who agrees to admit the pt.     MEDICAL DECISION MAKING  Results were reviewed/discussed with the patient and they were also made aware of online access. Pt also made aware that some labs, such as cultures, will not be resulted during ER visit and follow up with PMD is necessary.     MDM  Number of Diagnoses or Management Options  Acute renal failure superimposed on chronic kidney disease, unspecified CKD stage, unspecified acute renal failure type:   Acute UTI:   Elevated troponin:    Pneumonia of left lower lobe due to infectious organism:   Sepsis, due to unspecified organism:   Transient complete heart block:      Amount and/or Complexity of Data Reviewed  Clinical lab tests: ordered and reviewed (Lactate - 3.3  Troponin - 0.342  WBC - 14.49  BUN - 45  Creatinine - 2.30)  Tests in the radiology section of CPT®: ordered and reviewed (CXR - Possible CHF in the right. Small left, minimal right pleural effusion. Possible infiltrate in the left lung base.  )  Tests in the medicine section of CPT®: ordered and reviewed (See EKG note. )  Discuss the patient with other providers: yes (Dr. Powell ([facility])  Dr. Coy (pulmonology))    Critical Care  Total time providing critical care: 30-74 minutes         DIAGNOSIS  Final diagnoses:   Sepsis, due to unspecified organism   Pneumonia of left lower lobe due to infectious organism   Transient complete heart block   Acute UTI   Acute renal failure superimposed on chronic kidney disease, unspecified CKD stage, unspecified acute renal failure type   Elevated troponin       DISPOSITION  ADMISSION    Discussed treatment plan and reason for admission with pt/family and admitting physician.  Pt/family voiced understanding of the plan for admission for further testing/treatment as needed.         Latest Documented Vital Signs:  As of 5:55 AM  BP- 97/58 HR- 75 Temp- (!) 100.6 °F (38.1 °C) (Tympanic) O2 sat- 95%    --  Documentation assistance provided by carmen Hernadez for Dr. Mauro.  Information recorded by the scribe was done at my direction and has been verified and validated by me.     Lamonte Hernadez  01/02/18 9257       Pio Mauro MD  01/04/18 7646

## 2018-01-03 NOTE — PROGRESS NOTES
Discharge Planning Assessment  [facility]     Patient Name: Imtiaz Jones  MRN: [id number]  Today's Date: 1/3/2018    Admit Date: 1/2/2018          Discharge Needs Assessment       01/03/18 1740    Living Environment    Lives With spouse    Living Arrangements house    Transportation Available family or friend will provide    Living Environment    Provides Primary Care For no one    Quality Of Family Relationships supportive    Able to Return to Prior Living Arrangements yes    Discharge Needs Assessment    Concerns To Be Addressed discharge planning concerns    Concerns Comments would like HH at discharge    Readmission Within The Last 30 Days no previous admission in last 30 days    Anticipated Changes Related to Illness none    Equipment Currently Used at Home none    Equipment Needed After Discharge none    Discharge Facility/Level Of Care Needs home with home health    Current Discharge Risk dependent with mobility/activities of daily living    Discharge Disposition still a patient            Discharge Plan       01/03/18 1741    Case Management/Social Work Plan    Plan Home with [facility]    Patient/Family In Agreement With Plan yes    Additional Comments Spoke with pt, his wife and daughter at bedside.  Introduced self and explained role.  CCP contact information provided.  Face sheet and pharmacy verified and IMM received.  Pt is independent at home and uses no DME.  He states that he has used [facility] in the past and would like a referral again for dc needs.  Referral called and left .  Plan is home with .  CCP will follow for additional needs.        Discharge Placement     No information found                Demographic Summary       01/03/18 3465    Referral Information    Admission Type inpatient    Arrived From admitted as an inpatient;home or self-care    Contact Information    Comments permission granted to speak with pt's wife Natali and daughter Sarah at bedside     Primary Care Physician Information    Name Charles Rose MD            Functional Status       01/03/18 2597    Functional Status Prior    Ambulation 0-->independent    Transferring 0-->independent    Toileting 0-->independent    Bathing 0-->independent    Dressing 0-->independent    Eating 0-->independent    Communication 0-->understands/communicates without difficulty    Swallowing 0-->swallows foods/liquids without difficulty    IADL    Medications independent    Meal Preparation independent    Housekeeping independent    Laundry independent    Shopping independent    Oral Care independent            Psychosocial     None            Abuse/Neglect     None            Legal     None            Substance Abuse     None            Patient Forms     None          Crissy Massey RN

## 2018-01-03 NOTE — PLAN OF CARE
"Problem: Patient Care Overview (Adult)  Goal: Plan of Care Review  Outcome: Ongoing (interventions implemented as appropriate)   01/03/18 0601   Outcome Evaluation   Outcome Summary/Follow up Plan PT remained stable last night with no complaints of CP or generalized pain. HR 60-70 first degree block.  to 120 systolic. O2 weaned and pt now on room air. UOP increases to 300cc overnight (150cc on day shift). Pt reports \"positional pain\" with catheter that has been ongoing for \"a few weeks.\"      Goal: Adult Individualization and Mutuality  Outcome: Ongoing (interventions implemented as appropriate)    Goal: Discharge Needs Assessment  Outcome: Ongoing (interventions implemented as appropriate)      Problem: Fall Risk (Adult)  Goal: Identify Related Risk Factors and Signs and Symptoms  Outcome: Ongoing (interventions implemented as appropriate)    Goal: Absence of Falls  Outcome: Ongoing (interventions implemented as appropriate)      Problem: Sepsis (Adult)  Goal: Signs and Symptoms of Listed Potential Problems Will be Absent or Manageable (Sepsis)  Outcome: Ongoing (interventions implemented as appropriate)      Problem: Pneumonia (Adult)  Goal: Signs and Symptoms of Listed Potential Problems Will be Absent or Manageable (Pneumonia)  Outcome: Ongoing (interventions implemented as appropriate)      Problem: Pressure Ulcer Risk (Jamal Scale) (Adult,Obstetrics,Pediatric)  Goal: Identify Related Risk Factors and Signs and Symptoms  Outcome: Ongoing (interventions implemented as appropriate)    Goal: Skin Integrity  Outcome: Ongoing (interventions implemented as appropriate)        "

## 2018-01-03 NOTE — PROGRESS NOTES
"      [facility]         Dr Betancourt Sayied   LOS: 1 day   Patient Care Team:  Charles Rose MD as PCP - General (Family Medicine)  Martita Grimm, CODY as Care Coordinator (Population Health)    Chief Complaint: Shock in the setting off non-ST MI, UTI/pneumonia and AV block.    Interval History: Events noted chart reviewed.  Feels a little better.  Denies any chest pain.  No evidence of overt bleeding reported.    REVIEW OF SYSTEMS:   CARDIOVASCULAR: No chest pain, chest pressure or chest discomfort. No palpitations or edema.   RESPIRATORY: No shortness of breath, cough or sputum.   GASTROINTESTINAL: No anorexia, nausea, vomiting or diarrhea. No abdominal pain or blood.   HEMATOLOGIC: No bleeding or bruising.     Ventilator/Non-Invasive Ventilation Settings     None            Vital Signs  Temp:  [97.2 °F (36.2 °C)-97.7 °F (36.5 °C)] 97.7 °F (36.5 °C)  Heart Rate:  [] 56  BP: ()/(44-99) 119/48    Intake/Output Summary (Last 24 hours) at 01/03/18 1214  Last data filed at 01/03/18 0459   Gross per 24 hour   Intake             1812 ml   Output              450 ml   Net             1362 ml     Flowsheet Rows         First Filed Value    Admission Height  177.8 cm (70\") Documented at 01/02/2018 0247    Admission Weight  74.4 kg (164 lb) Documented at 01/02/2018 0247          Physical Exam:   General Appearance:    Alert, cooperative, in no acute distress   Lungs:     Diminished breath sounds rhonchi bilaterally on the bases mostly     Heart:    Regular rhythm and normal rate, normal S1 and S2, no            murmur, no gallop, no rub, no click   Chest Wall:    No abnormalities observed   Abdomen:     Normal bowel sounds, no masses, no organomegaly, soft        non-tender, non-distended, no guarding, no rebound                tenderness   Extremities:   Moves all extremities well, trace edema, no cyanosis, no             redness     Results Review:          Results from last 7 days  Lab Units " 01/03/18  0440 01/02/18  0354   SODIUM mmol/L 144 141   POTASSIUM mmol/L 4.4 4.7   CHLORIDE mmol/L 106 103   CO2 mmol/L 19.9* 19.7*   BUN mg/dL 55* 45*   CREATININE mg/dL 2.59* 2.30*   GLUCOSE mg/dL 91 124*   CALCIUM mg/dL 8.1* 9.1       Results from last 7 days  Lab Units 01/02/18  1248 01/02/18  0826 01/02/18  0354   TROPONIN T ng/mL 1.220* 0.839* 0.342*       Results from last 7 days  Lab Units 01/03/18  0440 01/02/18  0354   WBC 10*3/mm3 7.93 14.49*   HEMOGLOBIN g/dL 8.4* 10.0*   HEMATOCRIT % 27.9* 32.8*   PLATELETS 10*3/mm3 171 181               Results from last 7 days  Lab Units 01/02/18  0354   MAGNESIUM mg/dL 2.2               I reviewed the patient's new clinical results.  I personally viewed and interpreted the patient's CXR        Medication Review:     aspirin 81 mg Oral Daily   enoxaparin 1 mg/kg Subcutaneous Q24H   isosorbide mononitrate 30 mg Oral Q24H   piperacillin-tazobactam 3.375 g Intravenous Q8H            ASSESSMENT:   1. NSTEMI  2. Shock, septic and cardiac  3. UTI, likely  4. 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 Systolic CHF with mild exacerbation  6. Bilateral pleural effusion  7. Acute hypoxic respiratory failure  8. Elevated transaminase, likely due to CHF/sepsis  9. Lactic acidosis  10. Chronic anemia    PLAN:  Continue conservative supportive care by cardiology.  Troponin gradually increasing noted.  Discussed with Dr. Powell in his plans were noted.  Continue antibiotics for now.  De-escalate based on cultures  Patient remains hemodynamically much more stable  Renal managing fluid and diuretics  Lactic acidosis has resolved  Drop in hemoglobin noted.  Cardiology wants to transfuse if hemoglobin less than 8.  Transfer out of the CCU as per cardiology  Patient remains a full code      Asia Gibson MD  01/03/18  12:14 PM

## 2018-01-03 NOTE — PROGRESS NOTES
"Imtiaz Jones  12/28/1928 89 y.o.  [id number]      Patient Care Team:  Charles Rose MD as PCP - General (Family Medicine)  Matrita Grimm RN as Care Coordinator (Population Health)    CC: Presumed urosepsis, anterior STEMI, bypass twice in the past and severe ischemic cardiomyopathy with EF of 30%, severe renal insufficiency, borderline nutrition with him low albumin    Interval History: No chest discomfort no shortness of breath      Objective   Vital Signs  Temp:  [97.2 °F (36.2 °C)-97.7 °F (36.5 °C)] 97.7 °F (36.5 °C)  Heart Rate:  [] 56  BP: ()/(44-99) 119/48    Intake/Output Summary (Last 24 hours) at 01/03/18 1140  Last data filed at 01/03/18 0459   Gross per 24 hour   Intake             1812 ml   Output              450 ml   Net             1362 ml     Flowsheet Rows         First Filed Value    Admission Height  177.8 cm (70\") Documented at 01/02/2018 0247    Admission Weight  74.4 kg (164 lb) Documented at 01/02/2018 0247          Physical Exam:   General Appearance:    Alert,oriented, in no acute distress   Lungs:     Clear to auscultation,BS are equal    Heart:    Normal S1 and S2, RRR without murmur, gallop or rub   HEENT:    Sclera are clear, no JVD or adenopathy   Abdomen:     Normal bowel sounds, soft non-tender, non-distended, no HSM   Extremities:   Moves all extremities well, no edema, no cyanosis, no             Redness, no rash     Medication Review:        aspirin 81 mg Oral Daily   enoxaparin 1 mg/kg Subcutaneous Q24H   isosorbide mononitrate 30 mg Oral Q24H   piperacillin-tazobactam 3.375 g Intravenous Q8H            I reviewed the patient's new clinical results.  I personally viewed and interpreted the patient's EKG/Telemetry data    Assessment/Plan  Active Hospital Problems (** Indicates Principal Problem)    Diagnosis Date Noted   • **Sepsis [A41.9] 01/02/2018   • Cardiomyopathy [I42.9] 03/20/2017   • Chronic combined systolic and diastolic heart failure [I50.42] " 02/17/2017   • Atrial fibrillation [I48.91] 02/16/2017   • Chronic kidney disease, stage 3 [N18.3] 02/13/2017   • Hypertension [I10] 03/22/2016   • Atherosclerosis of coronary artery [I25.10] 03/22/2016   • Hyperlipidemia [E78.5] 03/22/2016   • Type 2 diabetes mellitus with hyperglycemia [E11.65] 03/22/2016      Resolved Hospital Problems    Diagnosis Date Noted Date Resolved   No resolved problems to display.       This is a very tough situation.  This is a gentleman with known coronary disease, now in with sepsis, severe renal insufficiency and probably had a STEMI anteriorly the other day.  Had a period of either AV dissociation or complete heart block.  That has resolved.  He is asymptomatic now.  With his age, his other co-morbidities, I feel the best route to go with him is going to be conservative care if we can.  I am going to add some Imdur to his regimen.  I think he can move out of the CCU.  I did speak to him about code status.  He would like to be a FULL CODE.        Shyam Powell MD  01/03/18  11:40 AM

## 2018-01-03 NOTE — DISCHARGE PLACEMENT REQUEST
"Bartolo Jones (89 y.o. Male)     Date of Birth Social Security Number Address Home Phone MRN    12/28/1928  [id number]   [facility] [id number] [phone] [id number]    Mormonism Marital Status          Rastafarian        Admission Date Admission Type Admitting Provider Attending Provider Department, Room/Bed    1/2/18 Emergency Leandro Coy MD Jambeih, Rami, MD [address], [address]    Discharge Date Discharge Disposition Discharge Destination                      Attending Provider: Leandro Coy MD     Allergies:  Iodine, Keflex [Cephalexin]    Isolation:  None   Infection:  None   Code Status:  FULL    Ht:  177.8 cm (70\")   Wt:  73.2 kg (161 lb 6 oz)    Admission Cmt:  None   Principal Problem:  Sepsis [A41.9]                 Active Insurance as of 1/2/2018     Primary Coverage     Payor Plan Insurance Group Employer/Plan Group    MEDICARE MEDICARE A & B      Payor Plan Address Payor Plan Phone Number Effective From Effective To    [address] [phone] 12/1/[address]     [place] [address]       Subscriber Name Subscriber Birth Date Member ID       BARTOLO JONES 12/28/1928 [id number]           Secondary Coverage     Payor Plan Insurance Group Employer/Plan Group     FOR LIFE  FOR LIFE  SUPP NGN     Payor Plan Address Payor Plan Phone Number Effective From Effective To    [address] [phone] 3/18/2016     [place] [address]       Subscriber Name Subscriber Birth Date Member ID       BARTOLO JONES 12/28/1928 [id number]                 Emergency Contacts      (Rel.) Home Phone Work Phone Mobile Phone    Natali Jones (Spouse) [phone] -- [phone]    Sarah Jones (Daughter) [phone] -- [phone]              "

## 2018-01-03 NOTE — PROGRESS NOTES
"   LOS: 1 day    Patient Care Team:  Charles Rose MD as PCP - General (Family Medicine)  Martita Grimm, CODY as Care Coordinator (Population Health)    Chief Complaint:    Chief Complaint   Patient presents with   • Dizziness   • Nausea       Subjective follow-up acute kidney injury on chronic kidney disease    Interval History:   The patient is feeling better since the admission, his urine output has increased slightly overnight in the first shift yesterday he had been treated 50 cc and overnight he increase his urine output 300 cc.  He denies any chest pain or shortness of air, no orthopnea or PND, no nausea or vomiting, no abdominal pain.  He has a chronic lower extremity edema.      Review of Systems:   As noted above    Objective     Vital Signs  Temp:  [97.2 °F (36.2 °C)-97.6 °F (36.4 °C)] 97.3 °F (36.3 °C)  Heart Rate:  [] 56  BP: ()/(44-99) 119/48    Flowsheet Rows         First Filed Value    Admission Height  177.8 cm (70\") Documented at 01/02/2018 0247    Admission Weight  74.4 kg (164 lb) Documented at 01/02/2018 0247             I/O last 3 completed shifts:  In: 5124 [P.O.:480; I.V.:1282; IV Piggyback:3362]  Out: 450 [Urine:450]    Intake/Output Summary (Last 24 hours) at 01/03/18 0742  Last data filed at 01/03/18 0459   Gross per 24 hour   Intake             1812 ml   Output              450 ml   Net             1362 ml       Physical Exam:  General Appearance: alert, oriented x 3, no acute distress, appears to be chronically ill  Skin: warm and dry, tonic stasis dermatitis both lower extremities  HEENT: Nonicteric sclerae, oral mucosa normal,   Neck: supple, no JVD, trachea midline  Lungs: Bilateral rhonchi, unlabored breathing effort  Heart: RRR, normal S1 and S2, no S3, no rub  Abdomen: soft, non-tender,  present bowel sounds to auscultation  : Flores catheter anchored in place,no palpable bladder,  Extremities: Chronic lower extremity edema with 1-2+ pedal edema, also evidenced " stasis dermatitis, no cyanosis or clubbing.  Neuro: normal speech and mental status      Results Review:      Results from last 7 days  Lab Units 01/03/18  0440 01/02/18  0354   SODIUM mmol/L 144 141   POTASSIUM mmol/L 4.4 4.7   CHLORIDE mmol/L 106 103   CO2 mmol/L 19.9* 19.7*   BUN mg/dL 55* 45*   CREATININE mg/dL 2.59* 2.30*   CALCIUM mg/dL 8.1* 9.1   BILIRUBIN mg/dL 0.4 0.5   ALK PHOS U/L 97 80   ALT (SGPT) U/L 278* 61*   AST (SGOT) U/L 347* 102*   GLUCOSE mg/dL 91 124*       Estimated Creatinine Clearance: 20 mL/min (by C-G formula based on Cr of 2.59).      Results from last 7 days  Lab Units 01/02/18  0354   MAGNESIUM mg/dL 2.2               Results from last 7 days  Lab Units 01/03/18  0440 01/02/18  0354   WBC 10*3/mm3 7.93 14.49*   HEMOGLOBIN g/dL 8.4* 10.0*   PLATELETS 10*3/mm3 171 181               Imaging Results (last 24 hours)     Procedure Component Value Units Date/Time    XR Chest 1 View [[id number]] Collected:  01/02/18 0400     Updated:  01/03/18 0228    Narrative:       X-RAY CHEST 1 VIEW.     HISTORY: Dizziness and wheezing.     COMPARISON: 02/17/2017.     FINDINGS:  Cardiomediastinal silhouette is within normal limits.         Pulmonary congestion with small left pleural effusion. Minimal right  pleural effusion.          Impression:       Findings are concerning for congestive heart failure in the right  clinical setting. Small left, minimal right pleural effusion. Infiltrate  in the left lung base cannot be excluded.      This report was finalized on 1/3/2018 2:25 AM by Dr. Hyacinth Britt MD.             aspirin 81 mg Oral Daily   enoxaparin 1 mg/kg Subcutaneous Q24H   piperacillin-tazobactam 3.375 g Intravenous Q8H       norepinephrine 0.02-0.3 mcg/kg/min Last Rate: Stopped (01/02/18 1131)       Medication Review:   Current Facility-Administered Medications   Medication Dose Route Frequency Provider Last Rate Last Dose   • aspirin EC tablet 81 mg  81 mg Oral Daily Carl Knowles III, MD   81  mg at 01/02/18 1122   • enoxaparin (LOVENOX) syringe 70 mg  1 mg/kg Subcutaneous Q24H Carl Knowles III, MD   70 mg at 01/02/18 1122   • norepinephrine (LEVOPHED) 8 mg/250 mL (32 mcg/mL) in sodium chloride 0.9% infusion (premix)  0.02-0.3 mcg/kg/min Intravenous Titrated Leandro Coy MD   Stopped at 01/02/18 1131   • piperacillin-tazobactam (ZOSYN) 3.375 g in iso-osmotic dextrose 50 ml (premix)  3.375 g Intravenous Q8H Asia Gibson MD   3.375 g at 01/03/18 0434   • pneumococcal polysaccharide 23-valent (PNEUMOVAX-23) vaccine 0.5 mL  0.5 mL Intramuscular During Hospitalization Leandro Coy MD       • sodium chloride 0.9 % flush 10 mL  10 mL Intravenous PRN Pio Mauro MD       • zolpidem (AMBIEN) tablet 5 mg  5 mg Oral Nightly PRN Leandro Coy MD   5 mg at 01/02/18 4852       Assessment/Plan   1.  Acute kidney injury associated with the hypotension leading to decreased renal perfusion and probable ATN associated with the sepsis and non-ST MI, the creatinine is up to 2.59 from 2.3 yesterday and his baseline is 1.5.  2.  Kidney stage III Baseline creatinine about 1.5.  3.  Acute to non-ST MI, ejection fraction about 30% with cardiomyopathy  4.  Sepsis treated.  5.  Hypotension associated with sepsis and then non-ST MI improved.  6.  Anemia, hemoglobin 8.4.  Baseline hemoglobin has been between 9 and 10 prior to admission, his iron saturation in February 2017 was very low.      Plan:  1.  We'll continue the same treatment.  2.  Check iron stores and immunofixation to be complete.  3.  Surveillance labs          Molina Paris MD  01/03/18  7:42 AM

## 2018-01-04 NOTE — PROGRESS NOTES
"[facility] PULMONARY CARE         Dr Betancourt Sayied   LOS: 2 days   Patient Care Team:  Charles Rose MD as PCP - General (Family Medicine)  Charles Rose MD as PCP - Claims Attributed  Martita Grimm RN as Care Coordinator (Population Health)    Chief Complaint: Shock in the setting off non-ST MI, UTI/pneumonia and AV block.    Interval History: Looks and feels much better today.  Denies any complaints today.  No chest pain or shortness of breath.    REVIEW OF SYSTEMS:   CARDIOVASCULAR: No chest pain, chest pressure or chest discomfort. No palpitations or edema.   RESPIRATORY: No shortness of breath, cough or sputum.   GASTROINTESTINAL: No anorexia, nausea, vomiting or diarrhea. No abdominal pain or blood.   HEMATOLOGIC: No bleeding or bruising.     Ventilator/Non-Invasive Ventilation Settings     None            Vital Signs  Temp:  [97.6 °F (36.4 °C)-98 °F (36.7 °C)] 97.9 °F (36.6 °C)  Heart Rate:  [64-78] 78  Resp:  [20] 20  BP: ()/(50-61) 87/50    Intake/Output Summary (Last 24 hours) at 01/04/18 1531  Last data filed at 01/04/18 1111   Gross per 24 hour   Intake              290 ml   Output              950 ml   Net             -660 ml     Flowsheet Rows         First Filed Value    Admission Height  177.8 cm (70\") Documented at 01/02/2018 0247    Admission Weight  74.4 kg (164 lb) Documented at 01/02/2018 0247          Physical Exam:   General Appearance:    Alert, cooperative, in no acute distress   Lungs:     Diminished breath sounds rhonchi bilaterally on the bases mostly     Heart:    Regular rhythm and normal rate, normal S1 and S2, no            murmur, no gallop, no rub, no click   Chest Wall:    No abnormalities observed   Abdomen:     Normal bowel sounds, no masses, no organomegaly, soft        non-tender, non-distended, no guarding, no rebound                tenderness   Extremities:   Moves all extremities well, trace edema, no cyanosis, no             redness     Results Review:  "         Results from last 7 days  Lab Units 01/04/18  0334 01/03/18  0440 01/02/18  0354   SODIUM mmol/L 138 144 141   POTASSIUM mmol/L 3.8 4.4 4.7   CHLORIDE mmol/L 102 106 103   CO2 mmol/L 20.2* 19.9* 19.7*   BUN mg/dL 60* 55* 45*   CREATININE mg/dL 2.57* 2.59* 2.30*   GLUCOSE mg/dL 108* 91 124*   CALCIUM mg/dL 8.0* 8.1* 9.1       Results from last 7 days  Lab Units 01/02/18  1248 01/02/18  0826 01/02/18  0354   TROPONIN T ng/mL 1.220* 0.839* 0.342*       Results from last 7 days  Lab Units 01/04/18  0334 01/03/18  0440 01/02/18  0354   WBC 10*3/mm3 8.88 7.93 14.49*   HEMOGLOBIN g/dL 8.8* 8.4* 10.0*   HEMATOCRIT % 29.2* 27.9* 32.8*   PLATELETS 10*3/mm3 203 171 181               Results from last 7 days  Lab Units 01/04/18  0334   MAGNESIUM mg/dL 2.3               I reviewed the patient's new clinical results.  I personally viewed and interpreted the patient's CXR        Medication Review:     aspirin 81 mg Oral Daily   enoxaparin 1 mg/kg Subcutaneous Q24H   iron sucrose 300 mg Intravenous Q24H   isosorbide mononitrate 30 mg Oral Q24H   piperacillin-tazobactam 3.375 g Intravenous Q12H            ASSESSMENT:   1. NSTEMI  2. Shock, septic and cardiac  3. UTI, likely  4. 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 Systolic CHF with mild exacerbation  6. Bilateral pleural effusion  7. Acute hypoxic respiratory failure  8. Elevated transaminase, likely due to CHF/sepsis  9. Lactic acidosis  10. Chronic anemia    PLAN:  Continue conservative supportive care by cardiology.  Troponin gradually increasing noted.   Switch to oral antibiotics  Renal managing fluid and diuretics  Lactic acidosis has resolved  Hemoglobin remained stable.  Patient remains a full code  Discussed with family    Asia Gibson MD  01/04/18  3:31 PM

## 2018-01-04 NOTE — PLAN OF CARE
Problem: Patient Care Overview (Adult)  Goal: Plan of Care Review  Outcome: Ongoing (interventions implemented as appropriate)   01/04/18 1602   Outcome Evaluation   Outcome Summary/Follow up Plan vss. patient rested well most of the day. venofer given with premedication. no complaints of pain. chung cath maintenance continued. will continue to monitor   Coping/Psychosocial Response Interventions   Plan Of Care Reviewed With patient   Patient Care Overview   Progress progress toward functional goals as expected       Problem: Fall Risk (Adult)  Goal: Identify Related Risk Factors and Signs and Symptoms  Outcome: Outcome(s) achieved Date Met: 01/04/18    Goal: Absence of Falls  Outcome: Ongoing (interventions implemented as appropriate)      Problem: Sepsis (Adult)  Goal: Signs and Symptoms of Listed Potential Problems Will be Absent or Manageable (Sepsis)  Outcome: Ongoing (interventions implemented as appropriate)      Problem: Pneumonia (Adult)  Goal: Signs and Symptoms of Listed Potential Problems Will be Absent or Manageable (Pneumonia)  Outcome: Ongoing (interventions implemented as appropriate)      Problem: Pressure Ulcer Risk (Jamal Scale) (Adult,Obstetrics,Pediatric)  Goal: Identify Related Risk Factors and Signs and Symptoms  Outcome: Ongoing (interventions implemented as appropriate)    Goal: Skin Integrity  Outcome: Ongoing (interventions implemented as appropriate)

## 2018-01-04 NOTE — PLAN OF CARE
Problem: Patient Care Overview (Adult)  Goal: Plan of Care Review  Outcome: Ongoing (interventions implemented as appropriate)   01/04/18 0618   Outcome Evaluation   Outcome Summary/Follow up Plan Patient resting well. Denies any pain. No chest pain the whole night . Not in any distress. Vitals stable. Flores cath insitu draining well. Fall precautions enforced. Monitored.   Coping/Psychosocial Response Interventions   Plan Of Care Reviewed With patient     Goal: Discharge Needs Assessment  Outcome: Ongoing (interventions implemented as appropriate)      Problem: Fall Risk (Adult)  Goal: Identify Related Risk Factors and Signs and Symptoms  Outcome: Ongoing (interventions implemented as appropriate)    Goal: Absence of Falls  Outcome: Ongoing (interventions implemented as appropriate)      Problem: Sepsis (Adult)  Goal: Signs and Symptoms of Listed Potential Problems Will be Absent or Manageable (Sepsis)  Outcome: Ongoing (interventions implemented as appropriate)      Problem: Pneumonia (Adult)  Goal: Signs and Symptoms of Listed Potential Problems Will be Absent or Manageable (Pneumonia)  Outcome: Ongoing (interventions implemented as appropriate)      Problem: Pressure Ulcer Risk (Jamal Scale) (Adult,Obstetrics,Pediatric)  Goal: Identify Related Risk Factors and Signs and Symptoms  Outcome: Ongoing (interventions implemented as appropriate)    Goal: Skin Integrity  Outcome: Ongoing (interventions implemented as appropriate)

## 2018-01-04 NOTE — PROGRESS NOTES
"Adult Nutrition  Assessment/PES    Patient Name:  Imtiaz Jones  YOB: 1928  MRN: [id number]  Admit Date:  1/2/2018    Assessment Date:  1/4/2018    Comments:  Assessment triggered by MST score of 2 per nurse admission screen.  50% x 1 meal per chart PO data.  Patient reports fair appetite, he thinks he is eating enough and does not want any kind of oral nutrition supplement at this time.  Will continue to follow for intake.          Reason for Assessment       01/04/18 1453    Reason for Assessment    Reason For Assessment/Visit identified at risk by screening criteria    Identified At Risk By Screening Criteria MST SCORE 2+    Diagnosis Diagnosis    Cardiac HTN;PAF;Cardiomyopathy;CAD    Endocrine DM Type 2    Infectious Disease Sepsis    Oncology Bladder cancer    Ortho Osteoarthritis    Pulmonary/Critical Care Pulmonary hypertension    Renal CKD              Nutrition/Diet History       01/04/18 1454    Nutrition/Diet History    Typical Food/Fluid Intake reports fair appetite, says it has been like this since 2-3 days before admit, thinks he is eating enough, says at least 50% at meals, does not want anything at this time            Anthropometrics       01/04/18 1454    Anthropometrics    Height 177.8 cm (70\")   per patient    Weight 86.8 kg (191 lb 5.8 oz)    RD Documented Current Weight  75.8 kg (167 lb)   per patient this is current weight and UBW    Ideal Body Weight (IBW)    Ideal Body Weight (IBW), Male (kg) 76.48    % Ideal Body Weight 113.73    Usual Body Weight (UBW)    Usual Body Weight 75.8 kg (167 lb)    % Usual Body Weight 114.59    Body Mass Index (BMI)    BMI (kg/m2) 27.51    BMI Grade 25 - 29.9 - overweight            Labs/Tests/Procedures/Meds       01/04/18 1455    Labs/Tests/Procedures/Meds    Diagnostic Test/Procedure Review reviewed, pertinent    Labs/Tests Review Reviewed;Glucose;CO2;Creat;BUN;GFR;ALT;AST;Alb;Hgb Hct    Medication Review Reviewed, pertinent;Antibiotic    " Significant Vitals reviewed, pertinent            Physical Findings       01/04/18 1456    Physical Findings/Assessment    Additional Documentation Physical Appearance (Group)    Physical Appearance    Skin other (see comments)   skin tears, abrasion, B=17              Nutrition Prescription Ordered       01/04/18 1456    Nutrition Prescription PO    Current PO Diet Regular    Fluid Consistency Thin    Common Modifiers Cardiac;Consistent Carbohydrate            Evaluation of Received Nutrient/Fluid Intake       01/04/18 1500    PO Evaluation    Number of Meals 1    % PO Intake 50            Problem/Interventions:        Problem 1       01/04/18 1500    Nutrition Diagnoses Problem 1    Problem 1 Nutrition Appropriate for Condition at this Time    Etiology (related to) Factors Affecting Nutrition    Appetite Fair    Signs/Symptoms (evidenced by) Report/Observation    Reported/Observed By Patient                    Intervention Goal       01/04/18 1501    Intervention Goal    General Maintain nutrition    PO Increase intake;PO intake (%)    PO Intake % 75 %    Weight No significant weight loss            Nutrition Intervention       01/04/18 1501    Nutrition Intervention    RD/Tech Action Follow Tx progress;Care plan reviewd;Encourage intake;Supplement offered/refused              Education/Evaluation       01/04/18 1502    Education    Education Will Instruct as appropriate    Monitor/Evaluation    Monitor Per protocol;PO intake;Weight        Electronically signed by:  Honey Manning RD  01/04/18 3:02 PM

## 2018-01-05 NOTE — PROGRESS NOTES
"Imtiaz Jones  12/28/1928 89 y.o.  [id number]      Patient Care Team:  Charles Rose MD as PCP - General (Family Medicine)  Charles Rose MD as PCP - Claims Attributed  Martita Grimm RN as Care Coordinator (Population Health)    CC: Presumed urosepsis, anterior STEMI, bypass twice in the past and severe ischemic cardiomyopathy with EF of 30%, severe renal insufficiency, borderline nutrition with him low albumin    Interval History: No chest discomfort, no shortness of breath, feeling stronger      Objective   Vital Signs  Temp:  [97.4 °F (36.3 °C)-98.1 °F (36.7 °C)] 98.1 °F (36.7 °C)  Heart Rate:  [63-83] 78  Resp:  [18-20] 20  BP: ()/(50-68) 114/67    Intake/Output Summary (Last 24 hours) at 01/05/18 1001  Last data filed at 01/05/18 0850   Gross per 24 hour   Intake              600 ml   Output             1300 ml   Net             -700 ml     Flowsheet Rows         First Filed Value    Admission Height  177.8 cm (70\") Documented at 01/02/2018 0247    Admission Weight  74.4 kg (164 lb) Documented at 01/02/2018 0247          Physical Exam:   General Appearance:    Alert,oriented, in no acute distress   Lungs:     Clear to auscultation,BS are equal    Heart:    Normal S1 and S2, RRR without murmur, gallop or rub   HEENT:    Sclera are clear, no JVD or adenopathy   Abdomen:     Normal bowel sounds, soft non-tender, non-distended, no HSM   Extremities:   Moves all extremities well, no edema, no cyanosis, no             Redness, no rash     Medication Review:        amoxicillin-clavulanate 1 tablet Oral Q12H   aspirin 81 mg Oral Daily   enoxaparin 1 mg/kg Subcutaneous Q24H   iron sucrose 300 mg Intravenous Q24H   isosorbide mononitrate 30 mg Oral Q24H            I reviewed the patient's new clinical results.  I personally viewed and interpreted the patient's EKG/Telemetry data    Assessment/Plan  Active Hospital Problems (** Indicates Principal Problem)    Diagnosis Date Noted   • **Sepsis [A41.9] " 01/02/2018   • Cardiomyopathy [I42.9] 03/20/2017   • Chronic combined systolic and diastolic heart failure [I50.42] 02/17/2017   • Atrial fibrillation [I48.91] 02/16/2017   • Chronic kidney disease, stage 3 [N18.3] 02/13/2017   • Hypertension [I10] 03/22/2016   • Atherosclerosis of coronary artery [I25.10] 03/22/2016   • Hyperlipidemia [E78.5] 03/22/2016   • Type 2 diabetes mellitus with hyperglycemia [E11.65] 03/22/2016      Resolved Hospital Problems    Diagnosis Date Noted Date Resolved   No resolved problems to display.     1.  Sepsis  2.  Severe renal insufficiency  3.  CAD  4.  STEMI, anterior, on admission-as noted by Dr. Powell patient is an extremely high risk for cardiac catheterization.  Patient has been asymptomatic since presentation and his ejection fraction is unchanged from prior.  The recommendation from Dr. Powell has been to pursue a conservative course until we see further signs of cardiac instability.  5.  CAD  6.  Patient had episode of AV dissociation/complete heart block on arrival in the setting of his STEMI-sinus rhythm since  7.  Hypertensive heart disease without heart failure  8.  Diabetes mellitus with circulatory complication  9.  Discussion regarding CODE STATUS was held; patient wishes to remain a full code          Carl Knowles III, MD  01/05/18  10:01 AM

## 2018-01-05 NOTE — PROGRESS NOTES
"[facility] PULMONARY CARE         Dr Betancourt Sayied   LOS: 3 days   Patient Care Team:  Charles Rose MD as PCP - General (Family Medicine)  Charles Rose MD as PCP - Claims Attributed  Martita Grimm RN as Care Coordinator (Population Health)    Chief Complaint: Shock in the setting off non-ST MI, UTI/pneumonia and AV block.    Interval History: Awake alert.  Still quite weak and not ambulating much.    REVIEW OF SYSTEMS:   CARDIOVASCULAR: No chest pain, chest pressure or chest discomfort. No palpitations or edema.   RESPIRATORY: No shortness of breath, cough or sputum.   GASTROINTESTINAL: No anorexia, nausea, vomiting or diarrhea. No abdominal pain or blood.   HEMATOLOGIC: No bleeding or bruising.     Ventilator/Non-Invasive Ventilation Settings     None            Vital Signs  Temp:  [97.5 °F (36.4 °C)-98.4 °F (36.9 °C)] 98.4 °F (36.9 °C)  Heart Rate:  [63-83] 73  Resp:  [18-20] 18  BP: (104-129)/(57-68) 115/63    Intake/Output Summary (Last 24 hours) at 01/05/18 1857  Last data filed at 01/05/18 1607   Gross per 24 hour   Intake              840 ml   Output             1450 ml   Net             -610 ml     Flowsheet Rows         First Filed Value    Admission Height  177.8 cm (70\") Documented at 01/02/2018 0247    Admission Weight  74.4 kg (164 lb) Documented at 01/02/2018 0247          Physical Exam:   General Appearance:    Alert, cooperative, in no acute distress   Lungs:     Diminished breath sounds rhonchi bilaterally on the bases mostly     Heart:    Regular rhythm and normal rate, normal S1 and S2, no            murmur, no gallop, no rub, no click   Chest Wall:    No abnormalities observed   Abdomen:     Normal bowel sounds, no masses, no organomegaly, soft        non-tender, non-distended, no guarding, no rebound                tenderness   Extremities:   Moves all extremities well, trace edema, no cyanosis, no             redness     Results Review:          Results from last 7 days  Lab Units " 01/05/18  0611 01/04/18  0334 01/03/18  0440   SODIUM mmol/L 140 138 144   POTASSIUM mmol/L 3.8 3.8 4.4   CHLORIDE mmol/L 106 102 106   CO2 mmol/L 24.5 20.2* 19.9*   BUN mg/dL 59* 60* 55*   CREATININE mg/dL 2.21* 2.57* 2.59*   GLUCOSE mg/dL 105* 108* 91   CALCIUM mg/dL 8.1* 8.0* 8.1*       Results from last 7 days  Lab Units 01/02/18  1248 01/02/18  0826 01/02/18  0354   TROPONIN T ng/mL 1.220* 0.839* 0.342*       Results from last 7 days  Lab Units 01/05/18  0611 01/04/18  0334 01/03/18  0440   WBC 10*3/mm3 9.31 8.88 7.93   HEMOGLOBIN g/dL 8.3* 8.8* 8.4*   HEMATOCRIT % 27.0* 29.2* 27.9*   PLATELETS 10*3/mm3 227 203 171               Results from last 7 days  Lab Units 01/05/18  0611   MAGNESIUM mg/dL 2.3               I reviewed the patient's new clinical results.  I personally viewed and interpreted the patient's CXR        Medication Review:     amoxicillin-clavulanate 1 tablet Oral Q12H   aspirin 81 mg Oral Daily   enoxaparin 1 mg/kg Subcutaneous Q24H   iron sucrose 300 mg Intravenous Q24H   isosorbide mononitrate 30 mg Oral Q24H   torsemide 40 mg Oral Daily            ASSESSMENT:   1. NSTEMI  2. Shock, septic and cardiac  3. UTI, likely  4. 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 Systolic CHF with mild exacerbation  6. Bilateral pleural effusion  7. Acute hypoxic respiratory failure  8. Elevated transaminase, likely due to CHF/sepsis  9. Lactic acidosis  10. Chronic anemia    PLAN:  Continue conservative supportive care by cardiology.   Switch to oral antibiotics  Renal managing fluid and diuretics  Lactic acidosis has resolved  Hemoglobin remained stable.  Patient remains a full code  Initiate physical therapy  Discharge once okay with all consultants    Asia Gibson MD  01/05/18  6:57 PM

## 2018-01-05 NOTE — PROGRESS NOTES
"   LOS: 3 days    Patient Care Team:  Charles Rose MD as PCP - General (Family Medicine)  Charles Rose MD as PCP - Claims Attributed  Martita Grimm RN as Care Coordinator (Population Health)    Chief Complaint:    Chief Complaint   Patient presents with   • Dizziness   • Nausea       Subjective follow-up acute kidney injury on chronic kidney disease    Interval History:   Feeling much better today, no chest pain or shortness of air, no orthopnea or PND, no nausea or vomiting, no abdominal pain.  He has Flores catheter anchored in place.  He has chronic changes of his lower extremities with chronic edema.      Review of Systems:   As noted above    Objective     Vital Signs  Temp:  [97.4 °F (36.3 °C)-98.1 °F (36.7 °C)] 98.1 °F (36.7 °C)  Heart Rate:  [63-83] 78  Resp:  [18-20] 20  BP: ()/(50-68) 114/67    Flowsheet Rows         First Filed Value    Admission Height  177.8 cm (70\") Documented at 01/02/2018 0247    Admission Weight  74.4 kg (164 lb) Documented at 01/02/2018 0247          I/O this shift:  In: 360 [P.O.:360]  Out: -   I/O last 3 completed shifts:  In: 530 [P.O.:480; IV Piggyback:50]  Out: 2000 [Urine:2000]    Intake/Output Summary (Last 24 hours) at 01/05/18 1040  Last data filed at 01/05/18 0850   Gross per 24 hour   Intake              600 ml   Output             1300 ml   Net             -700 ml       Physical Exam:  General Appearance: alert, oriented x 3, no acute distress, appears to be chronically ill  Skin: warm and dry, Chronic stasis dermatitis both lower extremities  HEENT: Nonicteric sclerae, oral mucosa normal,   Neck: supple, no JVD, trachea midline  Lungs: Bilateral rhonchi, unlabored breathing effort  Heart: RRR, normal S1 and S2, no S3, no rub  Abdomen: soft, non-tender,  present bowel sounds to auscultation  : Flores catheter anchored in place,no palpable bladder,  Extremities: Chronic lower extremity edema with 1+ pedal edema, And 1+ hip, also evidenced stasis dermatitis, " no cyanosis or clubbing.  Neuro: normal speech and mental status      Results Review:      Results from last 7 days  Lab Units 01/05/18  0611 01/04/18  0334 01/03/18  0440   SODIUM mmol/L 140 138 144   POTASSIUM mmol/L 3.8 3.8 4.4   CHLORIDE mmol/L 106 102 106   CO2 mmol/L 24.5 20.2* 19.9*   BUN mg/dL 59* 60* 55*   CREATININE mg/dL 2.21* 2.57* 2.59*   CALCIUM mg/dL 8.1* 8.0* 8.1*   BILIRUBIN mg/dL 0.4 0.4 0.4   ALK PHOS U/L 84 89 97   ALT (SGPT) U/L 286* 262* 278*   AST (SGOT) U/L 208* 212* 347*   GLUCOSE mg/dL 105* 108* 91       Estimated Creatinine Clearance: 27.5 mL/min (by C-G formula based on Cr of 2.21).      Results from last 7 days  Lab Units 01/05/18  0611 01/04/18  0334 01/02/18  0354   MAGNESIUM mg/dL 2.3 2.3 2.2   PHOSPHORUS mg/dL 3.3 3.8  --          Results from last 7 days  Lab Units 01/05/18  0611 01/04/18  0334   URIC ACID mg/dL 11.7* 11.7*         Results from last 7 days  Lab Units 01/05/18  0611 01/04/18  0334 01/03/18  0440 01/02/18  0354   WBC 10*3/mm3 9.31 8.88 7.93 14.49*   HEMOGLOBIN g/dL 8.3* 8.8* 8.4* 10.0*   PLATELETS 10*3/mm3 227 203 171 181               Imaging Results (last 24 hours)     ** No results found for the last 24 hours. **          amoxicillin-clavulanate 1 tablet Oral Q12H   aspirin 81 mg Oral Daily   enoxaparin 1 mg/kg Subcutaneous Q24H   iron sucrose 300 mg Intravenous Q24H   isosorbide mononitrate 30 mg Oral Q24H          Medication Review:   Current Facility-Administered Medications   Medication Dose Route Frequency Provider Last Rate Last Dose   • amoxicillin-clavulanate (AUGMENTIN) 500-125 MG per tablet 500 mg  1 tablet Oral Q12H Asia Gibson MD   500 mg at 01/05/18 0953   • aspirin EC tablet 81 mg  81 mg Oral Daily Carl Knowles III, MD   81 mg at 01/05/18 0953   • enoxaparin (LOVENOX) syringe 70 mg  1 mg/kg Subcutaneous Q24H Carl Knowles III, MD   70 mg at 01/04/18 0838   • iron sucrose (VENOFER) 300 mg in sodium chloride 0.9 % 250 mL IVPB  300 mg Intravenous  Q24H Molina Paris MD   300 mg at 01/05/18 0953   • isosorbide mononitrate (IMDUR) 24 hr tablet 30 mg  30 mg Oral Q24H Shyam Powell MD   30 mg at 01/05/18 0953   • pneumococcal polysaccharide 23-valent (PNEUMOVAX-23) vaccine 0.5 mL  0.5 mL Intramuscular During Hospitalization Leandro Coy MD       • sodium chloride 0.9 % flush 10 mL  10 mL Intravenous PRN Pio Mauro MD       • zolpidem (AMBIEN) tablet 5 mg  5 mg Oral Nightly PRN Leandro Coy MD   5 mg at 01/04/18 2032       Assessment/Plan   1.  Acute kidney injury associated with the hypotension leading to decreased renal perfusion and probable ATN associated with the sepsis and non-ST MI, the creatinine is 2.21, which is improving, and his baseline is 1.5.  2.  Stage III chronic kidney disease, Baseline creatinine about 1.5.  3.  Acute to non-ST MI, ejection fraction about 30% with cardiomyopathy  4.  Sepsis treated.  5.  Hypotension associated with sepsis and then non-ST MI improved.  6.  Anemia, hemoglobin 8.3.  Baseline hemoglobin has been between 9 and 10, has iron deficiency and the iron is being repleted.  prior to admission,  7.  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      Plan:  1.  We'll start oral diuretics today.  2.  Continue the same treatment otherwise  3.  Surveillance labs          Molina Paris MD  01/05/18  10:40 AM

## 2018-01-05 NOTE — PLAN OF CARE
Problem: Patient Care Overview (Adult)  Goal: Plan of Care Review  Outcome: Ongoing (interventions implemented as appropriate)   01/05/18 0000   Outcome Evaluation   Outcome Summary/Follow up Plan no c/o pain and n/v or soa. no s/s of acute distress. VSS will continue to monitor   Coping/Psychosocial Response Interventions   Plan Of Care Reviewed With patient   Patient Care Overview   Progress progress toward functional goals as expected     Goal: Adult Individualization and Mutuality  Outcome: Ongoing (interventions implemented as appropriate)    Goal: Discharge Needs Assessment  Outcome: Ongoing (interventions implemented as appropriate)      Problem: Fall Risk (Adult)  Goal: Absence of Falls  Outcome: Ongoing (interventions implemented as appropriate)      Problem: Sepsis (Adult)  Goal: Signs and Symptoms of Listed Potential Problems Will be Absent or Manageable (Sepsis)  Outcome: Outcome(s) achieved Date Met: 01/05/18      Problem: Pressure Ulcer Risk (Jamal Scale) (Adult,Obstetrics,Pediatric)  Goal: Identify Related Risk Factors and Signs and Symptoms  Outcome: Outcome(s) achieved Date Met: 01/05/18    Goal: Skin Integrity  Outcome: Ongoing (interventions implemented as appropriate)

## 2018-01-05 NOTE — PLAN OF CARE
Problem: Patient Care Overview (Adult)  Goal: Plan of Care Review  Outcome: Ongoing (interventions implemented as appropriate)   01/05/18 0600   Outcome Evaluation   Outcome Summary/Follow up Plan NO s/s of distress noted. No c/o pain. Rested well tonight.     Goal: Adult Individualization and Mutuality  Outcome: Ongoing (interventions implemented as appropriate)    Goal: Discharge Needs Assessment  Outcome: Ongoing (interventions implemented as appropriate)      Problem: Fall Risk (Adult)  Goal: Absence of Falls  Outcome: Ongoing (interventions implemented as appropriate)      Problem: Sepsis (Adult)  Goal: Signs and Symptoms of Listed Potential Problems Will be Absent or Manageable (Sepsis)  Outcome: Ongoing (interventions implemented as appropriate)      Problem: Pneumonia (Adult)  Goal: Signs and Symptoms of Listed Potential Problems Will be Absent or Manageable (Pneumonia)  Outcome: Outcome(s) achieved Date Met: 01/05/18      Problem: Pressure Ulcer Risk (Jamal Scale) (Adult,Obstetrics,Pediatric)  Goal: Identify Related Risk Factors and Signs and Symptoms  Outcome: Ongoing (interventions implemented as appropriate)    Goal: Skin Integrity  Outcome: Ongoing (interventions implemented as appropriate)

## 2018-01-06 NOTE — PLAN OF CARE
Problem: Patient Care Overview (Adult)  Goal: Plan of Care Review  Outcome: Ongoing (interventions implemented as appropriate)   01/06/18 1041   Outcome Evaluation   Outcome Summary/Follow up Plan Pt presents with generalized weakness and decreased activity tolerance. He was able to demonstrate bed mobility with CGAx1, transfers with MinAx1, and ambulated 120 ft with RWx and SBA-CGAx1. Pt will benefit from continuing skilled PT to address impairments and to improve independence with functional mobility. PT recommends d/c home with assistance when appropriate, pending progress.    Coping/Psychosocial Response Interventions   Plan Of Care Reviewed With patient       Problem: Inpatient Physical Therapy  Goal: Bed Mobility Goal LTG- PT  Outcome: Ongoing (interventions implemented as appropriate)   01/06/18 1041   Bed Mobility PT LTG   Bed Mobility PT LTG, Date Established 01/06/18   Bed Mobility PT LTG, Time to Achieve 1 wk   Bed Mobility PT LTG, Activity Type all bed mobility   Bed Mobility PT LTG, [place] Level independent     Goal: Transfer Training Goal 1 LTG- PT  Outcome: Ongoing (interventions implemented as appropriate)   01/06/18 1041   Transfer Training PT LTG   Transfer Training PT LTG, Date Established 01/06/18   Transfer Training PT LTG, Time to Achieve 1 wk   Transfer Training PT LTG, Activity Type all transfers   Transfer Training PT LTG, [place] independent;conditional independence   Transfer Training PT LTG, Assist Device walker, rolling     Goal: Gait Training Goal LTG- PT  Outcome: Ongoing (interventions implemented as appropriate)   01/06/18 1041   Gait Training PT LTG   Gait Training Goal PT LTG, Date Established 01/06/18   Gait Training Goal PT LTG, Time to Achieve 1 wk   Gait Training Goal PT LTG, [place] independent;conditional independence   Gait Training Goal PT LTG, Assist Device walker, rolling   Gait Training Goal PT LTG, Distance to Achieve 350

## 2018-01-06 NOTE — THERAPY EVALUATION
Acute Care - Physical Therapy Initial Evaluation  [facility]     Patient Name: Imtiaz Jones  : 1928  MRN: [id number]  Today's Date: 2018                Admit Date: 2018     Visit Dx:    ICD-10-CM ICD-9-CM   1. Sepsis, due to unspecified organism A41.9 038.9     995.91   2. Pneumonia of left lower lobe due to infectious organism J18.1 486   3. Transient complete heart block I44.2 426.0   4. Acute UTI N39.0 599.0   5. Acute renal failure superimposed on chronic kidney disease, unspecified CKD stage, unspecified acute renal failure type N17.9 584.9    N18.9 585.9   6. Elevated troponin R74.8 790.6   7. Impaired functional mobility, balance, gait, and endurance Z74.09 V49.89     Patient Active Problem List   Diagnosis   • Calculus of kidney   • Hypertension   • Hyperlipidemia   • Gout   • Type 2 diabetes mellitus with hyperglycemia   • Atherosclerosis of coronary artery   • Malignant neoplasm of urinary bladder   • Hyperglycemia   • Microscopic hematuria   • Chronic kidney disease, stage 3   • Iron deficiency anemia due to chronic blood loss   • BPH (benign prostatic hyperplasia)   • Colon polyps   • Diverticulosis   • Angiodysplasia of colon   • Atrial fibrillation   • Chronic combined systolic and diastolic heart failure   • Pulmonary HTN   • Cardiomyopathy   • Chronic coronary artery disease   • Medicare annual wellness visit, initial   • Testicle pain   • Sepsis     Past Medical History:   Diagnosis Date   • Acute arthritis    • Acute renal insufficiency    • Anemia    • Angiodysplasia     of colon   • Atherosclerosis of native coronary artery of native heart without angina pectoris    • Atopic dermatitis    • BPH (benign prostatic hyperplasia)    • Cardiomyopathy 3/20/2017   • Carotid artery disease     HAD ABBEY ENDARTERECTOMY   • CKD (chronic kidney disease)    • Coronary artery disease    • Diabetes mellitus, type II     TYPE 2   • Edema    • Elbow pain    • Gout    • Hematuria     HISTORY  RN phoned patient with recommendations.  Patient wants to go back on [place].  Script sent to Que per patient  Encounter closed     REASON WHY SURGERY WAS CANCELED 2 WEEKS AGO   • Hyperlipidemia    • Hypertension     OFF MEDICATION FOR NOW.    • Joint effusion of elbow    • Lightheadedness    • Malignant neoplasm of bladder     ALSO CANCER ON LT LEG   • Near syncope    • Nephrolithiasis    • Neuralgia    • Psychogenic dysuria    • Pulmonary HTN 3/20/2017   • Renal insufficiency syndrome    • Skin avulsion    • Urinary retention    • UTI (urinary tract infection)      Past Surgical History:   Procedure Laterality Date   • CAROTID ENDARTERECTOMY Bilateral    • CATARACT EXTRACTION Bilateral    • COLONOSCOPY      Complete   • COLONOSCOPY N/A 2/15/2017    Procedure: COLONOSCOPY to cecum and TI with cold and hot snare polypectomies;  Surgeon: Jae Rosado MD;  Location: [facility] ENDOSCOPY;  Service:    • CORONARY ARTERY BYPASS GRAFT  1980'S & 2003    2 different surgeries BOTH SURGERY 3 VESSELS DONE   • ENDOSCOPY N/A 2/15/2017    Procedure: ESOPHAGOGASTRODUODENOSCOPY with biopsies;  Surgeon: Jae Rosado MD;  Location: [facility] ENDOSCOPY;  Service:    • GALLBLADDER SURGERY     • SC DRAIN LOWER LEG DEEP ABSC/HEMATOMA Left 7/14/2016    Procedure: LOWER EXTREMITY DEBRIDEMENT, EXCISION OF LT LEG SKIN TUMOR WITH FULL THICKNESS SKIN GRAFT RECONSTRUCTION;  Surgeon: Molina Jackson MD;  Location: [facility] OR;  Service: Plastics   • TRANSURETHRAL RESECTION OF BLADDER TUMOR N/A 4/10/2017    Procedure: CYSTOSCOPY WITH CLOT EVACUATION;  Surgeon: Vel Rogel MD;  Location: [facility] OR;  Service:           PT ASSESSMENT (last 72 hours)      PT Evaluation       01/06/18 1018 01/03/18 1740    Rehab Evaluation    Document Type evaluation  -DO     Subjective Information no complaints;agree to therapy  -DO     Patient Effort, Rehab Treatment excellent  -DO     Symptoms Noted During/After Treatment none  -DO     Symptoms Noted Comment Pt did not demonstrate any adverse s/s throughout evaluation. VSS throughout eval.   -DO     General Information     General Observations Pt supine in bed upon entry to room; no acute distress noted.  -DO     Precautions/Limitations fall precautions  -DO     Prior Level of Function independent:;all household mobility;community mobility  -DO     Equipment Currently Used at Home none  -DO none  -SI    Plans/Goals Discussed With patient  -DO     Benefits Reviewed patient:;improve function;increase independence;increase strength  -DO     Barriers to Rehab none identified  -DO     Living Environment    Lives With spouse  -DO spouse  -SI    Living Arrangements house  -DO house  -SI    Home Accessibility stairs to enter home  -DO     Number of Stairs to Enter Home 3  -DO     Type of Financial/Environmental Concern none  -DO     Transportation Available car;family or friend will provide  -DO family or friend will provide  -SI    Clinical Impression    Criteria for Skilled Therapeutic Interventions Met yes  -DO     Pathology/Pathophysiology Noted (Describe Specifically for Each System) musculoskeletal  -DO     Impairments Found (describe specific impairments) aerobic capacity/endurance;gait, locomotion, and balance;muscle performance  -DO     Functional Limitations in Following Categories (Describe Specific Limitations) self-care;home management;community/leisure  -DO     Rehab Potential good, to achieve stated therapy goals  -DO     Pain Assessment    Pain Assessment No/denies pain  -DO     Vision Assessment/Intervention    Visual Impairment WFL  -DO     Cognitive Assessment/Intervention    Current Cognitive/Communication Assessment functional  -DO     Orientation Status oriented x 4  -DO     Follows Commands/Answers Questions 100% of the time;able to follow multi-step instructions  -DO     Personal Safety WNL/WFL  -DO     Personal Safety Interventions fall prevention program maintained;muscle strengthening facilitated;nonskid shoes/slippers when out of bed  -DO     ROM (Range of Motion)    General ROM no range of motion deficits  identified  -DO     MMT (Manual Muscle Testing)    General MMT Assessment Detail Pt demonstrates functional strength of at least 3+/5 in BUE/BLE.   -DO     Bed Mobility, Assessment/Treatment    Bed Mob, Supine to Sit, Wilson contact guard assist;verbal cues required  -DO     Bed Mob, Sit to Supine, Wilson not tested   Ended session in chair.   -DO     Bed Mobility, Safety Issues decreased use of legs for bridging/pushing  -DO     Bed Mobility, Comment Pt required HHA to assist in pulling himself up to the EOB.   -DO     Transfer Assessment/Treatment    Transfers, Sit-Stand Wilson minimum assist (75% patient effort);verbal cues required  -DO     Transfers, Stand-Sit Wilson minimum assist (75% patient effort);verbal cues required  -DO     Transfers, Sit-Stand-Sit, Assist Device rolling walker  -DO     Transfer, Safety Issues step length decreased;weight-shifting ability decreased  -DO     Transfer, Comment Pt required verbal cueing on hand placement prior to sit-stand transfer.   -DO     Gait Assessment/Treatment    Gait, Wilson Level stand by assist;contact guard assist;verbal cues required  -DO     Gait, Assistive Device rolling walker  -DO     Gait, Distance (Feet) 120  -DO     Gait, Gait Deviations maria isabel decreased;step length decreased;stride length decreased;toe-to-floor clearance decreased;weight-shifting ability decreased  -DO     Gait, Safety Issues sequencing ability decreased;step length decreased;weight-shifting ability decreased  -DO     Gait, Comment Pt required verbal cueing on placement within RWx during ambulation.   -DO     Stairs Assessment/Treatment    Stairs, Wilson Level not tested  -DO     Positioning and Restraints    Pre-Treatment Position in bed  -DO     Post Treatment Position chair  -DO     In Chair notified nsg;sitting;call light within reach;encouraged to call for assist  -DO       User Key  (r) = Recorded By, (t) = Taken By, (c) = Cosigned By     Initials Name Provider Type    SI Crissy Massey RN Case Manager    DO Trevor Harper PT Physical Therapist          Physical Therapy Education     Title: PT OT SLP Therapies (Done)     Topic: Physical Therapy (Done)     Point: Mobility training (Done)    Learning Progress Summary    Learner Readiness Method Response Comment Documented by Status   Patient Acceptance E VU  DO 01/06/18 1040 Done               Point: Home exercise program (Done)    Learning Progress Summary    Learner Readiness Method Response Comment Documented by Status   Patient Acceptance E VU  DO 01/06/18 1040 Done               Point: Body mechanics (Done)    Learning Progress Summary    Learner Readiness Method Response Comment Documented by Status   Patient Acceptance E VU  DO 01/06/18 1040 Done               Point: Precautions (Done)    Learning Progress Summary    Learner Readiness Method Response Comment Documented by Status   Patient Acceptance E VU  DO 01/06/18 1040 Done                      User Key     Initials Effective Dates Name Provider Type Discipline    DO 01/27/16 -  Trevor Harper PT Physical Therapist PT                PT Recommendation and Plan  Anticipated Equipment Needs At Discharge: front wheeled walker (Pending progress. )  Anticipated Discharge Disposition: home, home with assist  Planned Therapy Interventions: balance training, bed mobility training, gait training, stair training, strengthening, transfer training  PT Frequency: daily  Plan of Care Review  Plan Of Care Reviewed With: patient  Outcome Summary/Follow up Plan: Pt presents with generalized weakness and decreased activity tolerance. He was able to demonstrate bed mobility with CGAx1, transfers with MinAx1, and ambulated 120 ft with RWx and SBA-CGAx1. Pt will benefit from continuing skilled PT to address impairments and to improve independence with functional mobility. PT recommends d/c home with assistance when appropriate, pending progress.           IP PT  Goals       01/06/18 1041          Bed Mobility PT LTG    Bed Mobility PT LTG, Date Established 01/06/18  -DO      Bed Mobility PT LTG, Time to Achieve 1 wk  -DO      Bed Mobility PT LTG, Activity Type all bed mobility  -DO      Bed Mobility PT LTG, [place] Level independent  -DO      Transfer Training PT LTG    Transfer Training PT LTG, Date Established 01/06/18  -DO      Transfer Training PT LTG, Time to Achieve 1 wk  -DO      Transfer Training PT LTG, Activity Type all transfers  -DO      Transfer Training PT LTG, [place] Level independent;conditional independence  -DO      Transfer Training PT LTG, Assist Device walker, rolling  -DO      Gait Training PT LTG    Gait Training Goal PT LTG, Date Established 01/06/18  -DO      Gait Training Goal PT LTG, Time to Achieve 1 wk  -DO      Gait Training Goal PT LTG, [place] Level independent;conditional independence  -DO      Gait Training Goal PT LTG, Assist Device walker, rolling  -DO      Gait Training Goal PT LTG, Distance to Achieve 350  -DO        User Key  (r) = Recorded By, (t) = Taken By, (c) = Cosigned By    Initials Name Provider Type    DO Trevor Harper, PT Physical Therapist                Outcome Measures       01/06/18 1000          How much help from another person do you currently need...    Turning from your back to your side while in flat bed without using bedrails? 3  -DO      Moving from lying on back to sitting on the side of a flat bed without bedrails? 3  -DO      Moving to and from a bed to a chair (including a wheelchair)? 3  -DO      Standing up from a chair using your arms (e.g., wheelchair, bedside chair)? 3  -DO      Climbing 3-5 steps with a railing? 2  -DO      To walk in hospital room? 3  -DO      AM-[facility] 6 Clicks Score 17  -DO      Functional Assessment    Outcome Measure Options AM-PAC 6 Clicks Basic Mobility (PT)  -DO        User Key  (r) = Recorded By, (t) = Taken By, (c) = Cosigned By    Initials Name Provider Type     DO Trevor Harper PT Physical Therapist           Time Calculation:         PT Charges       01/06/18 1043          Time Calculation    Start Time 1018  -DO      Stop Time 1032  -DO      Time Calculation (min) 14 min  -DO      PT Received On 01/06/18  -DO      PT - Next Appointment 01/07/18  -DO      PT Goal Re-Cert Due Date 01/13/18  -DO        User Key  (r) = Recorded By, (t) = Taken By, (c) = Cosigned By    Initials Name Provider Type    DO Trevor Harper PT Physical Therapist          Therapy Charges for Today     Code Description Service Date Service Provider Modifiers Qty    12444320564 HC PT EVAL MOD COMPLEXITY 2 1/6/2018 Trevor Harper, PT GP 1    92826874993 HC PT THER PROC EA 15 MIN 1/6/2018 Trevor Harper, PT GP 1          PT G-Codes  Outcome Measure Options: AM-PAC 6 Clicks Basic Mobility (PT)      Trevor Harper PT  1/6/2018

## 2018-01-06 NOTE — PROGRESS NOTES
Continued Stay Note  [facility]     Patient Name: Imtiaz Jones  MRN: [id number]  Today's Date: 1/6/2018    Admit Date: 1/2/2018          Discharge Plan       01/06/18 1826    Case Management/Social Work Plan    Additional Comments Discharge orders noted. Called and left [facility] for [facility] ([id number]) to inform of discharge. [facility] to contact pt Sunday to make arrangements for 1st visit next week..........CODY De León, CCP              Discharge Codes     None        Expected Discharge Date and Time     Expected Discharge Date Expected Discharge Time    Jan 6, 2018             Champ Call RN

## 2018-01-06 NOTE — PROGRESS NOTES
"Patient Care Team:  Charles Rose MD as PCP - General (Family Medicine)  Charles Rose MD as PCP - Claims Attributed  Martita Grimm RN as Care Coordinator (Population Health)    Chief Complaint: Follow-up chest pain    Interval History:   No additional chest pain.  Patient states he feels well.    Objective   Vital Signs  Temp:  [97.5 °F (36.4 °C)-99.1 °F (37.3 °C)] 98.1 °F (36.7 °C)  Heart Rate:  [71-86] 76  Resp:  [1-20] 16  BP: ()/() 115/101    Intake/Output Summary (Last 24 hours) at 01/06/18 1333  Last data filed at 01/06/18 1100   Gross per 24 hour   Intake              360 ml   Output             3700 ml   Net            -3340 ml     Flowsheet Rows         First Filed Value    Admission Height  177.8 cm (70\") Documented at 01/02/2018 0247    Admission Weight  74.4 kg (164 lb) Documented at 01/02/2018 0247          General Appearance:    Alert, cooperative, in no acute distress   Head:    Normocephalic, without obvious abnormality, atraumatic       Neck:   No adenopathy, supple, no thyromegaly, no carotid bruit, no    JVD   Lungs:     Clear to auscultation bilaterally, no wheezes, rales, or     rhonchi    Heart:    Normal rate, regular rhythm,  No murmur, rub, or gallop   Chest Wall:    No abnormalities observed   Abdomen:     Normal bowel sounds, soft, non-tender, non-distended,            no rebound tenderness   Extremities:   No cyanosis, clubbing, or edema   Pulses:   Pulses palpable and equal bilaterally   Skin:   No bleeding or rash       Neurologic:   Cranial nerves 2 - 12 grossly intact, sensation intact             amoxicillin-clavulanate 1 tablet Oral Q12H   aspirin 81 mg Oral Daily   enoxaparin 1 mg/kg Subcutaneous Q24H   isosorbide mononitrate 30 mg Oral Q24H   torsemide 40 mg Oral Daily            Results Review:      Results from last 7 days  Lab Units 01/06/18  0454   SODIUM mmol/L 142   POTASSIUM mmol/L 3.7   CHLORIDE mmol/L 106   CO2 mmol/L 23.0   BUN mg/dL 56*   CREATININE " mg/dL 2.21*   GLUCOSE mg/dL 104*   CALCIUM mg/dL 8.3*       Results from last 7 days  Lab Units 01/02/18  1248 01/02/18  0826 01/02/18  0354   TROPONIN T ng/mL 1.220* 0.839* 0.342*       Results from last 7 days  Lab Units 01/06/18  0454   WBC 10*3/mm3 12.06*   HEMOGLOBIN g/dL 8.3*   HEMATOCRIT % 27.8*   PLATELETS 10*3/mm3 282               Results from last 7 days  Lab Units 01/06/18  0454   MAGNESIUM mg/dL 2.2           I reviewed the patient's new clinical results.  I personally viewed and interpreted the patient's EKG/Telemetry data       Assessment/Plan   1.  Sepsis  2.  Severe renal insufficiency  3.  CAD  4.  STEMI, anterior, on admission-as noted by Dr. Powell patient is an extremely high risk for cardiac catheterization.  Patient has been asymptomatic since presentation and his ejection fraction is unchanged from prior.  The recommendation from Dr. Powell has been to pursue a conservative course   5.  CAD  6.  AV dissociation/complete heart block on arrival in the setting of his STEMI-sinus rhythm since  7.  Hypertensive heart disease without heart failure  8.  Diabetes mellitus with circulatory complication    Chest pain-free.  Renal function is stable.  Okay to discharge from my standpoint.  Patient will need follow-up with Dr. Knowles in the next 3-4 weeks

## 2018-01-06 NOTE — NURSING NOTE
"Patient's daughter and wife provided with a list of \"[facility] Home Personal [facility] Agencies\" from [facility].   "

## 2018-01-06 NOTE — PLAN OF CARE
Problem: Patient Care Overview (Adult)  Goal: Plan of Care Review  Outcome: Ongoing (interventions implemented as appropriate)   01/06/18 7077   Outcome Evaluation   Outcome Summary/Follow up Plan VSS. Up with assist to bathroom. Flores catheter care. Skin care, q2 turn. Ambulated in hallway today. Loose stools, stool sample sent, pending. Will continue to monitor and base discharge on sample per MD.    Coping/Psychosocial Response Interventions   Plan Of Care Reviewed With patient   Patient Care Overview   Progress improving       Problem: Fall Risk (Adult)  Goal: Absence of Falls  Outcome: Ongoing (interventions implemented as appropriate)      Problem: Sepsis (Adult)  Goal: Signs and Symptoms of Listed Potential Problems Will be Absent or Manageable (Sepsis)  Outcome: Ongoing (interventions implemented as appropriate)      Problem: Pneumonia (Adult)  Goal: Signs and Symptoms of Listed Potential Problems Will be Absent or Manageable (Pneumonia)  Outcome: Ongoing (interventions implemented as appropriate)

## 2018-01-06 NOTE — PLAN OF CARE
Problem: Patient Care Overview (Adult)  Goal: Plan of Care Review  Outcome: Ongoing (interventions implemented as appropriate)   01/06/18 0786   Outcome Evaluation   Outcome Summary/Follow up Plan Pt rested well. No c/o pain, N/V or SOA. Scrotum elevated but still with edema. VSS< no s/s acute distress, will continue to monitor   Coping/Psychosocial Response Interventions   Plan Of Care Reviewed With patient   Patient Care Overview   Progress improving     Goal: Adult Individualization and Mutuality  Outcome: Ongoing (interventions implemented as appropriate)      Problem: Fall Risk (Adult)  Goal: Absence of Falls  Outcome: Ongoing (interventions implemented as appropriate)      Problem: Sepsis (Adult)  Goal: Signs and Symptoms of Listed Potential Problems Will be Absent or Manageable (Sepsis)  Outcome: Ongoing (interventions implemented as appropriate)      Problem: Pneumonia (Adult)  Goal: Signs and Symptoms of Listed Potential Problems Will be Absent or Manageable (Pneumonia)  Outcome: Ongoing (interventions implemented as appropriate)      Problem: Pressure Ulcer Risk (Jamal Scale) (Adult,Obstetrics,Pediatric)  Goal: Skin Integrity  Outcome: Ongoing (interventions implemented as appropriate)

## 2018-01-06 NOTE — PROGRESS NOTES
"   LOS: 4 days    Patient Care Team:  Charles Rose MD as PCP - General (Family Medicine)  Charles Rose MD as PCP - Claims Attributed  Martita Grimm RN as Care Coordinator (Population Health)    Chief Complaint:    Chief Complaint   Patient presents with   • Dizziness   • Nausea       Subjective follow-up acute kidney injury on chronic kidney disease    Interval History:   Feeling much better today, no chest pain or shortness of air, no orthopnea or PND, no nausea or vomiting, no abdominal pain.  He has Flores catheter anchored in place.  He has chronic changes of his lower extremities with chronic edema.      Review of Systems:   As noted above    Objective     Vital Signs  Temp:  [97.8 °F (36.6 °C)-99.1 °F (37.3 °C)] 98.1 °F (36.7 °C)  Heart Rate:  [73-86] 76  Resp:  [1-18] 16  BP: ()/() 115/101    Flowsheet Rows         First Filed Value    Admission Height  177.8 cm (70\") Documented at 01/02/2018 0247    Admission Weight  74.4 kg (164 lb) Documented at 01/02/2018 0247          I/O this shift:  In: -   Out: 650 [Urine:650]  I/O last 3 completed shifts:  In: 1080 [P.O.:1080]  Out: 3750 [Urine:3750]    Intake/Output Summary (Last 24 hours) at 01/06/18 1435  Last data filed at 01/06/18 1100   Gross per 24 hour   Intake              360 ml   Output             3700 ml   Net            -3340 ml       Physical Exam:  General Appearance: alert, oriented x 3, no acute distress, appears to be chronically ill  Skin: warm and dry, Chronic stasis dermatitis both lower extremities  HEENT: Nonicteric sclerae, oral mucosa normal,   Neck: supple, no JVD, trachea midline  Lungs: Bilateral rhonchi, unlabored breathing effort  Heart: RRR, normal S1 and S2, no S3, no rub  Abdomen: soft, non-tender,  present bowel sounds to auscultation  : Flores catheter anchored in place,no palpable bladder,  Extremities: Chronic lower extremity edema with 1+ pedal edema, And 1+ hip, also evidenced stasis dermatitis, no cyanosis " or clubbing.  Neuro: normal speech and mental status      Results Review:      Results from last 7 days  Lab Units 01/06/18  0454 01/05/18  0611 01/04/18  0334   SODIUM mmol/L 142 140 138   POTASSIUM mmol/L 3.7 3.8 3.8   CHLORIDE mmol/L 106 106 102   CO2 mmol/L 23.0 24.5 20.2*   BUN mg/dL 56* 59* 60*   CREATININE mg/dL 2.21* 2.21* 2.57*   CALCIUM mg/dL 8.3* 8.1* 8.0*   BILIRUBIN mg/dL 0.4 0.4 0.4   ALK PHOS U/L 85 84 89   ALT (SGPT) U/L 231* 286* 262*   AST (SGOT) U/L 116* 208* 212*   GLUCOSE mg/dL 104* 105* 108*       Estimated Creatinine Clearance: 26.9 mL/min (by C-G formula based on Cr of 2.21).      Results from last 7 days  Lab Units 01/06/18  0454 01/05/18  0611 01/04/18  0334   MAGNESIUM mg/dL 2.2 2.3 2.3   PHOSPHORUS mg/dL 3.3 3.3 3.8         Results from last 7 days  Lab Units 01/06/18  0454 01/05/18  0611 01/04/18  0334   URIC ACID mg/dL 12.3* 11.7* 11.7*         Results from last 7 days  Lab Units 01/06/18  0454 01/05/18  0611 01/04/18  0334 01/03/18  0440 01/02/18  0354   WBC 10*3/mm3 12.06* 9.31 8.88 7.93 14.49*   HEMOGLOBIN g/dL 8.3* 8.3* 8.8* 8.4* 10.0*   PLATELETS 10*3/mm3 282 227 203 171 181               Imaging Results (last 24 hours)     ** No results found for the last 24 hours. **          amoxicillin-clavulanate 1 tablet Oral Q12H   aspirin 81 mg Oral Daily   enoxaparin 1 mg/kg Subcutaneous Q24H   isosorbide mononitrate 30 mg Oral Q24H   torsemide 40 mg Oral Daily          Medication Review:   Current Facility-Administered Medications   Medication Dose Route Frequency Provider Last Rate Last Dose   • amoxicillin-clavulanate (AUGMENTIN) 500-125 MG per tablet 500 mg  1 tablet Oral Q12H Asia Gibson MD   500 mg at 01/06/18 0854   • aspirin EC tablet 81 mg  81 mg Oral Daily Carl Knowles III, MD   81 mg at 01/06/18 0854   • enoxaparin (LOVENOX) syringe 70 mg  1 mg/kg Subcutaneous Q24H Carl Knowles III, MD   70 mg at 01/06/18 1117   • isosorbide mononitrate (IMDUR) 24 hr tablet 30 mg  30 mg  Oral Q24H Shyam Powell MD   30 mg at 01/06/18 0854   • sodium chloride 0.9 % flush 10 mL  10 mL Intravenous PRN Pio Mauro MD       • torsemide (DEMADEX) tablet 40 mg  40 mg Oral Daily Molina Paris MD   40 mg at 01/06/18 0854   • zolpidem (AMBIEN) tablet 5 mg  5 mg Oral Nightly PRN Leandro Coy MD   5 mg at 01/05/18 2086       Assessment/Plan   1.  Acute kidney injury associated with the hypotension leading to decreased renal perfusion and probable ATN associated with the sepsis and non-ST MI, the creatinine is 2.21, which is improving, and his baseline is 1.5.  2.  Stage III chronic kidney disease, Baseline creatinine about 1.5.  3.  Acute to non-ST MI, ejection fraction about 30% with cardiomyopathy  4.  Sepsis treated.  5.  Hypotension associated with sepsis and then non-ST MI improved.  6.  Anemia, hemoglobin 8.3.  Baseline hemoglobin has been between 9 and 10, has iron deficiency and the iron is being repleted.  prior to admission,  7.  Hyperuricemia, uric acid 12.3      Plan:  1. The patient could be discharged from the renal standpoint.  2.  I'll up in our office on the January 18.          Molina Paris MD  01/06/18  2:35 PM

## 2018-01-06 NOTE — NURSING NOTE
C diff is negative, will proceed with discharge. [facility] will follow up with patient. Wife and daughter at bedside for transportation.

## 2018-01-06 NOTE — PROGRESS NOTES
LOS: 4 days   Patient Care Team:  Charles Rose MD as PCP - General (Family Medicine)  Charles Rose MD as PCP - Claims Attributed  Martita Grimm RN as Care Coordinator (Population Health)    Subjective     Patient says he feels pretty good today he's had no chest pain no abdominal pain no shortness of breath or cough.  He's had his chronic indwelling Flores for about 3 months now and is his understanding of his permanent    Review of Systems:          Objective     Vital Signs  Vital Sign Min/Max for last 24 hours  Temp  Min: 97.8 °F (36.6 °C)  Max: 99.1 °F (37.3 °C)   BP  Min: 95/56  Max: 119/56   Pulse  Min: 73  Max: 86   Resp  Min: 1  Max: 18   SpO2  Min: 90 %  Max: 92 %   No Data Recorded   Weight  Min: 84 kg (185 lb 3 oz)  Max: 84 kg (185 lb 3 oz)        Ventilator/Non-Invasive Ventilation Settings     None                       Body mass index is 26.57 kg/(m^2).  I/O last 3 completed shifts:  In: 1080 [P.O.:1080]  Out: 3750 [Urine:3750]  I/O this shift:  In: -   Out: 650 [Urine:650]        Physical Exam:  General Appearance: Well-developed elderly white male he is resting comfortably in bed.  Appears in no acute distress  Eyes: Conjunctiva are clear and anicteric  ENT: Mucous membranes are moist no erythema or exudates  Neck: No adenopathy or thyromegaly no jugular venous distention trachea midline  Lungs: Clear nonlabored symmetric expansion no wheezes rales or rhonchi  Cardiac: Regular rate and rhythm I do not hear murmur or gallop  Abdomen: Soft no palpable organomegaly or masses  : Indwelling Flores catheter  Musc/Skel: He does not have a lot of muscle mass  Skin: No jaundice no petechiae  Neuro: He is alert oriented cooperative he's moving all 4 extremities to command  Extremities/P Vascular: No clubbing cyanosis or edema he has palpable radial and dorsalis pedis pulses bilaterally  MSE: He seems in reasonably good spirits today       Labs:    Results from last 7 days  Lab Units  01/06/18 0454 01/05/18 0611 01/04/18 0334 01/03/18 0440 01/02/18  0354   GLUCOSE mg/dL 104* 105* 108* 91 124*   SODIUM mmol/L 142 140 138 144 141   POTASSIUM mmol/L 3.7 3.8 3.8 4.4 4.7   MAGNESIUM mg/dL 2.2 2.3 2.3  --  2.2   CO2 mmol/L 23.0 24.5 20.2* 19.9* 19.7*   CHLORIDE mmol/L 106 106 102 106 103   ANION GAP mmol/L 13.0 9.5 15.8 18.1 18.3   CREATININE mg/dL 2.21* 2.21* 2.57* 2.59* 2.30*   BUN mg/dL 56* 59* 60* 55* 45*   BUN / CREAT RATIO  25.3* 26.7* 23.3 21.2 19.6   CALCIUM mg/dL 8.3* 8.1* 8.0* 8.1* 9.1   EGFR IF NONAFRICN AM mL/min/1.73 28* 28* 24* 23* 27*   ALK PHOS U/L 85 84 89 97 80   TOTAL PROTEIN g/dL 6.2 6.2 6.1 6.1 7.1   ALT (SGPT) U/L 231* 286* 262* 278* 61*   AST (SGOT) U/L 116* 208* 212* 347* 102*   BILIRUBIN mg/dL 0.4 0.4 0.4 0.4 0.5   ALBUMIN g/dL 2.70* 2.60* 2.80* 2.60* 3.20*   GLOBULIN gm/dL 3.5 3.6 3.3 3.5 3.9   A/G RATIO g/dL 0.8 0.7 0.8 0.7 0.8     Estimated Creatinine Clearance: 26.9 mL/min (by C-G formula based on Cr of 2.21).        Results from last 7 days  Lab Units 01/06/18 0454 01/05/18 0611 01/04/18 0334 01/03/18 0440 01/02/18  0354   WBC 10*3/mm3 12.06* 9.31 8.88 7.93 14.49*   RBC 10*6/mm3 3.26* 3.22* 3.45* 3.29* 3.84*   HEMOGLOBIN g/dL 8.3* 8.3* 8.8* 8.4* 10.0*   HEMATOCRIT % 27.8* 27.0* 29.2* 27.9* 32.8*   MCV fL 85.3 83.9 84.6 84.8 85.4   MCH pg 25.5* 25.8* 25.5* 25.5* 26.0*   MCHC g/dL 29.9* 30.7* 30.1* 30.1* 30.5*   RDW % 16.9* 16.7* 16.5* 16.7* 16.5*   RDW-SD fl 51.6 51.1 50.5 51.8 51.6   MPV fL 10.4 10.3 10.7 11.0 10.1   PLATELETS 10*3/mm3 282 227 203 171 181   NEUTROPHIL % % 75.5 76.2* 74.7 75.9 86.3*   LYMPHOCYTE % % 12.3* 11.5* 12.3* 10.8* 4.1*   MONOCYTES % % 9.4 10.1 11.9 12.5* 9.4   EOSINOPHIL % % 0.2* 0.6 0.6 0.1* 0.0*   BASOPHIL % % 0.3 0.2 0.2 0.3 0.1   IMM GRAN % % 2.3* 1.4* 0.3 0.4 0.1   NEUTROS ABS 10*3/mm3 9.11* 7.09 6.63 6.02 12.50*   LYMPHS ABS 10*3/mm3 1.48 1.07 1.09 0.86* 0.60*   MONOS ABS 10*3/mm3 1.13 0.94 1.06 0.99 1.36*   EOS ABS 10*3/mm3 0.02 0.06  0.05 0.01 0.00   BASOS ABS 10*3/mm3 0.04 0.02 0.02 0.02 0.01   IMMATURE GRANS (ABS) 10*3/mm3 0.28* 0.13* 0.03 0.03 0.02           Results from last 7 days  Lab Units 01/02/18  1248 01/02/18  0826 01/02/18  0354   TROPONIN T ng/mL 1.220* 0.839* 0.342*       Results from last 7 days  Lab Units 01/02/18  0354   PROBNP pg/mL 27351.0*           Results from last 7 days  Lab Units 01/02/18  1248 01/02/18  0826 01/02/18  0409 01/02/18  0354   LACTATE mmol/L 1.9 1.9 3.3*  --    PROCALCITONIN ng/mL  --   --   --  2.42*         Microbiology Results (last 10 days)     Procedure Component Value - Date/Time    Urine Culture - Urine, Urine, Clean Catch [[id number]]  (Abnormal)  (Susceptibility) Collected:  01/02/18 0826    Lab Status:  Final result Specimen:  Urine from Urine, Clean Catch Updated:  01/04/18 0900     Urine Culture --      >100,000 CFU/mL Escherichia coli (A)    Susceptibility      Escherichia coli     CAITLIN     Ampicillin 4 ug/ml Susceptible     Ampicillin + Sulbactam <=2 ug/ml Susceptible     Cefazolin <=4 ug/ml Susceptible     Cefepime <=1 ug/ml Susceptible     Ceftriaxone <=1 ug/ml Susceptible     Ciprofloxacin <=0.25 ug/ml Susceptible     Ertapenem <=0.5 ug/ml Susceptible     Gentamicin <=1 ug/ml Susceptible     Levofloxacin <=0.12 ug/ml Susceptible     Nitrofurantoin <=16 ug/ml Susceptible     Piperacillin + Tazobactam <=4 ug/ml Susceptible     Tetracycline <=1 ug/ml Susceptible     Trimethoprim + Sulfamethoxazole <=20 ug/ml Susceptible                    Blood Culture - Blood, [[id number]]  (Normal) Collected:  01/02/18 0421    Lab Status:  Preliminary result Specimen:  Blood from Arm, Right Updated:  01/06/18 0431     Blood Culture No growth at 4 days    Blood Culture - Blood, [[id number]]  (Normal) Collected:  01/02/18 0411    Lab Status:  Preliminary result Specimen:  Blood from Arm, Left Updated:  01/06/18 0431     Blood Culture No growth at 4 days    Respiratory Panel, PCR - Swab, Nasopharynx [[id number]]   (Normal) Collected:  01/02/18 0411    Lab Status:  Final result Specimen:  Swab from Nasopharynx Updated:  01/02/18 0828     ADENOVIRUS, PCR Not Detected     Coronavirus 229E Not Detected     Coronavirus HKU1 Not Detected     Coronavirus NL63 Not Detected     Coronavirus OC43 Not Detected     Human Metapneumovirus Not Detected     Human Rhinovirus/Enterovirus Not Detected     Influenza B PCR Not Detected     Parainfluenza Virus 1 Not Detected     Parainfluenza Virus 2 Not Detected     Parainfluenza Virus 3 Not Detected     Parainfluenza Virus 4 Not Detected     Bordetella pertussis pcr Not Detected     Influenza A H1N1 2009 PCR Not Detected     Chlamydophila pneumoniae PCR Not Detected     Mycoplasma pneumo by PCR Not Detected     Influenza A PCR Not Detected     Influenza A H3 Not Detected     Influenza A H1 Not Detected     RSV, PCR Not Detected                amoxicillin-clavulanate 1 tablet Oral Q12H   aspirin 81 mg Oral Daily   enoxaparin 1 mg/kg Subcutaneous Q24H   isosorbide mononitrate 30 mg Oral Q24H   torsemide 40 mg Oral Daily          Diagnostics:  Xr Chest 1 View    Result Date: 1/3/2018  X-RAY CHEST 1 VIEW.  HISTORY: Dizziness and wheezing.  COMPARISON: 02/17/2017.  FINDINGS: Cardiomediastinal silhouette is within normal limits.     Pulmonary congestion with small left pleural effusion. Minimal right pleural effusion.       Findings are concerning for congestive heart failure in the right clinical setting. Small left, minimal right pleural effusion. Infiltrate in the left lung base cannot be excluded.  This report was finalized on 1/3/2018 2:25 AM by Dr. Hyacinth Britt MD.      Results for orders placed during the hospital encounter of 01/02/18   Adult Transthoracic Echo Complete W/ Cont if Necessary Per Protocol    Narrative · Left ventricular systolic function is moderately decreased. Estimated EF   = 30%. See diagram for segmental defects.  · Left ventricular diastolic dysfunction (grade II)  consistent with   pseudonormalization.  · Mild mitral valve regurgitation is present  · Mild tricuspid valve regurgitation is present.              Active Hospital Problems (** Indicates Principal Problem)    Diagnosis Date Noted   • **Sepsis [A41.9] 01/02/2018   • Cardiomyopathy [I42.9] 03/20/2017   • Chronic combined systolic and diastolic heart failure [I50.42] 02/17/2017   • Atrial fibrillation [I48.91] 02/16/2017   • Chronic kidney disease, stage 3 [N18.3] 02/13/2017   • Hypertension [I10] 03/22/2016   • Atherosclerosis of coronary artery [I25.10] 03/22/2016   • Hyperlipidemia [E78.5] 03/22/2016   • Type 2 diabetes mellitus with hyperglycemia [E11.65] 03/22/2016      Resolved Hospital Problems    Diagnosis Date Noted Date Resolved   No resolved problems to display.         Assessment/Plan     1. Escherichia coli urinary tract infection patient is on Augmentin he has a few more days to complete course.  We'll send him home with this to complete the course and she is tolerating it here  2. Sepsis with septic shock  3. Shock probably combination of sepsis and maybe some element of cardiogenic  4. ST elevation MI anteriorly on admission cardiology has followed Dr. Powell feels he is extremely high risk for cardiac catheterization he is been asymptomatic since presentation that his ejection fraction is unchanged recommendation is to pursue a conservative course he will follow-up with Dr. Knowles in 3-4 weeks  5. AV disassociation/complete heart block on arrival this was in the setting of an ST elevation MI he's been sinus rhythm since no treatment plan  6. Hypertensive heart disease  7. Diabetes mellitus type 2  8. Acute kidney injury on chronic kidney disease probably related to ATN from  9. Anemia iron deficient and chronic disease he is received IV iron  10. Hyperuricemia follow-up with nephrology hypotension with shock and MI.  11. Lactic acidosis secondary to shock resolved  12. Chronic urinary retention with  chronic indwelling Flores catheter  13. Diarrhea nurse just told me of this these had 5 loose stools today this could be due to the clavulanic acid annually has 1 tablet left on that but he has been on antibiotics and certainly the concern is C. difficile.  C. difficile toxin is artery been ordered if that is negative he can go home with some when necessary Imodium if that is positive then we'll need to keep him and treat him for several days for that prior to discharge    Plan for disposition: Plan is to discharge home today he is been cleared by cardiology and nephrology.  He will follow up with those as ordered.  I discussed with the patient he is going home with his wife they feel they can take care of his needs adequately at home.  He did not feel that any home health help as necessary.    Ben Olsen MD  01/06/18  2:27 PM    Time:

## 2018-01-06 NOTE — NURSING NOTE
Discharge pending per stool sample. Patient placed in isolation, contact spore. Awaiting sample, will continue to monitor.

## 2018-01-06 NOTE — DISCHARGE SUMMARY
Discharge summary    Diagnoses:  1. Escherichia coli urinary tract infection  2. Sepsis secondary #1  3. Shock probably combination of septic and may be an element of cardiogenic  4. ST elevation anterior MI  5. Complete heart block/AV disassociation secondary to MI  6. Acute kidney injury on chronic kidney disease probably related to ATN from shock  7. Hypertension with hypertensive heart disease  8. Diabetes mellitus type 2  9. Anemia iron deficient and chronic disease  10. Hyperuricemia  11. Lactic acidosis  12. Chronic urinary retention with chronic indwelling Flores catheter  13. Diarrhea      Hospital course: Patient was admitted to the ICU with shock he has a chronic indwelling Flores catheter.  Greater than 10 to the sixth Escherichia coli he also had evidence for an ST elevation anterior MI.  He has chronic kidney disease stage IV and chronic urinary retention with chronic indwelling Flores catheter.  He was felt to be very high risk for cardiac catheterization his ejection fraction was unchanged and he actually recovered fairly well from his MI when he came in he said some complete heart block that resolved as well as been in sinus rhythm since.  Cardiology felt that it was best to pursue  a conservative course with medical management only which is been done cardiology has cleared patient for discharge as his nephrology he has follow up with both Dr. Carl Knowles of cardiology in 3-4 weeks and with Dr. Warren on 1/18/70 he is to keep both of those he is to follow-up with his primary care physician in about 1 week.  Patient just developed some diarrhea this may be related to the antibiotic either directly antibiotic or the risk of C. difficile colitis we will send a stool for C. difficile toxin if that is negative we'll go ahead and discharge him home if that is positive then we will keep him for a couple of days and give him I treated on course before he goes.  I told him if he continues to have diarrhea  and is not C. difficile goes home he can use over-the-counter Imodium as needed for his diarrhea.  For reference purposes creatinine is 2.21 today and it's been stable at this level for a couple of days.  He has had some mildly elevated transaminases thought related to shock and MI they have been slowly improving his ALT is 231 and his AST is 116 do recommend he have those followed up in a week with his primary care physician.  His uric acid is 12.3.  His hemoglobin is 8.3 and has been stable.

## 2018-01-08 NOTE — PROGRESS NOTES
Case Management Discharge Note    Final Note: Pt [facility]'d [facility] [place]     Discharge Placement     Facility/Agency Request Status Selected? Address Phone Number Fax Number    [facility] Accepted    Yes [address] [address] [phone]        Crissy Massey RN 1/3/2018 17:44    Left VM                   Other:  (private vehicle )    Discharge Codes: 06  Discharged/transferred to home under care of organized home health service organization in anticipation of skilled care

## 2018-01-22 PROBLEM — L98.491: Status: ACTIVE | Noted: 2018-01-01

## 2018-01-22 NOTE — PROGRESS NOTES
Imtiaz Jones is a 89 y.o. male.      Assessment/Plan   Problem List Items Addressed This Visit        Cardiovascular and Mediastinum    Hypertension (Chronic)    Chronic coronary artery disease       Musculoskeletal and Integument    Skin ulcer, chronic, limited to breakdown of skin       Genitourinary    Chronic kidney disease, stage 3 - Primary (Chronic)    Relevant Orders    Comprehensive Metabolic Panel (Completed)           Follow-up results of blood work he will apply Duoderm to buttocks weekly follow-up otherwise in 3 months or as needed      Chief Complaint   Patient presents with   • [facility] follow up to heart attack     Social History   Substance Use Topics   • Smoking status: Former Smoker     Packs/day: 0.50     Years: 30.00     Types: Cigarettes, Pipe, Cigars   • Smokeless tobacco: Never Used      Comment: 25 YR AGO   • Alcohol use No      Comment: caffeine use       History of Present Illness   Patient status post MI with sepsis January 2 admission with discharge January 6.  he does not have anymore diarrhea he has no pain he is to be followed by urology for catheter management.  His blood pressure is well-controlled face creatinine gradually gaining his strength is CMP was markedly abnormal and discharge and they'll be followed up today he's taken iron for iron deficiency anemia and chronic anemia related to chronic renal failure.  He is concerned that he has developed a sore in his sacral area and is trying to not have sedentary position or laying on his back.  He is still having difficulty with nutrition is taking shakes and 90 knee as much as he would like to just doesn't have much of an appetite only doesn't have any fever nausea vomiting or diarrhea  Current outpatient and discharge medications have been reconciled for the patient.  Charles Rose MD  The following portions of the patient's history were reviewed and updated as appropriate:PMHroutine: Social history , Past Medical  "History, Surgical history , Allergies, Current Medications, Active Problem List, Family History and Health Maintenance    Review of Systems   Constitutional: Negative.    HENT: Negative.    Respiratory: Negative.    Cardiovascular: Negative for chest pain and leg swelling.   Gastrointestinal: Negative.    Genitourinary: Positive for testicular pain. Negative for genital sores and penile pain.        Indwelling catheter   Skin: Positive for wound.   Neurological: Negative.    Hematological: Negative.    Psychiatric/Behavioral: Negative.        Objective   Vitals:    01/22/18 1037   BP: 114/68   BP Location: Right arm   Patient Position: Sitting   Cuff Size: Adult   Pulse: 98   Temp: 97.9 °F (36.6 °C)   TempSrc: Oral   SpO2: 96%   Weight: 64.3 kg (141 lb 12.8 oz)   Height: 177.8 cm (70\")     Body mass index is 20.35 kg/(m^2).  Physical Exam   Constitutional: He is oriented to person, place, and time.   HENT:   Head: Normocephalic and atraumatic.   Eyes: Pupils are equal, round, and reactive to light. Right eye exhibits no discharge. Left eye exhibits no discharge.   Cardiovascular: Normal rate.    Pulmonary/Chest: Effort normal and breath sounds normal.   Abdominal: Soft. Bowel sounds are normal. He exhibits no distension. There is no tenderness.   Lymphadenopathy:     He has no cervical adenopathy.   Neurological: He is alert and oriented to person, place, and time.   Skin:   Sacral decubitus grade 1 1 x 2 cm   Psychiatric: He has a normal mood and affect. His behavior is normal. Judgment and thought content normal.   Nursing note and vitals reviewed.    Reviewed Data:  Office Visit on 01/22/2018   Component Date Value Ref Range Status   • Glucose 01/22/2018 183* 65 - 99 mg/dL Final   • BUN 01/22/2018 47* 8 - 23 mg/dL Final   • Creatinine 01/22/2018 2.02* 0.76 - 1.27 mg/dL Final   • eGFR Non African Am 01/22/2018 31* >60 mL/min/1.73 Final    Comment: The MDRD GFR formula is only valid for adults with stable  renal " function between ages 18 and 70.     • eGFR  Am 01/22/2018 38* >60 mL/min/1.73 Final   • BUN/Creatinine Ratio 01/22/2018 23.3  7.0 - 25.0 Final   • Sodium 01/22/2018 141  136 - 145 mmol/L Final   • Potassium 01/22/2018 4.3  3.5 - 5.2 mmol/L Final   • Chloride 01/22/2018 97* 98 - 107 mmol/L Final   • Total CO2 01/22/2018 28.1  22.0 - 29.0 mmol/L Final   • Calcium 01/22/2018 9.0  8.6 - 10.5 mg/dL Final   • Total Protein 01/22/2018 7.3  6.0 - 8.5 g/dL Final   • Albumin 01/22/2018 3.50  3.50 - 5.20 g/dL Final   • Globulin 01/22/2018 3.8  gm/dL Final   • A/G Ratio 01/22/2018 0.9  g/dL Final   • Total Bilirubin 01/22/2018 0.4  0.1 - 1.2 mg/dL Final   • Alkaline Phosphatase 01/22/2018 95  39 - 117 U/L Final   • AST (SGOT) 01/22/2018 20  1 - 40 U/L Final   • ALT (SGPT) 01/22/2018 18  1 - 41 U/L Final   Admission on 01/02/2018, Discharged on 01/06/2018   No results displayed because visit has over 200 results.

## 2018-01-24 NOTE — TELEPHONE ENCOUNTER
Merlyn with [facility] ([phone]) called stating that patient was prescribed DuoDerm and this will cost him $25.00.  She gave patient 3 of these and asked if she could have an order so that patient would not have to pay for this at the pharmacy.  Please advise.

## 2018-01-31 NOTE — PROGRESS NOTES
Date of Office Visit: 2018  Encounter Provider: CALDERON Roblero  Place of Service: [facility] CARDIOLOGY  Patient Name: Imtiaz Jones  :1928    Chief Complaint   Patient presents with   • Follow-up     Hospital for acute MI    :     HPI: Imtiaz Jones is a 89 y.o. male who presents today for hospital follow-up.  He is a new patient to me and his previous records have been requested and reviewed.      He is an established patient of Dr. Carl Knowles and has a history of coronary artery disease, status post CABG  in , last stress test 2015 negative, hypertension, pulmonary hypertension, hyperlipidemia, chronic kidney disease, anemia, GI bleed, bladder cancer, former smoker, and type 2 diabetes mellitus.  In 2017 he was hospitalized for near-syncope and was noted on telemetry monitor to have a brief episode of atrial fibrillation before converting back to a normal rhythm.  He has not been anticoagulated for stroke prevention because of GI bleeding.  He had an echocardiogram which showed EF of 36%, moderate tricuspid valve regurgitation, mild to moderate mitral valve regurgitation, and mild pulmonic valve regurgitation.  He wore a Holter monitor which showed the predominant rhythm to be sinus with occasional PACs and nonsustained atrial tachycardia with the longest 4 beats in duration.  In 2017 he reported that nephrology discontinued his lisinopril and diuretic therapy.    Mr. Jones recently presented to the [facility] emergency department on 18.  He reported falling out of bed when he became dizzy and nauseated.  EMS was contacted and he was taken to the hospital.  He was admitted for sepsis, pneumonia, urinary tract infection, and acute renal failure.  He had 3 EKGs performed and reviewed by Dr. Knowles 1 showing junctional escape rhythm and the other on atrial fibrillation.  He had an elevated troponin which was  "consistent with ST elevation myocardial infarction.  He had a repeat echocardiogram which showed the following: EF 29.4%, grade 2 diastolic dysfunction, aortic valve sclerosis, mild mitral annular calcification with mild mitral valve regurgitation, and mild tricuspid valve regurgitation.    He presents today for follow-up.  He said is been doing okay since leaving the hospital except he has decreased appetite.  He says that all food tastes like \"sawdust\".  He denies chest pain, shortness of air, paroxysmal nocturnal dyspnea, orthopnea, cough, palpitations, dizziness, or syncope.  He does experience edema and fatigue.  He currently still has his Flores catheter in place is seeing urology later today.  His blood pressure is on the low today and he is asymptomatic.    The following portion of the patient's history were reviewed and updated as appropriate: past medical history, past surgical history, past social history, past family history, allergies, current medications, and problem list.    Past Medical History:   Diagnosis Date   • Acute arthritis    • Acute renal insufficiency    • Anemia    • Angiodysplasia     of colon   • Atherosclerosis of native coronary artery of native heart without angina pectoris    • Atopic dermatitis    • BPH (benign prostatic hyperplasia)    • Cardiomyopathy 3/20/2017   • Carotid artery disease     HAD ABBEY ENDARTERECTOMY   • CKD (chronic kidney disease)    • Coronary artery disease    • Diabetes mellitus, type II     TYPE 2   • Edema    • Elbow pain    • Gout    • Hematuria     HISTORY REASON WHY SURGERY WAS CANCELED 2 WEEKS AGO   • Hyperlipidemia    • Hypertension     OFF MEDICATION FOR NOW.    • Joint effusion of elbow    • Lightheadedness    • Malignant neoplasm of bladder     ALSO CANCER ON LT LEG   • Near syncope    • Nephrolithiasis    • Neuralgia    • Psychogenic dysuria    • Pulmonary HTN 3/20/2017   • Renal insufficiency syndrome    • Skin avulsion    • Urinary retention    • " UTI (urinary tract infection)        Past Surgical History:   Procedure Laterality Date   • CAROTID ENDARTERECTOMY Bilateral    • CATARACT EXTRACTION Bilateral    • COLONOSCOPY      Complete   • COLONOSCOPY N/A 2/15/2017    Procedure: COLONOSCOPY to cecum and TI with cold and hot snare polypectomies;  Surgeon: Jae Rosado MD;  Location: [facility] ENDOSCOPY;  Service:    • CORONARY ARTERY BYPASS GRAFT  1980'S & 2003    2 different surgeries BOTH SURGERY 3 VESSELS DONE   • ENDOSCOPY N/A 2/15/2017    Procedure: ESOPHAGOGASTRODUODENOSCOPY with biopsies;  Surgeon: Jae Rosado MD;  Location: [facility] ENDOSCOPY;  Service:    • GALLBLADDER SURGERY     • NH DRAIN LOWER LEG DEEP ABSC/HEMATOMA Left 7/14/2016    Procedure: LOWER EXTREMITY DEBRIDEMENT, EXCISION OF LT LEG SKIN TUMOR WITH FULL THICKNESS SKIN GRAFT RECONSTRUCTION;  Surgeon: Molina Jackson MD;  Location: [facility] OR;  Service: Plastics   • TRANSURETHRAL RESECTION OF BLADDER TUMOR N/A 4/10/2017    Procedure: CYSTOSCOPY WITH CLOT EVACUATION;  Surgeon: Vel Rogel MD;  Location: [facility] OR;  Service:        Social History     Social History   • Marital status:      Spouse name: N/A   • Number of children: 4   • Years of education: N/A     Occupational History   • retired      Social History Main Topics   • Smoking status: Former Smoker     Packs/day: 0.50     Years: 30.00     Types: Cigarettes, Pipe, Cigars   • Smokeless tobacco: Never Used      Comment: 25 YR AGO   • Alcohol use No      Comment: caffeine use   • Drug use: No   • Sexual activity: Defer       Family History   Problem Relation Age of Onset   • Heart failure Mother      congestive   • Lung cancer Father    • Diabetes Brother      mellitus       Review of Systems   Constitution: Positive for malaise/fatigue. Negative for chills, diaphoresis, fever, night sweats, weight gain and weight loss.   HENT: Negative for hearing loss, nosebleeds, sore throat and tinnitus.    Eyes:  Negative for blurred vision, double vision, pain and visual disturbance.   Cardiovascular: Positive for leg swelling. Negative for chest pain, claudication, cyanosis, dyspnea on exertion, irregular heartbeat, near-syncope, orthopnea, palpitations, paroxysmal nocturnal dyspnea and syncope.   Respiratory: Negative for cough, hemoptysis, shortness of breath, snoring and wheezing.    Endocrine: Negative for cold intolerance, heat intolerance and polyuria.   Hematologic/Lymphatic: Negative for bleeding problem. Does not bruise/bleed easily.   Skin: Positive for rash. Negative for color change, dry skin, flushing and itching.   Musculoskeletal: Negative for falls, joint pain, joint swelling, muscle cramps, muscle weakness and myalgias.   Gastrointestinal: Negative for abdominal pain, constipation, heartburn, melena, nausea and vomiting.   Genitourinary: Negative for dysuria and hematuria.   Neurological: Negative for excessive daytime sleepiness, dizziness, light-headedness, loss of balance, numbness, paresthesias, seizures and vertigo.   Psychiatric/Behavioral: Negative for altered mental status, depression, memory loss and substance abuse. The patient does not have insomnia and is not nervous/anxious.    Allergic/Immunologic: Negative for environmental allergies.       Allergies   Allergen Reactions   • Iodine Hives   • Keflex [Cephalexin] Hives     Tolerated Zosyn during 01/2018 admission         Current Outpatient Prescriptions:   •  aspirin 81 MG EC tablet, Take 1 tablet by mouth Daily., Disp: 100 tablet, Rfl: 0  •  isosorbide mononitrate (IMDUR) 30 MG 24 hr tablet, Take 1 tablet by mouth Daily., Disp: 30 tablet, Rfl: 0  •  simvastatin (ZOCOR) 80 MG tablet, TAKE 1 TABLET DAILY IN THE EVENING, Disp: 90 tablet, Rfl: 2  •  torsemide (DEMADEX) 20 MG tablet, Take 2 tablets by mouth Daily., Disp: 60 tablet, Rfl: 0  •  traMADol (ULTRAM) 50 MG tablet, TAKE ONE-HALF TABLET BY MOUTH EVERY 8 HOURS AS NEEDED FOR TESTICLE PAIN,  "Disp: 45 tablet, Rfl: 0     Objective:     Vitals:    01/31/18 1117   BP: 90/60   Pulse: 87   Weight: 62.3 kg (137 lb 6.4 oz)   Height: 177.8 cm (70\")     Body mass index is 19.71 kg/(m^2).    PHYSICAL EXAM:    Vitals Reviewed.   General Appearance: No acute distress, well developed and well nourished. Thin.  Eyes: Conjunctiva and lids: No erythema, swelling, or discharge. Sclera non-icteric.   HENT: Atraumatic, normocephalic. External eyes, ears, and nose normal. No hearing loss noted. Mucous membranes normal. Lips not cyanotic. Neck supple with no tenderness.  Respiratory: No signs of respiratory distress. Respiration rhythm and depth normal.   Clear to auscultation. No rales, crackles, rhonchi, or wheezing auscultated.   Cardiovascular:  Jugular Venous Pressure: Normal  Heart Rate and Rhythm: Normal, Heart Sounds: Normal S1 and S2. No S3 or S4 noted.  Murmurs: No murmurs noted. No rubs, thrills, or gallops.   Arterial Pulses: Carotid pulses normal. No carotid bruit noted. Posterior tibialis and dorsalis pedis pulses normal.   Lower Extremities: No edema noted.  Gastrointestinal:  Abdomen soft, non-distended, non-tender. Normal bowel sounds. No hepatomegaly.   Musculoskeletal: Normal movement of extremities  Flores catheter in place.   Skin and Nails: General appearance normal. No pallor, cyanosis, diaphoresis. Skin temperature normal. No clubbing of fingernails.   Psychiatric: Patient alert and oriented to person, place, and time. Speech and behavior appropriate. Normal mood and affect.       ECG 12 Lead  Date/Time: 1/31/2018 11:12 AM  Performed by: REBEKAH HOLLY  Authorized by: REBEKAH HOLLY   Comparison: compared with previous ECG from 1/4/2018  Comparison to previous ECG: Normal sinus rhythm with Q waves in the anterior leads  Rhythm: sinus rhythm  Rate: normal  BPM: 87  Conduction: 1st degree and non-specific intraventricular conduction delay  Q waves: V1, V2, V3, II, III and aVF  Clinical impression: " abnormal ECG  Comments: ST-T abnormalities              Assessment:       Diagnosis Plan   1. Acute ST elevation myocardial infarction (STEMI), unspecified artery     2. Coronary artery disease involving native coronary artery of native heart without angina pectoris     3. Chronic combined systolic and diastolic heart failure     4. Cardiomyopathy, unspecified type     5. Sepsis due to Escherichia coli            Plan:       1. Coronary Artery Disease  Assessment  • The patient has no angina    Plan  • Lifestyle modifications discussed include adhering to a heart healthy diet, avoidance of tobacco products, maintenance of a healthy weight, medication compliance, regular exercise and regular monitoring of cholesterol and blood pressure  • He denies any anginal symptoms  I will further discuss the plan care Dr. Carl Knowles    Subjective - Objective  • There is a history of past MI  • There is a history of previous coronary artery bypass graft  • Current antiplatelet therapy includes aspirin 81 mg        2. Heart Failure  Assessment  • NYHA class II - There is slight limitation of physical activity. The patient is comfortable at rest, but physical activity results in fatigue, palpitations or shortness of breath.  • Diuretics prescribed  • Left ventricular function is moderately reduced by qualitative assessment    Plan  • The patient has received heart failure education on the following topics: medication instructions        3.  Sepsis/UTI: He is following up with urology today and still has the Flores catheter in place.    4.  Decreased appetite and weight loss: He is also significant amount of weight since leaving the hospital.  He says everything tastes like sawdust. Since his visit with Dr. Knowles in September the isosorbide and torsemide are new.  Other medications listed decreased appetite or bad taste in mouth as an adverse effect.  I'm going to discuss the patient that he needs to further discuss these adverse  effects with his pharmacist.  He also may benefit from nutritional consult and I'll defer to his primary care provider.    5.  Follow-up: I'll further discuss the plan care Dr. Carl Knowles and arrange follow-up.    Addendum 2/9/18: I discussed the plan of care with Dr. Carl Knowles.  He does not feel that his cardiac medications are contributing to his weight loss or bad taste in his mouth.  I spoke with the patient and he was told that it may be from his antibiotic.  Dr. Knowles has recommended that he follow-up with him in 6 months.  Mr. Jones verbalized understanding.    As always, it has been a pleasure to participate in your patient's care.      Sincerely,         CALDERON Story

## 2018-02-02 PROBLEM — N39.0 ACUTE UTI: Status: ACTIVE | Noted: 2018-01-01

## 2018-02-02 PROBLEM — K59.00 CONSTIPATION: Status: ACTIVE | Noted: 2018-01-01

## 2018-02-02 PROBLEM — R33.9 URINARY RETENTION: Status: ACTIVE | Noted: 2018-01-01

## 2018-02-02 NOTE — PROGRESS NOTES
Clinical Pharmacy Services: Medication History    Imtiaz Jones is a 89 y.o. male presenting to [facility] for   Chief Complaint   Patient presents with   • Constipation     last BM 4 days ago. pt tried fleet enema without relief.       He  has a past medical history of Acute arthritis; Acute renal insufficiency; Anemia; Angiodysplasia; Atherosclerosis of native coronary artery of native heart without angina pectoris; Atopic dermatitis; BPH (benign prostatic hyperplasia); Cardiomyopathy (3/20/2017); Carotid artery disease; CKD (chronic kidney disease); Coronary artery disease; Diabetes mellitus, type II; Edema; Elbow pain; Gout; Hematuria; Hyperlipidemia; Hypertension; Joint effusion of elbow; Lightheadedness; Malignant neoplasm of bladder; Near syncope; Nephrolithiasis; Neuralgia; Psychogenic dysuria; Pulmonary HTN (3/20/2017); Renal insufficiency syndrome; Skin avulsion; Urinary retention; and UTI (urinary tract infection).    Allergies as of 02/02/2018 - Prashanth as Reviewed 02/02/2018   Allergen Reaction Noted   • Iodine Hives 11/11/2015   • Keflex [cephalexin] Hives 07/13/2016       Medication information was obtained from: Patient  Pharmacy and Phone Number: [facility] [phone]    Prior to Admission Medications     Prescriptions Last Dose Informant Patient Reported? Taking?    aspirin 81 MG EC tablet 2/2/2018 Self No Yes    Take 1 tablet by mouth Daily.    isosorbide mononitrate (IMDUR) 30 MG 24 hr tablet 2/2/2018 Self No Yes    Take 1 tablet by mouth Daily.    simvastatin (ZOCOR) 80 MG tablet 2/1/2018 Self Yes Yes    Take 80 mg by mouth Every Evening.    torsemide (DEMADEX) 20 MG tablet 2/2/2018 Self No Yes    Take 2 tablets by mouth Daily.    traMADol (ULTRAM) 50 MG tablet 2/2/2018 Self Yes Yes    Take 25 mg by mouth Every 8 (Eight) Hours As Needed for Moderate Pain .            Medication notes: None    This medication list is complete to the best of my knowledge as of 2/2/2018    Please call  if questions.    Maria A Spencer, Medication History Technician  2/2/2018 6:07 PM

## 2018-02-02 NOTE — ED PROVIDER NOTES
Patient presents to the ED with c/o constipation x3 days. He has a h/o bladder cancer and was receiving tx by Dr. Rogel with treatment discontinued 1 year ago. Since then, patient has been able to urinate normally.     PHYSICAL EXAM    Constitutional: A&Ox3, in NAD  Cardio: RRR  Pulmonary: CTAB  Abdominal: soft, benign      Reviewed CT Abd/Pelvis which is concerning for pulmonary METS. Plan to admit to medicine for hydration and urology consultation.   I supervised care provided by the midlevel provider.    We have discussed this patient's history, physical exam, and treatment plan.   I have reviewed the note and personally saw and examined the patient and agree with the plan of care.    Documentation assistance provided by carmen Mack for .  Information recorded by the scribjessica was done at my direction and has been verified and validated by me.       Martita Mack  02/02/18 [id number]       Shyam Mckoy MD  02/02/18 [id number]

## 2018-02-02 NOTE — H&P
[facility] Admission H&P    Patient Care Team:  Charles Rose MD as PCP - General (Family Medicine)  Charles Rose MD as PCP - Claims Attributed  Martita Grimm RN as Care Coordinator (Population Health)    Chief complaint: No bowel movement in 4 days.  Cloudy urine.    History of Present Illness    This is an 89-year-old male with a complicated past medical history including bladder cancer status post chemoradiation but no longer on treatment and who elected not to undergo bladder removal, chronic kidney disease stage III/IV, chronic urinary retention requiring Flores catheter, CAD, diabetes and numerous other medical problems as listed below who presented to the emergency room with complaints of inability to pass stool for 4 days.  He denies any associated abdominal pain, nausea, or vomiting.  He normally does not have issues with constipation.  He tried a fleets enema at home but this was unsuccessful and thus he decided to come in to be seen.  He also complains of change in quality of his urine and states that it is now more cloudy.  He states that his catheter was changed 2 days ago.  The patient was admitted last month for Escherichia coli sepsis and shock as well as MI that was treated medically.  He states he has done well since discharge until these issues with constipation arose.  His only other issue since his recent discharge has been lack of appetite.  He says that everything tastes like sawdust.  He feels as if he has lost 20 pounds in the last 3 weeks.  He denies chest pain, shortness of breath, cough, palpitations, fevers or chills.    Past Medical History:   Diagnosis Date   • Acute arthritis    • Acute renal insufficiency    • Anemia    • Angiodysplasia     of colon   • Atherosclerosis of native coronary artery of native heart without angina pectoris    • Atopic dermatitis    • BPH (benign prostatic hyperplasia)    • Cardiomyopathy 3/20/2017   • Carotid artery disease     HAD ABBEY ENDARTERECTOMY   •  CKD (chronic kidney disease)    • Coronary artery disease    • Diabetes mellitus, type II     TYPE 2   • Edema    • Elbow pain    • Gout    • Hematuria     HISTORY REASON WHY SURGERY WAS CANCELED 2 WEEKS AGO   • Hyperlipidemia    • Hypertension     OFF MEDICATION FOR NOW.    • Joint effusion of elbow    • Lightheadedness    • Malignant neoplasm of bladder     ALSO CANCER ON LT LEG   • Near syncope    • Nephrolithiasis    • Neuralgia    • Psychogenic dysuria    • Pulmonary HTN 3/20/2017   • Renal insufficiency syndrome    • Skin avulsion    • Urinary retention    • UTI (urinary tract infection)      Past Surgical History:   Procedure Laterality Date   • CAROTID ENDARTERECTOMY Bilateral    • CATARACT EXTRACTION Bilateral    • COLONOSCOPY      Complete   • COLONOSCOPY N/A 2/15/2017    Procedure: COLONOSCOPY to cecum and TI with cold and hot snare polypectomies;  Surgeon: Jae Rosado MD;  Location: [facility] ENDOSCOPY;  Service:    • CORONARY ARTERY BYPASS GRAFT  1980'S & 2003    2 different surgeries BOTH SURGERY 3 VESSELS DONE   • ENDOSCOPY N/A 2/15/2017    Procedure: ESOPHAGOGASTRODUODENOSCOPY with biopsies;  Surgeon: Jae Rosado MD;  Location: [facility] ENDOSCOPY;  Service:    • GALLBLADDER SURGERY     • IL DRAIN LOWER LEG DEEP ABSC/HEMATOMA Left 7/14/2016    Procedure: LOWER EXTREMITY DEBRIDEMENT, EXCISION OF LT LEG SKIN TUMOR WITH FULL THICKNESS SKIN GRAFT RECONSTRUCTION;  Surgeon: Molina Jackson MD;  Location: [facility] OR;  Service: Plastics   • TRANSURETHRAL RESECTION OF BLADDER TUMOR N/A 4/10/2017    Procedure: CYSTOSCOPY WITH CLOT EVACUATION;  Surgeon: Vel Rogel MD;  Location: [facility] OR;  Service:      Family History   Problem Relation Age of Onset   • Heart failure Mother      congestive   • Lung cancer Father    • Diabetes Brother      mellitus     Social History   Substance Use Topics   • Smoking status: Former Smoker     Packs/day: 0.50     Years: 30.00     Types: Cigarettes,  Pipe, Cigars   • Smokeless tobacco: Never Used      Comment: 25 YR AGO   • Alcohol use No      Comment: caffeine use     Medications reviewed    Allergies:  Iodine and Keflex [cephalexin]    Review of Systems   Constitutional: Positive for appetite change and unexpected weight change. Negative for chills and fever.   HENT: Negative for congestion and sore throat.    Eyes: Negative for visual disturbance.   Respiratory: Negative for cough, chest tightness, shortness of breath and wheezing.    Cardiovascular: Negative for chest pain, palpitations and leg swelling.   Gastrointestinal: Positive for constipation. Negative for abdominal distention, abdominal pain, diarrhea, nausea and vomiting.   Endocrine: Negative for polydipsia and polyuria.   Genitourinary: Negative for difficulty urinating, dysuria, frequency and urgency.        Urine is now cloudy   Musculoskeletal: Negative for arthralgias and myalgias.   Skin: Negative for color change and rash.   Neurological: Negative for dizziness and light-headedness.        PHYSICAL EXAM    Vital Signs  tMax 24 hrs:  Temp (24hrs), Av.6 °F (35.9 °C), Min:96.6 °F (35.9 °C), Max:96.6 °F (35.9 °C)    Vitals Ranges:  Temp:  [96.6 °F (35.9 °C)] 96.6 °F (35.9 °C)  Heart Rate:  [115] 115  Resp:  [16] 16  BP: (103-135)/(46-67) 132/53    Physical Exam   Constitutional: He is oriented to person, place, and time. He appears well-developed and well-nourished.   Elderly, frail, chronically ill-appearing   HENT:   Head: Normocephalic and atraumatic.   Eyes: EOM are normal. Pupils are equal, round, and reactive to light.   Neck: Neck supple. No tracheal deviation present.   Cardiovascular: Normal rate and regular rhythm.  Exam reveals no gallop.    No murmur heard.  Pulmonary/Chest: Effort normal. No respiratory distress. He has no wheezes.   Decreased breath sounds with associated crackles at the left base otherwise lungs are clear   Abdominal: Soft. Bowel sounds are normal. He exhibits  no distension. There is no tenderness. There is no rebound and no guarding.   Genitourinary:   Genitourinary Comments: Flores catheter in place with leg bag.  He is in the process of receiving an enema.  Rectal exam performed by emergency room provider showed dark dual and no bright red blood per rectum.   Musculoskeletal: He exhibits no edema or tenderness.   Moderate, generalized muscle wasting   Neurological: He is alert and oriented to person, place, and time. No cranial nerve deficit.   Skin: Skin is warm and dry.   Thinning of the skin with scattered senile ecchymoses   Nursing note and vitals reviewed.      Results Review:    Results from last 7 days  Lab Units 02/02/18  1238   WBC 10*3/mm3 16.30*   HEMOGLOBIN g/dL 9.3*   HEMATOCRIT % 30.8*   PLATELETS 10*3/mm3 257       Results from last 7 days  Lab Units 02/02/18  1238   SODIUM mmol/L 138   POTASSIUM mmol/L 4.2   CHLORIDE mmol/L 95*   CO2 mmol/L 24.6   BUN mg/dL 67*   CREATININE mg/dL 2.40*   CALCIUM mg/dL 9.0   BILIRUBIN mg/dL 0.3   ALK PHOS U/L 89   ALT (SGPT) U/L 17   AST (SGOT) U/L 23   GLUCOSE mg/dL 216*     CT scan of the abdomen and pelvis:  1. Bilateral pulmonary nodules/masses most concerning for metastatic  disease. Partly imaged moderate-sized layering left pleural effusion.  Irregular wall thickening of the urinary bladder is consistent with  residual or recurrent neoplasm. There is moderate left  hydroureteronephrosis without significant interim change from imaging  performed in April 2017.  2. Moderate amount of stool throughout the colon with findings to  suggest rectal fecal impaction.     I reviewed the patient's new clinical results.  I reviewed the patient's new imaging results and agree with the interpretation.      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  His white blood cell count is elevated and given his complaints of change in urine quality I think it's possible that he has another urinary tract infection.  Previous urine culture from his prior admission showed Escherichia coli that was pansensitive.  He received Zosyn in the emergency room and we will continue this given his listed allergy to Keflex.  He appears to have tolerated the antibiotic given in the emergency room.  He is also receiving an enema and we will see if this gets his bowels going.  I will place him on an oral bowel regimen.  The ER provider reported very dark stool on rectal exam and the patient stated that he was on iron.  His hemoglobin looks better then on prior admission and is now up to 9.3 from 8.3.  I discussed the CT scan findings and new pulmonary nodules that are concerning for metastasis with the patient.  He states he would like to elect to not investigate this further at the moment and his main concern is getting his bowels going.  I also informed him of the left-sided pleural effusion and we discussed the possibility of thoracentesis in the near future if he developed respiratory symptoms or ongoing fever despite antibiotics.  I told him we could also send the fluid for cytology and explained what that meant.  He states he would also like to defer this for now.  He currently is very stable from a respiratory standpoint with no signs of decompensated heart failure.  Given his poor oral intake I will hold his diuretic upon admission and we will monitor volume status closely and recheck his renal function in the morning which appears to be near his baseline.  Creatinine at discharge last month was 2.2.  He states that he would like to be a full code.  I think he would benefit from further goals of care  discussion given the new pulmonary nodules once he is feeling better.    I discussed the patients findings and my recommendations with patient    Torres Avery MD  02/02/18  4:41 PM

## 2018-02-02 NOTE — ED PROVIDER NOTES
EMERGENCY DEPARTMENT ENCOUNTER    CHIEF COMPLAINT  Chief Complaint: constipation  History given by: patient  History limited by: N/A  Room Number: 39/39  PMD: Charles Rose MD   Urologist- Dr Rogel      HPI:  Pt is a 89 y.o. male who presents for constipation. He states that he usually has bowel movement daily or every other day but last had a bowel movement about 4 days ago. He reports that he has used fleet enema without significant relief. He has also had rectal pain, lower abd pain, and cloudy urine in chronic indwelling chung catheter (due to chronic urinary retention). He denies N/V/D, fevers, chills, chest pain, trouble breathing, and narcotic pain medication use. He is on 81mg ASA and no other blood thinner. Past Medical History of bladder cancer (currently not on treatment), chronic urinary retention, CAD, HTN, CKD, and diabetes.     Duration: started about 4 days ago  Timing: constant  Location: GI  Radiation: none  Quality: none  Intensity/Severity: moderate  Progression: unchanged  Associated Symptoms: rectal pain, lower abd pain, cloudy urine in chronic indwelling chung catheter  Aggravating Factors: none  Alleviating Factors: none  Previous Episodes: none mentioned  Treatment before arrival: Pt states that he has used fleet enema without significant relief of constipation.     PAST MEDICAL HISTORY  Active Ambulatory Problems     Diagnosis Date Noted   • Calculus of kidney 03/22/2016   • Hypertension 03/22/2016   • Hyperlipidemia 03/22/2016   • Gout 03/22/2016   • Type 2 diabetes mellitus with hyperglycemia 03/22/2016   • Atherosclerosis of coronary artery 03/22/2016   • Malignant neoplasm of urinary bladder 03/30/2016   • Hyperglycemia 04/03/2012   • Microscopic hematuria 02/13/2017   • Chronic kidney disease, stage 3 02/13/2017   • Iron deficiency anemia due to chronic blood loss 02/13/2017   • BPH (benign prostatic hyperplasia) 02/13/2017   • Colon polyps 02/16/2017   • Diverticulosis  02/16/2017   • Angiodysplasia of colon 02/16/2017   • Atrial fibrillation 02/16/2017   • 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   • Pulmonary HTN 03/20/2017   • Cardiomyopathy 03/20/2017   • Chronic coronary artery disease 04/20/2017   • Medicare annual wellness visit, initial 06/20/2017   • Testicle pain 12/19/2017   • Sepsis 01/02/2018   • Skin ulcer, chronic, limited to breakdown of skin 01/22/2018     Resolved Ambulatory Problems     Diagnosis Date Noted   • Effusion of elbow 03/22/2016   • Acute renal impairment 03/22/2016   • Near syncope 02/12/2017   • Acute posthemorrhagic anemia 02/13/2017   • Acute respiratory failure with hypoxia 02/28/2017   • Pleural effusion, bilateral 03/17/2017   • Gross hematuria 04/08/2017     Past Medical History:   Diagnosis Date   • Acute arthritis    • Acute renal insufficiency    • Anemia    • Angiodysplasia    • Atherosclerosis of native coronary artery of native heart without angina pectoris    • Atopic dermatitis    • BPH (benign prostatic hyperplasia)    • Cardiomyopathy 3/20/2017   • Carotid artery disease    • CKD (chronic kidney disease)    • Coronary artery disease    • Diabetes mellitus, type II    • Edema    • Elbow pain    • Gout    • Hematuria    • Hyperlipidemia    • Hypertension    • Joint effusion of elbow    • Lightheadedness    • Malignant neoplasm of bladder    • Near syncope    • Nephrolithiasis    • Neuralgia    • Psychogenic dysuria    • Pulmonary HTN 3/20/2017   • Renal insufficiency syndrome    • Skin avulsion    • Urinary retention    • UTI (urinary tract infection)        PAST SURGICAL HISTORY  Past Surgical History:   Procedure Laterality Date   • CAROTID ENDARTERECTOMY Bilateral    • CATARACT EXTRACTION Bilateral    • COLONOSCOPY      Complete   • COLONOSCOPY N/A 2/15/2017    Procedure: COLONOSCOPY to cecum and TI with cold and hot snare polypectomies;  Surgeon: Jae Rosado MD;  Location: [facility] ENDOSCOPY;  Service:    •  CORONARY ARTERY BYPASS GRAFT  1980'S & 2003    2 different surgeries BOTH SURGERY 3 VESSELS DONE   • ENDOSCOPY N/A 2/15/2017    Procedure: ESOPHAGOGASTRODUODENOSCOPY with biopsies;  Surgeon: Jae Rosado MD;  Location: [facility] ENDOSCOPY;  Service:    • GALLBLADDER SURGERY     • MA DRAIN LOWER LEG DEEP ABSC/HEMATOMA Left 7/14/2016    Procedure: LOWER EXTREMITY DEBRIDEMENT, EXCISION OF LT LEG SKIN TUMOR WITH FULL THICKNESS SKIN GRAFT RECONSTRUCTION;  Surgeon: Molina Jackson MD;  Location: [facility] OR;  Service: Plastics   • TRANSURETHRAL RESECTION OF BLADDER TUMOR N/A 4/10/2017    Procedure: CYSTOSCOPY WITH CLOT EVACUATION;  Surgeon: Vel Rogel MD;  Location: [facility] OR;  Service:        FAMILY HISTORY  Family History   Problem Relation Age of Onset   • Heart failure Mother      congestive   • Lung cancer Father    • Diabetes Brother      mellitus       SOCIAL HISTORY  Social History     Social History   • Marital status:      Spouse name: N/A   • Number of children: 4   • Years of education: N/A     Occupational History   • retired      Social History Main Topics   • Smoking status: Former Smoker     Packs/day: 0.50     Years: 30.00     Types: Cigarettes, Pipe, Cigars   • Smokeless tobacco: Never Used      Comment: 25 YR AGO   • Alcohol use No      Comment: caffeine use   • Drug use: No   • Sexual activity: Defer     Other Topics Concern   • Not on file     Social History Narrative         ALLERGIES  Iodine and Keflex [cephalexin]    REVIEW OF SYSTEMS  Review of Systems   Constitutional: Negative for chills and fever.   HENT: Negative for sore throat.    Eyes: Negative for visual disturbance.   Respiratory: Negative for cough and shortness of breath.    Cardiovascular: Negative for chest pain.   Gastrointestinal: Positive for abdominal pain (lower abd pain), constipation and rectal pain. Negative for diarrhea, nausea and vomiting.   Genitourinary: Negative for flank pain.        Cloudy  urine in chronic indwelling chung catheter   Musculoskeletal: Negative for back pain.   Skin: Negative for rash.   Neurological: Negative for dizziness and weakness.   Psychiatric/Behavioral: The patient is not nervous/anxious.        PHYSICAL EXAM  ED Triage Vitals   Temp Heart Rate Resp BP SpO2   02/02/18 1046 02/02/18 1045 02/02/18 1045 02/02/18 1046 02/02/18 1045   96.6 °F (35.9 °C) 115 16 135/67 97 % WNL       Physical Exam   Constitutional: He is oriented to person, place, and time. No distress.   Chronically ill appearing, frail appearing   HENT:   Head: Atraumatic.   Mouth/Throat: Mucous membranes are dry.   Eyes: EOM are normal. No scleral icterus.   Neck: Normal range of motion.   Cardiovascular: Normal rate, regular rhythm and normal heart sounds.    HR= 90s   Pulmonary/Chest: Effort normal and breath sounds normal. No respiratory distress. He has no wheezes.   Abdominal: Soft. Bowel sounds are normal. There is tenderness (lower abd tenderness). There is no rebound and no guarding.   Chronic indwelling chung catheter with cloudy urine in bag   Genitourinary: Rectal exam shows guaiac positive stool (heme positive black stool in rectal vault).   Genitourinary Comments: Hard black stool in rectal vault without impaction    Tech at pt's bedside during rectal exam   Musculoskeletal: Normal range of motion.   Neurological: He is alert and oriented to person, place, and time. He has normal motor skills and normal sensation.   Skin: Skin is warm and dry. There is pallor.   Psychiatric: Mood and affect normal.   Nursing note and vitals reviewed.      LAB RESULTS  Recent Results (from the past 24 hour(s))   Comprehensive Metabolic Panel    Collection Time: 02/02/18 12:38 PM   Result Value Ref Range    Glucose 216 (H) 65 - 99 mg/dL    BUN 67 (H) 8 - 23 mg/dL    Creatinine 2.40 (H) 0.76 - 1.27 mg/dL    Sodium 138 136 - 145 mmol/L    Potassium 4.2 3.5 - 5.2 mmol/L    Chloride 95 (L) 98 - 107 mmol/L    CO2 24.6 22.0 -  29.0 mmol/L    Calcium 9.0 8.6 - 10.5 mg/dL    Total Protein 7.3 6.0 - 8.5 g/dL    Albumin 2.90 (L) 3.50 - 5.20 g/dL    ALT (SGPT) 17 1 - 41 U/L    AST (SGOT) 23 1 - 40 U/L    Alkaline Phosphatase 89 39 - 117 U/L    Total Bilirubin 0.3 0.1 - 1.2 mg/dL    eGFR Non African Amer 26 (L) >60 mL/min/1.73    Globulin 4.4 gm/dL    A/G Ratio 0.7 g/dL    BUN/Creatinine Ratio 27.9 (H) 7.0 - 25.0    Anion Gap 18.4 mmol/L   Protime-INR    Collection Time: 02/02/18 12:38 PM   Result Value Ref Range    Protime 16.7 (H) 11.7 - 14.2 Seconds    INR 1.40 (H) 0.90 - 1.10   Type & Screen    Collection Time: 02/02/18 12:38 PM   Result Value Ref Range    ABO Type A     RH type Negative     Antibody Screen Negative    CBC Auto Differential    Collection Time: 02/02/18 12:38 PM   Result Value Ref Range    WBC 16.30 (H) 4.50 - 10.70 10*3/mm3    RBC 3.68 (L) 4.60 - 6.00 10*6/mm3    Hemoglobin 9.3 (L) 13.7 - 17.6 g/dL    Hematocrit 30.8 (L) 40.4 - 52.2 %    MCV 83.7 79.8 - 96.2 fL    MCH 25.3 (L) 27.0 - 32.7 pg    MCHC 30.2 (L) 32.6 - 36.4 g/dL    RDW 17.9 (H) 11.5 - 14.5 %    RDW-SD 55.1 (H) 37.0 - 54.0 fl    MPV 10.1 6.0 - 12.0 fL    Platelets 257 140 - 500 10*3/mm3    Neutrophil % 89.9 (H) 42.7 - 76.0 %    Lymphocyte % 4.8 (L) 19.6 - 45.3 %    Monocyte % 5.0 5.0 - 12.0 %    Eosinophil % 0.0 (L) 0.3 - 6.2 %    Basophil % 0.1 0.0 - 1.5 %    Immature Grans % 0.2 0.0 - 0.5 %    Neutrophils, Absolute 14.65 (H) 1.90 - 8.10 10*3/mm3    Lymphocytes, Absolute 0.79 (L) 0.90 - 4.80 10*3/mm3    Monocytes, Absolute 0.81 0.20 - 1.20 10*3/mm3    Eosinophils, Absolute 0.00 0.00 - 0.70 10*3/mm3    Basophils, Absolute 0.01 0.00 - 0.20 10*3/mm3    Immature Grans, Absolute 0.04 (H) 0.00 - 0.03 10*3/mm3   Antibody Identification    Collection Time: 02/02/18 12:38 PM   Result Value Ref Range    Anti-D ANTI-D    Prepare RBC, 2 Units    Collection Time: 02/02/18  2:06 PM   Result Value Ref Range    Product Code F2839V05     Unit Number [id number]     UNIT   ABO A     UNIT  RH NEG     CROSSMATCH 1 INTERPRETATION Compatible     Dispense Status XM     Blood Type ANEG     Blood Expiration Date 201803072359     Blood Type Barcode 0600     Product Code T4906I07     Unit Number [id number]     UNIT  ABO A     UNIT  RH NEG     CROSSMATCH 1 INTERPRETATION Compatible     Dispense Status XM     Blood Type ANEG     Blood Expiration Date 201803072359     Blood Type Barcode 0600    Urinalysis With / Culture If Indicated - Urine, Catheter    Collection Time: 02/02/18  2:48 PM   Result Value Ref Range    Color, UA Yellow Yellow, Straw    Appearance, UA Cloudy (A) Clear    pH, UA 5.5 5.0 - 8.0    Specific Gravity, UA 1.011 1.005 - 1.030    Glucose, UA Negative Negative    Ketones, UA Negative Negative    Bilirubin, UA Negative Negative    Blood, UA Large (3+) (A) Negative    Protein,  mg/dL (2+) (A) Negative    Leuk Esterase, UA Large (3+) (A) Negative    Nitrite, UA Positive (A) Negative    Urobilinogen, UA 1.0 E.U./dL 0.2 - 1.0 E.U./dL   Urinalysis, Microscopic Only - Urine, Clean Catch    Collection Time: 02/02/18  2:48 PM   Result Value Ref Range    RBC, UA Unable to determine due to loaded field (A) None Seen, 0-2 /HPF    WBC, UA Too Numerous to Count (A) None Seen, 0-2 /HPF    Bacteria, UA Unable to determine due to loaded field (A) None Seen /HPF    Squamous Epithelial Cells, UA Unable to determine due to loaded field (A) None Seen, 0-2 /HPF    Hyaline Casts, UA Unable to determine due to loaded field None Seen /LPF    Methodology Manual Light Microscopy        I ordered the above labs and reviewed the results      RADIOLOGY         CT Abdomen Pelvis Without Contrast (Preliminary result) Result time: 02/02/18 13:39:46     Preliminary result by Interface, infibond Results [employer] [place] (02/02/18 13:39:46)     Impression:     1. Bilateral pulmonary nodules/masses most concerning for metastatic  disease. Partly imaged moderate-sized layering left pleural effusion.  Irregular  wall thickening of the urinary bladder is consistent with  residual or recurrent neoplasm. There is moderate left  hydroureteronephrosis without significant interim change from imaging  performed in April 2017.  2. Moderate amount of stool throughout the colon with findings to  suggest rectal fecal impaction.     These findings were discussed with Sarah Mahan by telephone at  1:37 PM on 02/02/2018.     Radiation dose reduction techniques were utilized, including automated  exposure control and exposure modulation based on body size.           Narrative:     CT ABDOMEN AND PELVIS WITHOUT CONTRAST     HISTORY: Lower abdominal pain and black stools. Hematuria.     TECHNIQUE: Axial CT images of the abdomen and pelvis were obtained  without administration of intravenous contrast. The patient was not  given oral contrast. Coronal and sagittal reformats were obtained.     COMPARISON: 04/10/2017.     FINDINGS: The visualized lower lung fields demonstrate scattered large  pulmonary nodules/masses. The largest of these within the medial right  lower lobe measures up to 3.3 cm. There is a moderate-sized partly  imaged left pleural effusion present.     Irregular wall thickening in the urinary bladder is consistent with  residual/recurrent neoplasm. The prostate gland is enlarged and indents  the bladder base. There is persistent moderate left  hydroureteronephrosis extending up to the UV junction.     Stable peripherally calcified lesion within the spleen. The spleen  remains mildly enlarged. The liver is unremarkable on noncontrast phase.  The gallbladder is surgically absent. The pancreas is normal. Small  right adrenal gland nodule is unchanged. Partly exophytic intermediate  density lesion arising from the superior pole of the right kidney  measuring up to 1.3 cm unchanged from prior imaging. No right-sided  hydronephrosis or hydroureter is seen. Hyperdense stool is present  within the rectosigmoid colon with mild  fecal impaction within the  rectum.     Moderate to advanced atherosclerotic disease is seen within the  abdominal aorta and its branches. Subcentimeter retroperitoneal lymph  nodes are reidentified without significant interim change from prior  imaging. No ascites is present.          I ordered the above noted radiological studies and reviewed the images on the PACS system.  Spoke with Dr. Rasmussen (radiologist) regarding CT scan results        MEDICAL RECORD REVIEW  Pt was admitted from 1/2/18 to 1/6/18 for E Coli UTI, sepsis, anterior STEMI, VENKATA on CKD, and anemia.     Hospital course note from 1/2/18 to 1/6/18 admission-   Patient was admitted to the ICU with shock he has a chronic indwelling Flores catheter.  Greater than 10 to the sixth Escherichia coli he also had evidence for an ST elevation anterior MI.  He has chronic kidney disease stage IV and chronic urinary retention with chronic indwelling Flores catheter.  He was felt to be very high risk for cardiac catheterization his ejection fraction was unchanged and he actually recovered fairly well from his MI when he came in he said some complete heart block that resolved as well as been in sinus rhythm since.  Cardiology felt that it was best to pursue  a conservative course with medical management only which is been done cardiology has cleared patient for discharge as his nephrology he has follow up with both Dr. Carl Knowles of cardiology in 3-4 weeks and with Dr. Warren on 1/18/70 he is to keep both of those he is to follow-up with his primary care physician in about 1 week.  Patient just developed some diarrhea this may be related to the antibiotic either directly antibiotic or the risk of C. difficile colitis we will send a stool for C. difficile toxin if that is negative we'll go ahead and discharge him home if that is positive then we will keep him for a couple of days and give him I treated on course before he goes.  I told him if he continues to  have diarrhea and is not C. difficile goes home he can use over-the-counter Imodium as needed for his diarrhea.  For reference purposes creatinine is 2.21 today and it's been stable at this level for a couple of days.  He has had some mildly elevated transaminases thought related to shock and MI they have been slowly improving his ALT is 231 and his AST is 116 do recommend he have those followed up in a week with his primary care physician.  His uric acid is 12.3.  His hemoglobin is 8.3 and has been stable.    3 weeks ago, WBC count was 12.06 and hgb was 8.3. 11 days ago, BUN was 47 and creatinine was 2.02.         PROGRESS AND CONSULTS  12:21 PM- On rectal exam, there is heme positive black stool. Ordered IV fluids for hydration. Ordered CT abd/pel, blood work, PT with INR, UA, and type and screen for further evaluation.   12:46 PM- Reviewed pt's history and workup with Dr. Mckoy.  At bedside evaluation, they agree with the plan of care.  1:33 PM- Obtained CT abd/pel results-> shows bilateral pulmonary nodules/masses most concerning for metastatic disease, moderate sized left pleural effusion, irregular wall thickening of the urinary bladder consistent with neoplasm, moderate amount of stool throughout the colon suggesting rectal fecal impaction.   2:30 PM- Reviewed labs-> 3 weeks ago, WBC count was 12.06 and hgb was 8.3. 11 days ago, BUN was 47 and creatinine was 2.02. Today, WBC count is elevated 16.3, hgb has improved to 9.3, BUN is elevated at 67, and creatinine is elevated at 2.4.   2:41 PM- Family is now at pt's bedside. Rechecked pt. He is resting comfortably and is in no acute distress. Discussed with pt and family about all pertinent results obtained so far including elevated WBC count, elevated BUN/elevated creatinine compared to previous, improved hemoglobin of 9.3 compared to previous, and about CT abd/pel findings (bilateral pulmonary nodules/masses concerning for metastatic disease, urinary bladder  neoplasm, moderate amount of stool throughout the colon suggesting constipation). Informed them that UA is still pending and that pt will receive enema to treat constipation. Pt and family verbalize understanding and agreement.   2:54 PM- Ordered milk and molasses enema for constipation.  3:37 PM- Ordered zosyn for UTI. Sent call out to [facility] for admission.   3:46 PM- Rechecked pt. O2 sat is 100% on room air. HR is 84. BP is 132/53. Discussed with pt and family about heme positive black stool on rectal exam and UTI indicated on UA. Discussed pt admission for further care and observation. Pt and family verbalize understanding and agreement with plan. Pt states that he takes iron supplement.  4:04 PM- Discussed case with Dr Avery ([facility] hospitalist)  Reviewed history, exam, results and treatments.  Discussed concerns and plan of care. Dr Avery accepts pt to be admitted to telemetry.  6:27 PM- Per RN, pt had minimal stool results with administration of enema.         ADMISSION    Discussed treatment plan and reason for admission with pt/family and admitting physician.  Pt/family voiced understanding of the plan for admission for further testing/treatment as needed.      DIAGNOSIS  Final diagnoses:   Acute UTI   Constipation, unspecified constipation type   Acute on chronic renal insufficiency   Gastrointestinal hemorrhage, unspecified gastrointestinal hemorrhage type   Pulmonary nodules   Chronic anemia           COURSE & MEDICAL DECISION MAKING  Pertinent Labs and Imaging studies that were ordered and reviewed are noted above.  Results were reviewed/discussed with the patient and family and they were also made aware of online assess.   Pt and family also made aware that some labs, such as cultures, will not be resulted during ER visit and follow up with PMD is necessary.     MEDICATIONS GIVEN IN ER  Medications   sodium chloride 0.9 % flush 10 mL (not administered)   piperacillin-tazobactam (ZOSYN) 3.375 g in  "iso-osmotic dextrose 50 ml (premix) (not administered)   sodium chloride 0.9 % bolus 1,000 mL (0 mL Intravenous Stopped 2/2/18 1448)       /53  Pulse 115  Temp 96.6 °F (35.9 °C) (Tympanic)   Resp 16  Ht 177.8 cm (70\")  Wt 66.7 kg (147 lb)  SpO2 99%  BMI 21.09 kg/m2      I personally reviewed the past medical history, past surgical history, social history, family history, current and allergies as they appear in this chart.  The scribe's note accurately reflects the work and decisions made by me.     Documentation assistance provided by carmen Dover for SURI Arroyo on 2/2/2018 at 4:09 PM. Information recorded by the scribe was done at my direction and has been verified and validated by me.       Daiana Dover  02/02/18 [id number]       CALDERON Disla  02/02/18 1902    "

## 2018-02-03 NOTE — PROGRESS NOTES
"  Name: Imtiaz Jones  ADMIT: 2018   Age/Sex: 89 y.o.male LOS:  LOS: 1 day    :    1928     ROOM: [facility]   MRN:    [id number]    PCP:    Charles Rose MD     Subjective   Doing ok. No complaints currently. Had BM yesterday after enema.       Objective   Vital Signs  Temp:  [96.6 °F (35.9 °C)-99.3 °F (37.4 °C)] 97.9 °F (36.6 °C)  Heart Rate:  [] 75  Resp:  [16-18] 16  BP: (103-135)/(35-74) 116/45  Body mass index is 21.09 kg/(m^2).    Objective:  General Appearance:  Comfortable and well-appearing.    Vital signs: (most recent): Blood pressure 116/45, pulse 75, temperature 97.9 °F (36.6 °C), temperature source Oral, resp. rate 16, height 177.8 cm (70\"), weight 66.7 kg (147 lb), SpO2 94 %.  Vital signs are normal.    HEENT: Normal HEENT exam.    Lungs:  Normal effort.  Breath sounds clear to auscultation.    Heart: Normal rate.  Regular rhythm.    Abdomen: Abdomen is soft and non-distended.  Bowel sounds are normal.   There is no abdominal tenderness.     Extremities: Normal range of motion.  There is no dependent edema.    Neurological: Patient is alert and oriented to person, place and time.    Skin:  Warm and dry.  No rash.             Results Review:       I reviewed the patient's new clinical results.    Results from last 7 days  Lab Units 18  1238   WBC 10*3/mm3 16.30*   HEMOGLOBIN g/dL 9.3*   PLATELETS 10*3/mm3 257       Results from last 7 days  Lab Units 18  1238   SODIUM mmol/L 138   POTASSIUM mmol/L 4.2   CHLORIDE mmol/L 95*   CO2 mmol/L 24.6   BUN mg/dL 67*   CREATININE mg/dL 2.40*   GLUCOSE mg/dL 216*   Estimated Creatinine Clearance: 19.7 mL/min (by C-G formula based on Cr of 2.4).    Results from last 7 days  Lab Units 18  1238   CALCIUM mg/dL 9.0   ALBUMIN g/dL 2.90*       RADIOLOGY  2/3/2018  Pending      aspirin 81 mg Oral Daily   atorvastatin 40 mg Oral Daily   bisacodyl 10 mg Rectal Daily   heparin (porcine) 5,000 Units Subcutaneous Q12H   insulin aspart " 0-7 Units Subcutaneous 4x Daily With Meals & Nightly   isosorbide mononitrate 30 mg Oral Q24H   piperacillin-tazobactam 3.375 g Intravenous Q12H   polyethylene glycol 17 g Oral Daily   sennosides-docusate sodium 2 tablet Oral BID       Pharmacy to Dose Zosyn    Diet Regular; Cardiac, Consistent Carbohydrate      Assessment/Plan   Assessment:   Principal Problem:    Acute UTI  Active Problems:    Hypertension    Type 2 diabetes mellitus with hyperglycemia    Atherosclerosis of coronary artery    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 UTI  - sepsis present on admission  - cont zosyn for now, de-escalate once culture comes back  - will always be at risk for these with indwelling chung.  - WBC pending this am    2. Fecal impaction  - cleared last night  - will start senna-s in addition to his miralax and daily suppository  - may need to try another enema    3. Pulmonary nodules/masses  - states he does not want any further workup for these    4. Chronic combined HF  - looks euvolemic currently  - torsemide is currently being held  - will plan to restart tomorrow if taking in good po today. Low salt diet  - Not on ACEi due to renal function. Needs to be on low dose BBlocker    5. CKD4  - Cr about at baseline, pending this am  - No IVF if Cr stable         Disposition  TBD.      Ja Escobedo MD  [facility]  02/03/18  7:45 AM

## 2018-02-03 NOTE — PLAN OF CARE
Problem: Patient Care Overview (Adult)  Goal: Plan of Care Review  Outcome: Ongoing (interventions implemented as appropriate)   02/03/18 1703   Coping/Psychosocial Response Interventions   Plan Of Care Reviewed With patient   Patient Care Overview   Progress no change   Outcome Evaluation   Outcome Summary/Follow up Plan pt resting most of day, chronic chung, mepilex changed on PU 2, no c/o pain monitoring     Goal: Adult Individualization and Mutuality  Outcome: Ongoing (interventions implemented as appropriate)    Goal: Discharge Needs Assessment  Outcome: Ongoing (interventions implemented as appropriate)      Problem: Infection, Risk/Actual (Adult)  Goal: Identify Related Risk Factors and Signs and Symptoms  Outcome: Ongoing (interventions implemented as appropriate)    Goal: Infection Prevention/Resolution  Outcome: Ongoing (interventions implemented as appropriate)      Problem: Fall Risk (Adult)  Goal: Identify Related Risk Factors and Signs and Symptoms  Outcome: Ongoing (interventions implemented as appropriate)    Goal: Absence of Falls  Outcome: Ongoing (interventions implemented as appropriate)      Problem: Wound, Traumatic, Nonburn (Adult)  Goal: Signs and Symptoms of Listed Potential Problems Will be Absent or Manageable (Wound, Traumatic, Nonburn)  Outcome: Ongoing (interventions implemented as appropriate)      Problem: Skin Integrity Impairment, Risk/Actual (Adult)  Goal: Identify Related Risk Factors and Signs and Symptoms  Outcome: Ongoing (interventions implemented as appropriate)    Goal: Skin Integrity/Wound Healing  Outcome: Ongoing (interventions implemented as appropriate)      Problem: Pain, Chronic (Adult)  Goal: Identify Related Risk Factors and Signs and Symptoms  Outcome: Ongoing (interventions implemented as appropriate)    Goal: Acceptable Pain Control/Comfort Level  Outcome: Ongoing (interventions implemented as appropriate)

## 2018-02-03 NOTE — NURSING NOTE
Patient stated that home health changed is urine catheter Wednesday 1/31. md aware no new orders to change catether at this time. Continue to monitor

## 2018-02-03 NOTE — PLAN OF CARE
Problem: Patient Care Overview (Adult)  Goal: Plan of Care Review   02/03/18 1050   Coping/Psychosocial Response Interventions   Plan Of Care Reviewed With patient   Outcome Evaluation   Outcome Summary/Follow up Plan Pt presents from home w/acute UTI, new pulmonary nodules w/history of bladder cancer; recent admission 1 month prior for acute MI. Upon exam, pt without complaint; demonstrates generalized weakness and mild balance impairment. Pt reports he is independent and use walker occasionally at home; currently requiring SBA to CGA with mobility. Pt would benefit from continued PT to address impairments and assist w/return to PLOF. Anticipate pt will be able to return home w/o difficulty; may also benefit from HH PT if further strengthening and balance training is desired.        Problem: Inpatient Physical Therapy  Goal: Bed Mobility Goal LTG- PT   02/03/18 1050   Bed Mobility PT LTG   Bed Mobility PT LTG, Date Established 02/03/18   Bed Mobility PT LTG, Time to Achieve 1 wk   Bed Mobility PT LTG, Activity Type all bed mobility   Bed Mobility PT LTG, [place] Level conditional independence     Goal: Transfer Training Goal 1 LTG- PT   02/03/18 1050   Transfer Training PT LTG   Transfer Training PT LTG, Date Established 02/03/18   Transfer Training PT LTG, Time to Achieve 1 wk   Transfer Training PT LTG, Activity Type all transfers   Transfer Training PT LTG, [place] Level supervision required   Transfer Training PT LTG, Assist Device walker, rolling     Goal: Gait Training Goal LTG- PT   02/03/18 1050   Gait Training PT LTG   Gait Training Goal PT LTG, Date Established 02/03/18   Gait Training Goal PT LTG, Time to Achieve 1 wk   Gait Training Goal PT LTG, [place] Level supervision required   Gait Training Goal PT LTG, Assist Device walker, rolling   Gait Training Goal PT LTG, Distance to Achieve 150

## 2018-02-03 NOTE — THERAPY EVALUATION
Acute Care - Physical Therapy Initial Evaluation  [facility]     Patient Name: Imtiaz Jones  : 1928  MRN: [id number]  Today's Date: 2/3/2018   Onset of Illness/Injury or Date of Surgery Date: 18     Referring Physician: Gregg      Admit Date: 2018     Visit Dx:    ICD-10-CM ICD-9-CM   1. Acute UTI N39.0 599.0   2. Constipation, unspecified constipation type K59.00 564.00   3. Acute on chronic renal insufficiency N28.9 593.9    N18.9 585.9   4. Gastrointestinal hemorrhage, unspecified gastrointestinal hemorrhage type K92.2 578.9   5. Pulmonary nodules R91.8 793.19   6. Chronic anemia D64.9 285.9   7. 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   • Hyperglycemia   • Microscopic hematuria   • Chronic kidney disease, stage 3   • Iron deficiency anemia due to chronic blood loss   • BPH (benign prostatic hyperplasia)   • Colon polyps   • Diverticulosis   • Angiodysplasia of colon   • Atrial fibrillation   • Chronic combined systolic and diastolic heart failure   • Pleural effusion   • Pulmonary HTN   • Cardiomyopathy   • Chronic coronary artery disease   • Medicare annual wellness visit, initial   • Testicle pain   • Sepsis   • Skin ulcer, chronic, limited to breakdown of skin   • Acute UTI   • Constipation   • Chronic urinary retention     Past Medical History:   Diagnosis Date   • Acute arthritis    • Acute renal insufficiency    • Anemia    • Angiodysplasia     of colon   • Atherosclerosis of native coronary artery of native heart without angina pectoris    • Atopic dermatitis    • BPH (benign prostatic hyperplasia)    • Cardiomyopathy 3/20/2017   • Carotid artery disease     HAD ABBEY ENDARTERECTOMY   • CKD (chronic kidney disease)    • Coronary artery disease    •  Diabetes mellitus, type II     TYPE 2   • Edema    • Elbow pain    • Gout    • Hematuria     HISTORY REASON WHY SURGERY WAS CANCELED 2 WEEKS AGO   • Hyperlipidemia    • Hypertension     OFF MEDICATION FOR NOW.    • Joint effusion of elbow    • Lightheadedness    • Malignant neoplasm of bladder     ALSO CANCER ON LT LEG   • Near syncope    • Nephrolithiasis    • Neuralgia    • Psychogenic dysuria    • Pulmonary HTN 3/20/2017   • Renal insufficiency syndrome    • Skin avulsion    • Urinary retention    • UTI (urinary tract infection)      Past Surgical History:   Procedure Laterality Date   • CAROTID ENDARTERECTOMY Bilateral    • CATARACT EXTRACTION Bilateral    • COLONOSCOPY      Complete   • COLONOSCOPY N/A 2/15/2017    Procedure: COLONOSCOPY to cecum and TI with cold and hot snare polypectomies;  Surgeon: Jae Rosado MD;  Location: [facility] ENDOSCOPY;  Service:    • CORONARY ARTERY BYPASS GRAFT  1980'S & 2003    2 different surgeries BOTH SURGERY 3 VESSELS DONE   • ENDOSCOPY N/A 2/15/2017    Procedure: ESOPHAGOGASTRODUODENOSCOPY with biopsies;  Surgeon: Jae Rosado MD;  Location: [facility] ENDOSCOPY;  Service:    • GALLBLADDER SURGERY     • OH DRAIN LOWER LEG DEEP ABSC/HEMATOMA Left 7/14/2016    Procedure: LOWER EXTREMITY DEBRIDEMENT, EXCISION OF LT LEG SKIN TUMOR WITH FULL THICKNESS SKIN GRAFT RECONSTRUCTION;  Surgeon: Molina Jackson MD;  Location: [facility] OR;  Service: Plastics   • TRANSURETHRAL RESECTION OF BLADDER TUMOR N/A 4/10/2017    Procedure: CYSTOSCOPY WITH CLOT EVACUATION;  Surgeon: Vel Rogel MD;  Location: [facility] OR;  Service:           PT ASSESSMENT (last 72 hours)      PT Evaluation       02/03/18 1020 02/02/18 2000    Rehab Evaluation    Document Type evaluation  -EE     Subjective Information agree to therapy;no complaints  -EE     Patient Effort, Rehab Treatment good  -EE     Symptoms Noted During/After Treatment none  -EE     General Information    Onset of  Illness/Injury or Date of Surgery Date 02/02/18  -EE     Referring Physician Gregg  -EE     General Observations Pt supine in bed in no acute distress  -EE     Pertinent History Of Current Problem Admitted w/acute UTI, new pulm nodules w/history of bladder cancer. Recent admission 1 month ago with acute MI.  -EE     Precautions/Limitations fall precautions  -EE     Prior Level of Function independent:;all household mobility;community mobility  -EE     Equipment Currently Used at Home walker, rolling   states he uses walker when needed  -EE walker, standard  -NM    Plans/Goals Discussed With patient;agreed upon  -EE     Barriers to Rehab none identified  -EE     Living Environment    Lives With spouse  -EE spouse  -NM    Living Arrangements house  -EE house  -NM    Home Accessibility stairs to enter home  -EE no concerns  -NM    Number of Stairs to Enter Home 3  -EE     Stair Railings at Home outside, present at both sides  -EE outside, present at both sides  -NM    Type of Financial/Environmental Concern  none  -NM    Transportation Available  car;family or friend will provide  -NM    Clinical Impression    Patient/Family Goals Statement Go home  -EE     Criteria for Skilled Therapeutic Interventions Met yes;treatment indicated  -EE     Pathology/Pathophysiology Noted (Describe Specifically for Each System) musculoskeletal  -EE     Impairments Found (describe specific impairments) gait, locomotion, and balance  -EE     Rehab Potential good, to achieve stated therapy goals  -EE     Pain Assessment    Pain Assessment No/denies pain  -EE     Cognitive Assessment/Intervention    Current Cognitive/Communication Assessment functional  -EE     Orientation Status oriented x 4  -EE     Follows Commands/Answers Questions 100% of the time  -EE     Personal Safety WNL/WFL  -EE     Personal Safety Interventions fall prevention program maintained;gait belt;muscle strengthening facilitated;nonskid shoes/slippers when out of  bed;supervised activity  -EE     ROM (Range of Motion)    General ROM no range of motion deficits identified  -EE     General ROM Detail B LEs grossly WFL  -EE     MMT (Manual Muscle Testing)    General MMT Assessment lower extremity strength deficits identified  -EE     General MMT Assessment Detail generalized weakness observed in B LEs with functional mobility  -EE     Bed Mobility, Assessment/Treatment    Bed Mobility, Assistive Device bed rails;head of bed elevated  -EE     Bed Mobility, Scoot/Bridge, [place] conditional independence  -EE     Bed Mob, Supine to Sit, [place] supervision required  -EE     Bed Mob, Sit to Supine, [place] supervision required  -EE     Transfer Assessment/Treatment    Transfers, Sit-Stand [place] stand by assist  -EE     Transfers, Stand-Sit [place] stand by assist  -EE     Transfers, Sit-Stand-Sit, Assist Device rolling walker  -EE     Transfer, Impairments strength decreased  -EE     Gait Assessment/Treatment    Gait, [place] Level contact guard assist;verbal cues required  -EE     Gait, Assistive Device rolling walker  -EE     Gait, Distance (Feet) 150  -EE     Gait, Gait Deviations forward flexed posture;maria isabel decreased;step length decreased  -EE     Gait, Safety Issues step length decreased  -EE     Gait, Impairments strength decreased;impaired balance  -EE     Gait, Comment cues for upright posture  -EE     Therapy Exercises    Bilateral Lower Extremities AROM:;5 reps;ankle pumps/circles;LAQ  -EE     Positioning and Restraints    Pre-Treatment Position in bed  -EE     Post Treatment Position bed  -EE     In Bed supine;call light within reach;encouraged to call for assist;exit alarm on;notified nsg  -EE       User Key  (r) = Recorded By, (t) = Taken By, (c) = Cosigned By    Initials Name Provider Type    EE Isabel Santiago, PT Physical Therapist    NM Steffany Sebastian, RN Registered Nurse          Physical Therapy Education     Title: PT OT SLP  Therapies (Active)     Topic: Physical Therapy (Active)     Point: Mobility training (Done)    Learning Progress Summary    Learner Readiness Method Response Comment Documented by Status   Patient Acceptance E VU,NR  EE 02/03/18 1050 Done               Point: Home exercise program (Done)    Learning Progress Summary    Learner Readiness Method Response Comment Documented by Status   Patient Acceptance E VU,NR  EE 02/03/18 1050 Done               Point: Body mechanics (Done)    Learning Progress Summary    Learner Readiness Method Response Comment Documented by Status   Patient Acceptance E VU,NR  EE 02/03/18 1050 Done                      User Key     Initials Effective Dates Name Provider Type Discipline     12/01/15 -  Isabel Santiago, PT Physical Therapist PT                PT Recommendation and Plan  Anticipated Discharge Disposition: home with assist, home with home health  Planned Therapy Interventions: balance training, bed mobility training, gait training, home exercise program, patient/family education, stair training, strengthening, transfer training  PT Frequency: daily  Plan of Care Review  Plan Of Care Reviewed With: patient  Outcome Summary/Follow up Plan: Pt presents from home w/acute UTI, new pulmonary nodules w/history of bladder cancer; recent admission 1 month prior for acute MI. Upon exam, pt without complaint; demonstrates generalized weakness and mild balance impairment. Pt reports he is independent and use walker occasionally at home; currently requiring SBA to CGA with mobility. Pt would benefit from continued PT to address impairments and assist w/return to PLOF. Anticipate pt will be able to return home w/o difficulty; may also benefit from  PT if further strengthening and balance training is desired.           IP PT Goals       02/03/18 1050          Bed Mobility PT LTG    Bed Mobility PT LTG, Date Established 02/03/18  -EE      Bed Mobility PT LTG, Time to Achieve 1 wk  -EE      Bed  Mobility PT LTG, Activity Type all bed mobility  -EE      Bed Mobility PT LTG, [place] Level conditional independence  -EE      Transfer Training PT LTG    Transfer Training PT LTG, Date Established 02/03/18  -EE      Transfer Training PT LTG, Time to Achieve 1 wk  -EE      Transfer Training PT LTG, Activity Type all transfers  -EE      Transfer Training PT LTG, [place] Level supervision required  -EE      Transfer Training PT LTG, Assist Device walker, rolling  -EE      Gait Training PT LTG    Gait Training Goal PT LTG, Date Established 02/03/18  -EE      Gait Training Goal PT LTG, Time to Achieve 1 wk  -EE      Gait Training Goal PT LTG, [place] Level supervision required  -EE      Gait Training Goal PT LTG, Assist Device walker, rolling  -EE      Gait Training Goal PT LTG, Distance to Achieve 150  -EE        User Key  (r) = Recorded By, (t) = Taken By, (c) = Cosigned By    Initials Name Provider Type    EE Isabel Santiago PT Physical Therapist                Outcome Measures       02/03/18 1000          How much help from another person do you currently need...    Turning from your back to your side while in flat bed without using bedrails? 4  -EE      Moving from lying on back to sitting on the side of a flat bed without bedrails? 4  -EE      Moving to and from a bed to a chair (including a wheelchair)? 3  -EE      Standing up from a chair using your arms (e.g., wheelchair, bedside chair)? 3  -EE      Climbing 3-5 steps with a railing? 3  -EE      To walk in hospital room? 3  -EE      AM-PAC 6 Clicks Score 20  -EE      Functional Assessment    Outcome Measure Options AM-PAC 6 Clicks Basic Mobility (PT)  -EE        User Key  (r) = Recorded By, (t) = Taken By, (c) = Cosigned By    Initials Name Provider Type    EE Isabel Santiago PT Physical Therapist           Time Calculation:         PT Charges       02/03/18 1052          Time Calculation    Start Time 1020  -EE      Stop Time 1035  -EE      Time  Calculation (min) 15 min  -EE      PT Received On 02/03/18  -EE      PT - Next Appointment 02/05/18  -EE      PT Goal Re-Cert Due Date 02/10/18  -EE        User Key  (r) = Recorded By, (t) = Taken By, (c) = Cosigned By    Initials Name Provider Type    EE Isabel Santiago PT Physical Therapist          Therapy Charges for Today     Code Description Service Date Service Provider Modifiers Qty    29254214869 HC PT EVAL MOD COMPLEXITY 2 2/3/2018 Isabel Santiago, PT GP 1    58424977830 HC PT THER PROC EA 15 MIN 2/3/2018 Isabel Santiago, PT GP 1          PT G-Codes  Outcome Measure Options: AM-PAC 6 Clicks Basic Mobility (PT)      Isabel Santiago PT  2/3/2018

## 2018-02-03 NOTE — PLAN OF CARE
Problem: Patient Care Overview (Adult)  Goal: Plan of Care Review  Outcome: Ongoing (interventions implemented as appropriate)   02/03/18 0415   Coping/Psychosocial Response Interventions   Plan Of Care Reviewed With patient   Patient Care Overview   Progress no change   Outcome Evaluation   Outcome Summary/Follow up Plan Pt came up from ER during shift change for constipation d/t fecal impaction and UTI. Has chronic catheter d/t hx of bladder cancer. Pt is alert and oriented x4. Received molasses enema in ER with little results. Plan of care includes miralax from here on out. Pt requests pain meds and sleeping pill. Given tramadol and benadryl. Given zosyn in ER and one dose on floor.     Goal: Adult Individualization and Mutuality  Outcome: Ongoing (interventions implemented as appropriate)    Goal: Discharge Needs Assessment  Outcome: Ongoing (interventions implemented as appropriate)      Problem: Infection, Risk/Actual (Adult)  Goal: Identify Related Risk Factors and Signs and Symptoms  Outcome: Ongoing (interventions implemented as appropriate)    Goal: Infection Prevention/Resolution  Outcome: Ongoing (interventions implemented as appropriate)      Problem: Fall Risk (Adult)  Goal: Identify Related Risk Factors and Signs and Symptoms  Outcome: Ongoing (interventions implemented as appropriate)    Goal: Absence of Falls  Outcome: Ongoing (interventions implemented as appropriate)      Problem: Wound, Traumatic, Nonburn (Adult)  Goal: Signs and Symptoms of Listed Potential Problems Will be Absent or Manageable (Wound, Traumatic, Nonburn)  Outcome: Ongoing (interventions implemented as appropriate)      Problem: Skin Integrity Impairment, Risk/Actual (Adult)  Goal: Identify Related Risk Factors and Signs and Symptoms  Outcome: Ongoing (interventions implemented as appropriate)    Goal: Skin Integrity/Wound Healing  Outcome: Ongoing (interventions implemented as appropriate)      Problem: Pain, Chronic  (Adult)  Goal: Identify Related Risk Factors and Signs and Symptoms  Outcome: Ongoing (interventions implemented as appropriate)    Goal: Acceptable Pain Control/Comfort Level  Outcome: Ongoing (interventions implemented as appropriate)

## 2018-02-03 NOTE — PLAN OF CARE
Problem: Patient Care Overview (Adult)  Goal: Plan of Care Review   02/03/18 0504   Coping/Psychosocial Response Interventions   Plan Of Care Reviewed With patient   Patient Care Overview   Progress no change   Outcome Evaluation   Outcome Summary/Follow up Plan Pt has stage 2 pressure ulcer on lt coccyx

## 2018-02-04 NOTE — PROGRESS NOTES
"  Name: Imtiaz Jones  ADMIT: 2018   Age/Sex: 89 y.o.male LOS:  LOS: 2 days    :    1928     ROOM: [facility]   MRN:    [id number]    PCP:    Charles Rose MD     Subjective   Doing well. Had BM last night. No pain.     Objective   Vital Signs  Temp:  [97.4 °F (36.3 °C)-97.8 °F (36.6 °C)] 97.8 °F (36.6 °C)  Heart Rate:  [63-83] 78  Resp:  [16-18] 18  BP: (104-139)/(48-63) 139/58  Body mass index is 21.09 kg/(m^2).    Objective:  General Appearance:  Comfortable and well-appearing.    Vital signs: (most recent): Blood pressure 139/58, pulse 78, temperature 97.8 °F (36.6 °C), temperature source Oral, resp. rate 18, height 177.8 cm (70\"), weight 66.7 kg (147 lb), SpO2 96 %.  Vital signs are normal.    HEENT: Normal HEENT exam.    Lungs:  Normal effort.  Breath sounds clear to auscultation.    Heart: Normal rate.  Regular rhythm.    Abdomen: Abdomen is soft and non-distended.  Bowel sounds are normal.   There is no abdominal tenderness.     Extremities: There is no deformity or dependent edema.    Neurological: Patient is alert.  (Oriented x2).    Skin:  Warm and dry.  No rash.             Results Review:       I reviewed the patient's new clinical results.    Results from last 7 days  Lab Units 18  0847 18  1238   WBC 10*3/mm3 15.26* 16.30*   HEMOGLOBIN g/dL 8.6* 9.3*   PLATELETS 10*3/mm3 224 257     Results from last 7 days  Lab Units 18  0847 18  1238   SODIUM mmol/L 141 138   POTASSIUM mmol/L 3.8 4.2   CHLORIDE mmol/L 100 95*   CO2 mmol/L 24.1 24.6   BUN mg/dL 61* 67*   CREATININE mg/dL 1.94* 2.40*   GLUCOSE mg/dL 118* 216*   Estimated Creatinine Clearance: 24.4 mL/min (by C-G formula based on Cr of 1.94).  Results from last 7 days  Lab Units 18  0847 18  1238   CALCIUM mg/dL 8.5* 9.0   ALBUMIN g/dL  --  2.90*       RADIOLOGY  2018  Pending      aspirin 81 mg Oral Daily   atorvastatin 40 mg Oral Daily   bisacodyl 10 mg Rectal Daily   heparin (porcine) 5,000 " Units Subcutaneous Q12H   insulin aspart 0-7 Units Subcutaneous 4x Daily With Meals & Nightly   isosorbide mononitrate 30 mg Oral Q24H   metoprolol tartrate 6.25 mg Oral Q12H   piperacillin-tazobactam 3.375 g Intravenous Q12H   polyethylene glycol 17 g Oral Daily   sennosides-docusate sodium 2 tablet Oral BID       Pharmacy to Dose Zosyn    Diet Regular; Cardiac, Consistent Carbohydrate, Low Sodium; 2,000 mg Na      Assessment/Plan   Assessment:   Principal Problem:    Acute UTI  Active Problems:    Hypertension    Type 2 diabetes mellitus with hyperglycemia    Atherosclerosis of coronary artery    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 UTI  - sepsis present on admission  - UCx with >100,000 Ecoli that is pansensitive. Looks to be allergic to cephalosporins. QTc prolonged on last EKG  - will recheck EKG now, if QTc normal will switch to FQ. Otherwise will choose different agent  - will always be at risk for these with indwelling chung.  - WBC pending this am     2. Fecal impaction  - cleared last night  - will cont senna-s in addition to his miralax. Stop daily suppository     3. Pulmonary nodules/masses  - states he does not want any further workup for these     4. Chronic combined HF  - looks euvolemic currently  - torsemide is currently being held  - will plan to restart at discharge. Low salt diet  - Not on ACEi due to renal function. Needs to be on low dose BBlocker     5. CKD4  - Cr about at baseline, pending this am      Disposition  Later today if lab work normal      Ja Escobedo MD  [facility]  02/04/18  8:38 AM

## 2018-02-04 NOTE — PLAN OF CARE
Problem: Patient Care Overview (Adult)  Goal: Plan of Care Review  Outcome: Ongoing (interventions implemented as appropriate)   02/04/18 0403   Coping/Psychosocial Response Interventions   Plan Of Care Reviewed With patient   Patient Care Overview   Progress improving   Outcome Evaluation   Outcome Summary/Follow up Plan Pt a&ox4. Has chronic chung. Admitted for constipation and UTI. Had large BM tonight around 0000. Rectum is raw and bleeding. Pressue ulcer on coccyx, stage 2, meplalex dry and intact. Received IV abx tonight. VS stable, will continue to monitor.     Goal: Adult Individualization and Mutuality  Outcome: Ongoing (interventions implemented as appropriate)    Goal: Discharge Needs Assessment  Outcome: Ongoing (interventions implemented as appropriate)      Problem: Infection, Risk/Actual (Adult)  Goal: Identify Related Risk Factors and Signs and Symptoms  Outcome: Ongoing (interventions implemented as appropriate)    Goal: Infection Prevention/Resolution  Outcome: Ongoing (interventions implemented as appropriate)      Problem: Fall Risk (Adult)  Goal: Identify Related Risk Factors and Signs and Symptoms  Outcome: Ongoing (interventions implemented as appropriate)    Goal: Absence of Falls  Outcome: Ongoing (interventions implemented as appropriate)      Problem: Wound, Traumatic, Nonburn (Adult)  Goal: Signs and Symptoms of Listed Potential Problems Will be Absent or Manageable (Wound, Traumatic, Nonburn)  Outcome: Ongoing (interventions implemented as appropriate)      Problem: Skin Integrity Impairment, Risk/Actual (Adult)  Goal: Identify Related Risk Factors and Signs and Symptoms  Outcome: Ongoing (interventions implemented as appropriate)    Goal: Skin Integrity/Wound Healing  Outcome: Ongoing (interventions implemented as appropriate)      Problem: Pain, Chronic (Adult)  Goal: Identify Related Risk Factors and Signs and Symptoms  Outcome: Ongoing (interventions implemented as  appropriate)    Goal: Acceptable Pain Control/Comfort Level  Outcome: Ongoing (interventions implemented as appropriate)

## 2018-02-04 NOTE — DISCHARGE SUMMARY
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    Chronic kidney disease, stage 3    Chronic combined systolic and diastolic heart failure    Pleural effusion    Constipation    Chronic urinary retention      SECONDARY DIAGNOSES  Past Medical History:   Diagnosis Date   • Acute arthritis    • Acute renal insufficiency    • Anemia    • Angiodysplasia     of colon   • Atherosclerosis of native coronary artery of native heart without angina pectoris    • Atopic dermatitis    • BPH (benign prostatic hyperplasia)    • Cardiomyopathy 3/20/2017   • Carotid artery disease     HAD ABBEY ENDARTERECTOMY   • CKD (chronic kidney disease)    • Coronary artery disease    • Diabetes mellitus, type II     TYPE 2   • Edema    • Elbow pain    • Gout    • Hematuria     HISTORY REASON WHY SURGERY WAS CANCELED 2 WEEKS AGO   • Hyperlipidemia    • Hypertension     OFF MEDICATION FOR NOW.    • Joint effusion of elbow    • Lightheadedness    • Malignant neoplasm of bladder     ALSO CANCER ON LT LEG   • Near syncope    • Nephrolithiasis    • Neuralgia    • Psychogenic dysuria    • Pulmonary HTN 3/20/2017   • Renal insufficiency syndrome    • Skin avulsion    • Urinary retention    • UTI (urinary tract infection)        CONSULTS   Consults     Date and Time Order Name Status Description    2/2/2018 1537 [facility] (on-call MD unless specified) Completed           PROCEDURES PERFORMED  CT Abd/Pelvis:  1. Bilateral pulmonary nodules/masses most concerning for metastatic disease. Partly imaged moderate-sized layering left pleural effusion. Irregular wall thickening of the urinary bladder is consistent with residual or recurrent neoplasm. There is moderate left hydroureteronephrosis without  "significant interim change from imaging  performed in April 2017.  2. Moderate amount of stool throughout the colon with findings to suggest rectal fecal impaction.    HOSPITAL COURSE  Patient is a 89 y.o. male presented to [facility] complaining of constipation.  Please see the admitting history and physical for further details. He was found to have fecal impaction on CT scan. He was started on bowel regimen and given enema, which resolved the impaction. He will be sent out on bowel regimen with instructions to stop taking if he develops diarrhea or multiple BMs. He did have elevated WBC on admission and was found to have a UTI 2/2 his chronic indwelling chung. He was treated with IV Zosyn initially. His UCx grew >100,000 Ecoli that was pansensitive. He is allergic to cephalosporins and QTc was 560 so he was started on Augmentin to complete a 5 day course. He did have incidental finding of pulmonary masses on CT scan of his chest but after discussing with him he did not want any further workup for these including any additional imaging. He was started on BBlocker for his combined HF and is stable for discharge home with HH today.        CONDITION ON DISCHARGE  Stable.      VITAL SIGNS  /58 (BP Location: Right arm, Patient Position: Lying)  Pulse 78  Temp 97.8 °F (36.6 °C) (Oral)   Resp 18  Ht 177.8 cm (70\")  Wt 66.7 kg (147 lb)  SpO2 96%  BMI 21.09 kg/m2  Objective  General Appearance:  Comfortable and well-appearing.    Vital signs: (most recent): Blood pressure 139/58, pulse 78, temperature 97.8 °F (36.6 °C), temperature source Oral, resp. rate 18, height 177.8 cm (70\"), weight 66.7 kg (147 lb), SpO2 96 %.  Vital signs are normal.    HEENT: Normal HEENT exam.    Lungs:  Normal effort.  Breath sounds clear to auscultation.    Heart: Normal rate.  Regular rhythm.    Abdomen: Abdomen is soft and non-distended.  Bowel sounds are normal.   There is no abdominal tenderness.     Extremities: " There is no deformity or dependent edema.    Neurological: Patient is alert.  (Oriented x2).    Skin:  Warm and dry.  No rash.        DISCHARGE DISPOSITION   Home or Self Care      DISCHARGE MEDICATIONS   Imtiaz Jones   Home Medication Instructions JONATHAN:[id number]    Printed on:02/04/18 1150   Medication Information                      amoxicillin-clavulanate (AUGMENTIN) 500-125 MG per tablet  Take 1 tablet by mouth Every 12 (Twelve) Hours for 10 doses. Indications: Urinary Tract Infection             aspirin 81 MG EC tablet  Take 1 tablet by mouth Daily.             isosorbide mononitrate (IMDUR) 30 MG 24 hr tablet  Take 1 tablet by mouth Daily.             metoprolol tartrate (LOPRESSOR) 25 MG tablet  Take 0.5 tablets by mouth Every 12 (Twelve) Hours.             polyethylene glycol (MIRALAX) pack packet  Take 17 g by mouth Daily. Do not take if having multiple loose Bowel movements             sennosides-docusate sodium (SENOKOT-S) 8.6-50 MG tablet  Take 2 tablets by mouth 2 (Two) Times a Day. Do not take if having multiple loose Bowel movements             simvastatin (ZOCOR) 80 MG tablet  Take 80 mg by mouth Every Evening.             torsemide (DEMADEX) 20 MG tablet  Take 1 tablet by mouth Daily.             traMADol (ULTRAM) 50 MG tablet  Take 25 mg by mouth Every 8 (Eight) Hours As Needed for Moderate Pain .                No future appointments.  Additional Instructions for the Follow-ups that You Need to Schedule     Ambulatory Referral to Home Health    As directed    Face to Face Visit Date:  2/4/2018    Follow-up Provider for Plan of Care?:  I treated the patient in an acute care facility and will not continue treatment after discharge.    Follow-up Provider:  JIMENEZ FLOYD [[id number]]    Reason/Clinical Findings:  weakness    Describe mobility limitations that make leaving home difficult:  weakness    Nursing/Therapeutic Services Requested:  Physical Therapy    PT orders:  Therapeutic exercise  Gait Training Transfer training Strengthening    Weight Bearing Status:  Full Weight Bearing    Frequency:  1 Week 1                 Follow-up Information     Follow up with [facility] AND JOSE LYN .    Specialty:  Home Health Services    Contact information:    [address]  [place] [address]          Follow up with Charlse Rose MD .    Specialty:  Family Medicine    Contact information:    [address]  [place] [facility] [address]  [phone]            TEST  RESULTS PENDING AT DISCHARGE         Ja Escobedo MD  [facility]  02/04/18  11:50 AM      Time: greater than 30 minutes.      Dragon disclaimer:  Much of this encounter note is an electronic transcription/translation of spoken language to printed text. The electronic translation of spoken language may permit erroneous, or at times, nonsensical words or phrases to be inadvertently transcribed; Although I have reviewed the note for such errors, some may still exist.

## 2018-02-04 NOTE — PLAN OF CARE
Problem: Patient Care Overview (Adult)  Goal: Plan of Care Review  Outcome: Outcome(s) achieved Date Met: 02/04/18    Goal: Adult Individualization and Mutuality  Outcome: Outcome(s) achieved Date Met: 02/04/18    Goal: Discharge Needs Assessment  Outcome: Outcome(s) achieved Date Met: 02/04/18      Problem: Infection, Risk/Actual (Adult)  Goal: Identify Related Risk Factors and Signs and Symptoms  Outcome: Outcome(s) achieved Date Met: 02/04/18    Goal: Infection Prevention/Resolution  Outcome: Outcome(s) achieved Date Met: 02/04/18      Problem: Fall Risk (Adult)  Goal: Identify Related Risk Factors and Signs and Symptoms  Outcome: Outcome(s) achieved Date Met: 02/04/18    Goal: Absence of Falls  Outcome: Outcome(s) achieved Date Met: 02/04/18      Problem: Wound, Traumatic, Nonburn (Adult)  Goal: Signs and Symptoms of Listed Potential Problems Will be Absent or Manageable (Wound, Traumatic, Nonburn)  Outcome: Ongoing (interventions implemented as appropriate)      Problem: Skin Integrity Impairment, Risk/Actual (Adult)  Goal: Identify Related Risk Factors and Signs and Symptoms  Outcome: Outcome(s) achieved Date Met: 02/04/18    Goal: Skin Integrity/Wound Healing  Outcome: Outcome(s) achieved Date Met: 02/04/18      Problem: Pain, Chronic (Adult)  Goal: Identify Related Risk Factors and Signs and Symptoms  Outcome: Outcome(s) achieved Date Met: 02/04/18    Goal: Acceptable Pain Control/Comfort Level  Outcome: Outcome(s) achieved Date Met: 02/04/18

## 2018-02-04 NOTE — PROGRESS NOTES
Continued Stay Note  [facility]     Patient Name: Imtiaz Jones  MRN: [id number]  Today's Date: 2/4/2018    Admit Date: 2/2/2018          Discharge Plan       02/04/18 1201    Case Management/Social Work Plan    Plan Home with wife and [facility].................Abel ROSS     Patient/Family In Agreement With Plan yes    Additional Comments Patient to DC home today. S/W pt., introduced self and role. Pt. states he is current with [facility] and would like to resume services. Call to [place]/[facility] and updated. Pt. denies further needs/concerns related to DC. Plan home today with wife and [facility].............Abel ROSS               Discharge Codes       02/04/18 1203    Discharge Codes    Discharge Codes 06  Discharged/transferred to home under care of organized home health service organization in anticipation of skilled care        Expected Discharge Date and Time     Expected Discharge Date Expected Discharge Time    Feb 4, 2018             Leyla Corey RN

## 2018-02-05 NOTE — PROGRESS NOTES
Case Management Discharge Note    Final Note: Plan home with [facility] JOSIE Siddiqui RN    Discharge Placement     Facility/Agency Request Status Selected? Address Phone Number Fax Number    [facility] Accepted    Yes [address] [address] [phone]        Other: Other (Private Vehicle)    Discharge Codes: 06  Discharged/transferred to home under care of organized home health service organization in anticipation of skilled care

## 2018-02-08 NOTE — TELEPHONE ENCOUNTER
Pleasaedocument where the patient is having this pain, I suspect is related to his urinary tract recommend tramadol 50 mg 1 every 8 hours as needed #30.  He has taken this in the past

## 2018-02-08 NOTE — TELEPHONE ENCOUNTER
Merlyn from [facility] called and asked if  You would be willing to fill pt's isosorbide. Dr. Olsen had prescribed it while in the hospital and his primary will not fill it. Pls advise

## 2018-02-09 NOTE — TELEPHONE ENCOUNTER
I discussed the plan of care Dr. Carl Knowles.  He does not feel that the medications are contributing to his weight loss or bad taste in his mouth. The hospital MD thought it was related to his antibiotic.     Dr. Knowles wants him to follow up in 6 months.   Patient was informed.

## 2018-02-09 NOTE — TELEPHONE ENCOUNTER
----- Message from CALDERON Eisenberg sent at 1/31/2018  2:52 PM EST -----    Discuss plan of care Dr. Carl Knowles  Discuss weight loss and bad taste in mouth when eating  Follow-up appointment?

## 2018-02-09 NOTE — TELEPHONE ENCOUNTER
[facility] [phone] sent a refill request for isosorbide mononitrate 30 mg 1 daily with a last refill date of 1/6/18.  This was last prescribed by Dr. Umberto Olsen - please advise.

## 2018-02-23 PROBLEM — A41.9 SEPTIC SHOCK (HCC): Status: ACTIVE | Noted: 2018-01-01

## 2018-02-23 PROBLEM — R65.21 SEPTIC SHOCK (HCC): Status: ACTIVE | Noted: 2018-01-01

## 2018-02-25 LAB
BACTERIA SPEC AEROBE CULT: ABNORMAL
GRAM STN SPEC: ABNORMAL

## 2018-02-26 LAB
BACTERIA SPEC AEROBE CULT: ABNORMAL

## 2025-03-08 NOTE — PROGRESS NOTES
"   LOS: 2 days    Patient Care Team:  Charles Rose MD as PCP - General (Family Medicine)  Charles Rose MD as PCP - Claims Attributed  Martita Grimm RN as Care Coordinator (Population Health)    Chief Complaint:    Chief Complaint   Patient presents with   • Dizziness   • Nausea       Subjective follow-up acute kidney injury on chronic kidney disease    Interval History:   Feeling much better today, no chest pain or shortness of air, no orthopnea or PND, no nausea or vomiting, no abdominal pain.  He has Flores catheter anchored in place.  He has chronic changes of his lower extremities with chronic edema.      Review of Systems:   As noted above    Objective     Vital Signs  Temp:  [97.6 °F (36.4 °C)-98 °F (36.7 °C)] 97.6 °F (36.4 °C)  Heart Rate:  [64-79] 74  Resp:  [20] 20  BP: ()/(44-65) 106/52    Flowsheet Rows         First Filed Value    Admission Height  177.8 cm (70\") Documented at 01/02/2018 0247    Admission Weight  74.4 kg (164 lb) Documented at 01/02/2018 0247             I/O last 3 completed shifts:  In: 900 [I.V.:800; IV Piggyback:100]  Out: 1000 [Urine:1000]    Intake/Output Summary (Last 24 hours) at 01/04/18 0806  Last data filed at 01/04/18 0610   Gross per 24 hour   Intake               50 ml   Output              700 ml   Net             -650 ml       Physical Exam:  General Appearance: alert, oriented x 3, no acute distress, appears to be chronically ill  Skin: warm and dry, Chronic stasis dermatitis both lower extremities  HEENT: Nonicteric sclerae, oral mucosa normal,   Neck: supple, no JVD, trachea midline  Lungs: Bilateral rhonchi, unlabored breathing effort  Heart: RRR, normal S1 and S2, no S3, no rub  Abdomen: soft, non-tender,  present bowel sounds to auscultation  : Flores catheter anchored in place,no palpable bladder,  Extremities: Chronic lower extremity edema with 1+ pedal edema, also evidenced stasis dermatitis, no cyanosis or clubbing.  Neuro: normal speech and mental " status      Results Review:      Results from last 7 days  Lab Units 01/04/18  0334 01/03/18  0440 01/02/18  0354   SODIUM mmol/L 138 144 141   POTASSIUM mmol/L 3.8 4.4 4.7   CHLORIDE mmol/L 102 106 103   CO2 mmol/L 20.2* 19.9* 19.7*   BUN mg/dL 60* 55* 45*   CREATININE mg/dL 2.57* 2.59* 2.30*   CALCIUM mg/dL 8.0* 8.1* 9.1   BILIRUBIN mg/dL 0.4 0.4 0.5   ALK PHOS U/L 89 97 80   ALT (SGPT) U/L 262* 278* 61*   AST (SGOT) U/L 212* 347* 102*   GLUCOSE mg/dL 108* 91 124*       Estimated Creatinine Clearance: 17.8 mL/min (by C-G formula based on Cr of 2.57).      Results from last 7 days  Lab Units 01/04/18  0334 01/02/18  0354   MAGNESIUM mg/dL 2.3 2.2   PHOSPHORUS mg/dL 3.8  --          Results from last 7 days  Lab Units 01/04/18  0334   URIC ACID mg/dL 11.7*         Results from last 7 days  Lab Units 01/04/18  0334 01/03/18  0440 01/02/18  0354   WBC 10*3/mm3 8.88 7.93 14.49*   HEMOGLOBIN g/dL 8.8* 8.4* 10.0*   PLATELETS 10*3/mm3 203 171 181               Imaging Results (last 24 hours)     ** No results found for the last 24 hours. **          aspirin 81 mg Oral Daily   enoxaparin 1 mg/kg Subcutaneous Q24H   isosorbide mononitrate 30 mg Oral Q24H   piperacillin-tazobactam 3.375 g Intravenous Q12H          Medication Review:   Current Facility-Administered Medications   Medication Dose Route Frequency Provider Last Rate Last Dose   • aspirin EC tablet 81 mg  81 mg Oral Daily Carl Knowles III, MD   81 mg at 01/03/18 1000   • enoxaparin (LOVENOX) syringe 70 mg  1 mg/kg Subcutaneous Q24H Carl Knowles III, MD   70 mg at 01/03/18 1029   • isosorbide mononitrate (IMDUR) 24 hr tablet 30 mg  30 mg Oral Q24H Shyam Powell MD       • piperacillin-tazobactam (ZOSYN) 3.375 g in iso-osmotic dextrose 50 ml (premix)  3.375 g Intravenous Q12H Asia Gibson MD   3.375 g at 01/04/18 0125   • pneumococcal polysaccharide 23-valent (PNEUMOVAX-23) vaccine 0.5 mL  0.5 mL Intramuscular During Hospitalization Leandro Coy MD       •  done sodium chloride 0.9 % flush 10 mL  10 mL Intravenous PRN Pio Mauro MD       • zolpidem (AMBIEN) tablet 5 mg  5 mg Oral Nightly PRN Leandro Coy MD   5 mg at 01/03/18 1067       Assessment/Plan   1.  Acute kidney injury associated with the hypotension leading to decreased renal perfusion and probable ATN associated with the sepsis and non-ST MI, the creatinine is 2.57, stable since yesterday, and his baseline is 1.5.  2.  Stage III chronic kidney disease, Baseline creatinine about 1.5.  3.  Acute to non-ST MI, ejection fraction about 30% with cardiomyopathy  4.  Sepsis treated.  5.  Hypotension associated with sepsis and then non-ST MI improved.  6.  Anemia, hemoglobin 8.8.  Baseline hemoglobin has been between 9 and 10 prior to admission, his iron saturation yesterday was 10% this is consistent with iron deficiency.  7.  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      Plan:  1.  We'll continue the same treatment.  We'll monitor the uric acid and decide if treatment is needed.  2.  Replete iron stores  3.  Surveillance labs          Molina Paris MD  01/04/18  8:06 AM     36.9

## (undated) DEVICE — BITEBLOCK OMNI BLOC

## (undated) DEVICE — CANN NASL CO2 TRULINK W/O2 A/

## (undated) DEVICE — CATH FOL BARDEX HEMATURIA 3WY 24F 30CC

## (undated) DEVICE — SNAR POLYP SENSATION STDOVL 27 240 BX40

## (undated) DEVICE — Device: Brand: DEFENDO AIR/WATER/SUCTION AND BIOPSY VALVE

## (undated) DEVICE — LP CUT RT/ANGL 26FR

## (undated) DEVICE — LOU TUR: Brand: MEDLINE INDUSTRIES, INC.

## (undated) DEVICE — CVR JUMPSUIT STA DRI IMPERV 2MIL REG

## (undated) DEVICE — TUBING, SUCTION, 1/4" X 10', STRAIGHT: Brand: MEDLINE

## (undated) DEVICE — GLV SURG BIOGEL LTX PF 7 1/2

## (undated) DEVICE — THE SINGLE USE ETRAP – POLYP TRAP IS USED FOR SUCTION RETRIEVAL OF ENDOSCOPICALLY REMOVED POLYPS.: Brand: ETRAP

## (undated) DEVICE — SINGLE-USE BIOPSY FORCEPS: Brand: RADIAL JAW 4

## (undated) DEVICE — BG DRN URINE BACTERISTATC 4L

## (undated) DEVICE — THE TORRENT IRRIGATION SCOPE CONNECTOR IS USED WITH THE TORRENT IRRIGATION TUBING TO PROVIDE IRRIGATION FLUIDS SUCH AS STERILE WATER DURING GASTROINTESTINAL ENDOSCOPIC PROCEDURES WHEN USED IN CONJUNCTION WITH AN IRRIGATION PUMP (OR ELECTROSURGICAL UNIT).: Brand: TORRENT